# Patient Record
Sex: FEMALE | Race: WHITE | Employment: OTHER | URBAN - METROPOLITAN AREA
[De-identification: names, ages, dates, MRNs, and addresses within clinical notes are randomized per-mention and may not be internally consistent; named-entity substitution may affect disease eponyms.]

---

## 2017-01-10 ENCOUNTER — TELEPHONE (OUTPATIENT)
Dept: INFECTIOUS DISEASES | Age: 77
End: 2017-01-10

## 2017-01-10 DIAGNOSIS — A31.0 MAI (MYCOBACTERIUM AVIUM-INTRACELLULARE) INFECTION (HCC): Primary | ICD-10-CM

## 2017-01-12 DIAGNOSIS — A31.0 MAI (MYCOBACTERIUM AVIUM-INTRACELLULARE) (HCC): ICD-10-CM

## 2017-01-12 LAB
A/G RATIO: 1.9 (ref 1.1–2.2)
ALBUMIN SERPL-MCNC: 4.2 G/DL (ref 3.4–5)
ALP BLD-CCNC: 53 U/L (ref 40–129)
ALT SERPL-CCNC: 21 U/L (ref 10–40)
ANION GAP SERPL CALCULATED.3IONS-SCNC: 14 MMOL/L (ref 3–16)
AST SERPL-CCNC: 22 U/L (ref 15–37)
BASOPHILS ABSOLUTE: 0.1 K/UL (ref 0–0.2)
BASOPHILS RELATIVE PERCENT: 1.4 %
BILIRUB SERPL-MCNC: 0.3 MG/DL (ref 0–1)
BUN BLDV-MCNC: 17 MG/DL (ref 7–20)
CALCIUM SERPL-MCNC: 9.5 MG/DL (ref 8.3–10.6)
CHLORIDE BLD-SCNC: 102 MMOL/L (ref 99–110)
CO2: 27 MMOL/L (ref 21–32)
CREAT SERPL-MCNC: 0.8 MG/DL (ref 0.6–1.2)
EOSINOPHILS ABSOLUTE: 0.2 K/UL (ref 0–0.6)
EOSINOPHILS RELATIVE PERCENT: 3.4 %
GFR AFRICAN AMERICAN: >60
GFR NON-AFRICAN AMERICAN: >60
GLOBULIN: 2.2 G/DL
GLUCOSE BLD-MCNC: 146 MG/DL (ref 70–99)
HCT VFR BLD CALC: 42.3 % (ref 36–48)
HEMOGLOBIN: 13.7 G/DL (ref 12–16)
LYMPHOCYTES ABSOLUTE: 2 K/UL (ref 1–5.1)
LYMPHOCYTES RELATIVE PERCENT: 29.5 %
MCH RBC QN AUTO: 29.2 PG (ref 26–34)
MCHC RBC AUTO-ENTMCNC: 32.4 G/DL (ref 31–36)
MCV RBC AUTO: 90 FL (ref 80–100)
MONOCYTES ABSOLUTE: 0.5 K/UL (ref 0–1.3)
MONOCYTES RELATIVE PERCENT: 6.6 %
NEUTROPHILS ABSOLUTE: 4.1 K/UL (ref 1.7–7.7)
NEUTROPHILS RELATIVE PERCENT: 59.1 %
PDW BLD-RTO: 14.8 % (ref 12.4–15.4)
PLATELET # BLD: 218 K/UL (ref 135–450)
PMV BLD AUTO: 9.7 FL (ref 5–10.5)
POTASSIUM SERPL-SCNC: 4.1 MMOL/L (ref 3.5–5.1)
RBC # BLD: 4.7 M/UL (ref 4–5.2)
SODIUM BLD-SCNC: 143 MMOL/L (ref 136–145)
TOTAL PROTEIN: 6.4 G/DL (ref 6.4–8.2)
WBC # BLD: 6.9 K/UL (ref 4–11)

## 2017-01-25 RX ORDER — TIOTROPIUM BROMIDE 18 UG/1
CAPSULE ORAL; RESPIRATORY (INHALATION)
Qty: 90 CAPSULE | Refills: 1 | Status: SHIPPED | OUTPATIENT
Start: 2017-01-25 | End: 2017-07-12 | Stop reason: SDUPTHER

## 2017-02-09 ENCOUNTER — OFFICE VISIT (OUTPATIENT)
Dept: ENT CLINIC | Age: 77
End: 2017-02-09

## 2017-02-09 VITALS
HEART RATE: 74 BPM | BODY MASS INDEX: 23.57 KG/M2 | WEIGHT: 133 LBS | DIASTOLIC BLOOD PRESSURE: 74 MMHG | SYSTOLIC BLOOD PRESSURE: 130 MMHG | HEIGHT: 63 IN

## 2017-02-09 DIAGNOSIS — J34.81 NON-ULCERATIVE NASAL MUCOSITIS: ICD-10-CM

## 2017-02-09 DIAGNOSIS — R04.0 EPISTAXIS, RECURRENT: Primary | ICD-10-CM

## 2017-02-09 PROCEDURE — 99203 OFFICE O/P NEW LOW 30 MIN: CPT | Performed by: OTOLARYNGOLOGY

## 2017-02-09 RX ORDER — SODIUM CHLORIDE/ALOE VERA
SWAB, NON-MEDICATED NASAL
Qty: 20 EACH | Refills: 1 | Status: SHIPPED | OUTPATIENT
Start: 2017-02-09 | End: 2017-03-27 | Stop reason: SDUPTHER

## 2017-02-10 ENCOUNTER — OFFICE VISIT (OUTPATIENT)
Dept: INTERNAL MEDICINE CLINIC | Age: 77
End: 2017-02-10

## 2017-02-10 VITALS
SYSTOLIC BLOOD PRESSURE: 120 MMHG | HEIGHT: 63 IN | DIASTOLIC BLOOD PRESSURE: 62 MMHG | OXYGEN SATURATION: 96 % | BODY MASS INDEX: 24.1 KG/M2 | HEART RATE: 64 BPM | WEIGHT: 136 LBS

## 2017-02-10 DIAGNOSIS — J44.9 CHRONIC OBSTRUCTIVE PULMONARY DISEASE, UNSPECIFIED COPD TYPE (HCC): Primary | ICD-10-CM

## 2017-02-10 DIAGNOSIS — R00.2 HEART PALPITATIONS: ICD-10-CM

## 2017-02-10 DIAGNOSIS — E03.9 HYPOTHYROIDISM, UNSPECIFIED TYPE: ICD-10-CM

## 2017-02-10 DIAGNOSIS — J96.11 CHRONIC RESPIRATORY FAILURE WITH HYPOXIA (HCC): ICD-10-CM

## 2017-02-10 PROCEDURE — 99214 OFFICE O/P EST MOD 30 MIN: CPT | Performed by: INTERNAL MEDICINE

## 2017-02-10 PROCEDURE — 3288F FALL RISK ASSESSMENT DOCD: CPT | Performed by: INTERNAL MEDICINE

## 2017-02-10 PROCEDURE — G8510 SCR DEP NEG, NO PLAN REQD: HCPCS | Performed by: INTERNAL MEDICINE

## 2017-02-10 PROCEDURE — 36415 COLL VENOUS BLD VENIPUNCTURE: CPT | Performed by: INTERNAL MEDICINE

## 2017-02-10 ASSESSMENT — ENCOUNTER SYMPTOMS
CONSTIPATION: 1
SHORTNESS OF BREATH: 1
COUGH: 1
CHEST TIGHTNESS: 0
TROUBLE SWALLOWING: 0
WHEEZING: 0

## 2017-02-10 ASSESSMENT — PATIENT HEALTH QUESTIONNAIRE - PHQ9
1. LITTLE INTEREST OR PLEASURE IN DOING THINGS: 0
2. FEELING DOWN, DEPRESSED OR HOPELESS: 0
SUM OF ALL RESPONSES TO PHQ QUESTIONS 1-9: 0
SUM OF ALL RESPONSES TO PHQ9 QUESTIONS 1 & 2: 0

## 2017-02-11 LAB
T4 FREE: 1.2 NG/DL (ref 0.9–1.8)
TSH SERPL DL<=0.05 MIU/L-ACNC: 3.65 UIU/ML (ref 0.27–4.2)

## 2017-02-20 ENCOUNTER — OFFICE VISIT (OUTPATIENT)
Dept: PULMONOLOGY | Age: 77
End: 2017-02-20

## 2017-02-20 VITALS
SYSTOLIC BLOOD PRESSURE: 118 MMHG | TEMPERATURE: 97.7 F | BODY MASS INDEX: 24.1 KG/M2 | DIASTOLIC BLOOD PRESSURE: 60 MMHG | HEART RATE: 73 BPM | WEIGHT: 136 LBS | OXYGEN SATURATION: 91 % | RESPIRATION RATE: 12 BRPM | HEIGHT: 63 IN

## 2017-02-20 DIAGNOSIS — J96.11 CHRONIC RESPIRATORY FAILURE WITH HYPOXIA (HCC): ICD-10-CM

## 2017-02-20 DIAGNOSIS — J43.2 CENTRILOBULAR EMPHYSEMA (HCC): Primary | ICD-10-CM

## 2017-02-20 DIAGNOSIS — R04.0 EPISTAXIS: ICD-10-CM

## 2017-02-20 DIAGNOSIS — A31.0 MAI (MYCOBACTERIUM AVIUM-INTRACELLULARE) (HCC): ICD-10-CM

## 2017-02-20 PROCEDURE — 99214 OFFICE O/P EST MOD 30 MIN: CPT | Performed by: INTERNAL MEDICINE

## 2017-02-20 RX ORDER — AZITHROMYCIN 250 MG/1
250 TABLET, FILM COATED ORAL DAILY
COMMUNITY
End: 2017-05-26

## 2017-02-20 RX ORDER — ETHAMBUTOL HYDROCHLORIDE 100 MG/1
400 TABLET, FILM COATED ORAL 2 TIMES DAILY
COMMUNITY
End: 2017-08-09 | Stop reason: ALTCHOICE

## 2017-02-22 ENCOUNTER — OFFICE VISIT (OUTPATIENT)
Dept: ENT CLINIC | Age: 77
End: 2017-02-22

## 2017-02-22 VITALS
SYSTOLIC BLOOD PRESSURE: 123 MMHG | HEART RATE: 97 BPM | DIASTOLIC BLOOD PRESSURE: 66 MMHG | BODY MASS INDEX: 24.1 KG/M2 | WEIGHT: 136 LBS | HEIGHT: 63 IN

## 2017-02-22 DIAGNOSIS — R04.0 EPISTAXIS, RECURRENT: Primary | ICD-10-CM

## 2017-02-22 PROCEDURE — 99212 OFFICE O/P EST SF 10 MIN: CPT | Performed by: OTOLARYNGOLOGY

## 2017-03-01 DIAGNOSIS — J18.9 ATYPICAL PNEUMONIA: ICD-10-CM

## 2017-03-01 RX ORDER — AZITHROMYCIN 250 MG/1
TABLET, FILM COATED ORAL
Qty: 90 TABLET | Refills: 1 | Status: SHIPPED | OUTPATIENT
Start: 2017-03-01 | End: 2017-07-12 | Stop reason: SDUPTHER

## 2017-03-27 DIAGNOSIS — R04.0 EPISTAXIS, RECURRENT: ICD-10-CM

## 2017-03-27 DIAGNOSIS — J34.81 NON-ULCERATIVE NASAL MUCOSITIS: ICD-10-CM

## 2017-03-28 RX ORDER — SODIUM CHLORIDE/ALOE VERA
GEL (GRAM) NASAL
Qty: 1 TUBE | Refills: 2 | Status: SHIPPED | OUTPATIENT
Start: 2017-03-28 | End: 2021-08-26

## 2017-04-12 ENCOUNTER — OFFICE VISIT (OUTPATIENT)
Dept: INFECTIOUS DISEASES | Age: 77
End: 2017-04-12

## 2017-04-12 VITALS
BODY MASS INDEX: 27.29 KG/M2 | DIASTOLIC BLOOD PRESSURE: 64 MMHG | OXYGEN SATURATION: 91 % | HEIGHT: 63 IN | HEART RATE: 83 BPM | SYSTOLIC BLOOD PRESSURE: 124 MMHG | TEMPERATURE: 97.9 F | WEIGHT: 154 LBS

## 2017-04-12 DIAGNOSIS — A31.0 MAI (MYCOBACTERIUM AVIUM-INTRACELLULARE) (HCC): ICD-10-CM

## 2017-04-12 DIAGNOSIS — A31.0 MAI (MYCOBACTERIUM AVIUM-INTRACELLULARE) (HCC): Primary | ICD-10-CM

## 2017-04-12 LAB
A/G RATIO: 2 (ref 1.1–2.2)
ALBUMIN SERPL-MCNC: 4.4 G/DL (ref 3.4–5)
ALP BLD-CCNC: 55 U/L (ref 40–129)
ALT SERPL-CCNC: 16 U/L (ref 10–40)
ANION GAP SERPL CALCULATED.3IONS-SCNC: 16 MMOL/L (ref 3–16)
AST SERPL-CCNC: 18 U/L (ref 15–37)
BASOPHILS ABSOLUTE: 0.1 K/UL (ref 0–0.2)
BASOPHILS RELATIVE PERCENT: 1.2 %
BILIRUB SERPL-MCNC: 0.3 MG/DL (ref 0–1)
BUN BLDV-MCNC: 17 MG/DL (ref 7–20)
CALCIUM SERPL-MCNC: 10.3 MG/DL (ref 8.3–10.6)
CHLORIDE BLD-SCNC: 102 MMOL/L (ref 99–110)
CO2: 26 MMOL/L (ref 21–32)
CREAT SERPL-MCNC: 0.5 MG/DL (ref 0.6–1.2)
EOSINOPHILS ABSOLUTE: 0.3 K/UL (ref 0–0.6)
EOSINOPHILS RELATIVE PERCENT: 3.2 %
GFR AFRICAN AMERICAN: >60
GFR NON-AFRICAN AMERICAN: >60
GLOBULIN: 2.2 G/DL
GLUCOSE BLD-MCNC: 90 MG/DL (ref 70–99)
HCT VFR BLD CALC: 43.3 % (ref 36–48)
HEMOGLOBIN: 14.1 G/DL (ref 12–16)
LYMPHOCYTES ABSOLUTE: 2.4 K/UL (ref 1–5.1)
LYMPHOCYTES RELATIVE PERCENT: 26.5 %
MCH RBC QN AUTO: 30 PG (ref 26–34)
MCHC RBC AUTO-ENTMCNC: 32.5 G/DL (ref 31–36)
MCV RBC AUTO: 92.3 FL (ref 80–100)
MONOCYTES ABSOLUTE: 1 K/UL (ref 0–1.3)
MONOCYTES RELATIVE PERCENT: 11 %
NEUTROPHILS ABSOLUTE: 5.2 K/UL (ref 1.7–7.7)
NEUTROPHILS RELATIVE PERCENT: 58.1 %
PDW BLD-RTO: 15.5 % (ref 12.4–15.4)
PLATELET # BLD: 272 K/UL (ref 135–450)
PMV BLD AUTO: 9.6 FL (ref 5–10.5)
POTASSIUM SERPL-SCNC: 4.6 MMOL/L (ref 3.5–5.1)
RBC # BLD: 4.69 M/UL (ref 4–5.2)
SODIUM BLD-SCNC: 144 MMOL/L (ref 136–145)
TOTAL PROTEIN: 6.6 G/DL (ref 6.4–8.2)
WBC # BLD: 9 K/UL (ref 4–11)

## 2017-04-12 PROCEDURE — 99214 OFFICE O/P EST MOD 30 MIN: CPT | Performed by: INTERNAL MEDICINE

## 2017-04-18 ENCOUNTER — TELEPHONE (OUTPATIENT)
Dept: INFECTIOUS DISEASES | Age: 77
End: 2017-04-18

## 2017-04-20 ENCOUNTER — HOSPITAL ENCOUNTER (OUTPATIENT)
Dept: CT IMAGING | Age: 77
Discharge: HOME | End: 2017-04-20
Attending: INTERNAL MEDICINE | Admitting: INTERNAL MEDICINE

## 2017-04-20 DIAGNOSIS — A31.0 PULMONARY MYCOBACTERIAL INFECTION (HCC): ICD-10-CM

## 2017-04-20 DIAGNOSIS — A31.0 MAI (MYCOBACTERIUM AVIUM-INTRACELLULARE) (HCC): ICD-10-CM

## 2017-04-24 ENCOUNTER — TELEPHONE (OUTPATIENT)
Dept: PULMONOLOGY | Age: 77
End: 2017-04-24

## 2017-05-24 RX ORDER — LEVOTHYROXINE SODIUM 0.1 MG/1
TABLET ORAL
Qty: 90 TABLET | Refills: 1 | Status: SHIPPED | OUTPATIENT
Start: 2017-05-24 | End: 2017-11-29 | Stop reason: SDUPTHER

## 2017-05-26 ENCOUNTER — OFFICE VISIT (OUTPATIENT)
Dept: INTERNAL MEDICINE CLINIC | Age: 77
End: 2017-05-26

## 2017-05-26 VITALS
OXYGEN SATURATION: 94 % | DIASTOLIC BLOOD PRESSURE: 68 MMHG | RESPIRATION RATE: 14 BRPM | BODY MASS INDEX: 23.91 KG/M2 | WEIGHT: 135 LBS | HEART RATE: 86 BPM | SYSTOLIC BLOOD PRESSURE: 130 MMHG

## 2017-05-26 DIAGNOSIS — K21.9 GASTROESOPHAGEAL REFLUX DISEASE WITHOUT ESOPHAGITIS: ICD-10-CM

## 2017-05-26 DIAGNOSIS — E03.9 HYPOTHYROIDISM, UNSPECIFIED TYPE: ICD-10-CM

## 2017-05-26 DIAGNOSIS — J96.11 CHRONIC RESPIRATORY FAILURE WITH HYPOXIA (HCC): ICD-10-CM

## 2017-05-26 DIAGNOSIS — J44.9 CHRONIC OBSTRUCTIVE PULMONARY DISEASE, UNSPECIFIED COPD TYPE (HCC): Primary | ICD-10-CM

## 2017-05-26 PROCEDURE — 99213 OFFICE O/P EST LOW 20 MIN: CPT | Performed by: INTERNAL MEDICINE

## 2017-05-26 ASSESSMENT — ENCOUNTER SYMPTOMS
GASTROINTESTINAL NEGATIVE: 1
CHEST TIGHTNESS: 0
WHEEZING: 0
SHORTNESS OF BREATH: 0
TROUBLE SWALLOWING: 0
SORE THROAT: 0

## 2017-06-27 ENCOUNTER — CARE COORDINATION (OUTPATIENT)
Dept: CARE COORDINATION | Age: 77
End: 2017-06-27

## 2017-06-28 RX ORDER — MONTELUKAST SODIUM 10 MG/1
TABLET ORAL
Qty: 90 TABLET | Refills: 3 | Status: SHIPPED | OUTPATIENT
Start: 2017-06-28 | End: 2018-01-15 | Stop reason: SDUPTHER

## 2017-07-05 RX ORDER — ATORVASTATIN CALCIUM 20 MG/1
TABLET, FILM COATED ORAL
Qty: 90 TABLET | Refills: 1 | Status: SHIPPED | OUTPATIENT
Start: 2017-07-05 | End: 2018-01-15 | Stop reason: SDUPTHER

## 2017-07-12 ENCOUNTER — OFFICE VISIT (OUTPATIENT)
Dept: INFECTIOUS DISEASES | Age: 77
End: 2017-07-12

## 2017-07-12 VITALS
HEART RATE: 90 BPM | SYSTOLIC BLOOD PRESSURE: 110 MMHG | WEIGHT: 135 LBS | TEMPERATURE: 98.1 F | HEIGHT: 63 IN | BODY MASS INDEX: 23.92 KG/M2 | OXYGEN SATURATION: 93 % | DIASTOLIC BLOOD PRESSURE: 64 MMHG

## 2017-07-12 DIAGNOSIS — A31.0 MAI (MYCOBACTERIUM AVIUM-INTRACELLULARE) (HCC): Primary | ICD-10-CM

## 2017-07-12 DIAGNOSIS — A31.0 MAI (MYCOBACTERIUM AVIUM-INTRACELLULARE) (HCC): ICD-10-CM

## 2017-07-12 DIAGNOSIS — J18.9 ATYPICAL PNEUMONIA: ICD-10-CM

## 2017-07-12 LAB
A/G RATIO: 2.1 (ref 1.1–2.2)
ALBUMIN SERPL-MCNC: 4.4 G/DL (ref 3.4–5)
ALP BLD-CCNC: 56 U/L (ref 40–129)
ALT SERPL-CCNC: 18 U/L (ref 10–40)
ANION GAP SERPL CALCULATED.3IONS-SCNC: 15 MMOL/L (ref 3–16)
AST SERPL-CCNC: 21 U/L (ref 15–37)
BASOPHILS ABSOLUTE: 0.1 K/UL (ref 0–0.2)
BASOPHILS RELATIVE PERCENT: 0.8 %
BILIRUB SERPL-MCNC: 0.4 MG/DL (ref 0–1)
BUN BLDV-MCNC: 15 MG/DL (ref 7–20)
CALCIUM SERPL-MCNC: 10.3 MG/DL (ref 8.3–10.6)
CHLORIDE BLD-SCNC: 101 MMOL/L (ref 99–110)
CO2: 27 MMOL/L (ref 21–32)
CREAT SERPL-MCNC: 0.7 MG/DL (ref 0.6–1.2)
EOSINOPHILS ABSOLUTE: 0.3 K/UL (ref 0–0.6)
EOSINOPHILS RELATIVE PERCENT: 4.2 %
GFR AFRICAN AMERICAN: >60
GFR NON-AFRICAN AMERICAN: >60
GLOBULIN: 2.1 G/DL
GLUCOSE BLD-MCNC: 111 MG/DL (ref 70–99)
HCT VFR BLD CALC: 42.3 % (ref 36–48)
HEMOGLOBIN: 14.2 G/DL (ref 12–16)
LYMPHOCYTES ABSOLUTE: 1.7 K/UL (ref 1–5.1)
LYMPHOCYTES RELATIVE PERCENT: 21.8 %
MCH RBC QN AUTO: 30.4 PG (ref 26–34)
MCHC RBC AUTO-ENTMCNC: 33.5 G/DL (ref 31–36)
MCV RBC AUTO: 90.9 FL (ref 80–100)
MONOCYTES ABSOLUTE: 0.5 K/UL (ref 0–1.3)
MONOCYTES RELATIVE PERCENT: 7.2 %
NEUTROPHILS ABSOLUTE: 5 K/UL (ref 1.7–7.7)
NEUTROPHILS RELATIVE PERCENT: 66 %
PDW BLD-RTO: 15.2 % (ref 12.4–15.4)
PLATELET # BLD: 221 K/UL (ref 135–450)
PMV BLD AUTO: 9.7 FL (ref 5–10.5)
POTASSIUM SERPL-SCNC: 4.3 MMOL/L (ref 3.5–5.1)
RBC # BLD: 4.66 M/UL (ref 4–5.2)
SODIUM BLD-SCNC: 143 MMOL/L (ref 136–145)
TOTAL PROTEIN: 6.5 G/DL (ref 6.4–8.2)
WBC # BLD: 7.6 K/UL (ref 4–11)

## 2017-07-12 PROCEDURE — 99213 OFFICE O/P EST LOW 20 MIN: CPT | Performed by: INTERNAL MEDICINE

## 2017-07-12 RX ORDER — TIOTROPIUM BROMIDE 18 UG/1
CAPSULE ORAL; RESPIRATORY (INHALATION)
Qty: 90 CAPSULE | Refills: 1 | Status: SHIPPED | OUTPATIENT
Start: 2017-07-12 | End: 2018-01-09 | Stop reason: SDUPTHER

## 2017-07-12 RX ORDER — ETHAMBUTOL HYDROCHLORIDE 400 MG/1
800 TABLET, FILM COATED ORAL DAILY
Qty: 180 TABLET | Refills: 1 | Status: SHIPPED | OUTPATIENT
Start: 2017-07-12 | End: 2017-10-10

## 2017-07-12 RX ORDER — AZITHROMYCIN 250 MG/1
TABLET, FILM COATED ORAL
Qty: 90 TABLET | Refills: 1 | Status: SHIPPED | OUTPATIENT
Start: 2017-07-12 | End: 2017-12-08 | Stop reason: SDUPTHER

## 2017-07-12 RX ORDER — ETHAMBUTOL HYDROCHLORIDE 400 MG/1
800 TABLET, FILM COATED ORAL DAILY
Qty: 180 TABLET | Refills: 1 | Status: SHIPPED | OUTPATIENT
Start: 2017-07-12 | End: 2017-07-12 | Stop reason: SDUPTHER

## 2017-08-09 ENCOUNTER — OFFICE VISIT (OUTPATIENT)
Dept: PULMONOLOGY | Age: 77
End: 2017-08-09

## 2017-08-09 ENCOUNTER — TELEPHONE (OUTPATIENT)
Dept: INTERNAL MEDICINE CLINIC | Age: 77
End: 2017-08-09

## 2017-08-09 VITALS
HEART RATE: 89 BPM | BODY MASS INDEX: 24.17 KG/M2 | RESPIRATION RATE: 16 BRPM | DIASTOLIC BLOOD PRESSURE: 60 MMHG | TEMPERATURE: 97.6 F | OXYGEN SATURATION: 96 % | HEIGHT: 63 IN | WEIGHT: 136.4 LBS | SYSTOLIC BLOOD PRESSURE: 100 MMHG

## 2017-08-09 DIAGNOSIS — R91.8 LUNG NODULES: ICD-10-CM

## 2017-08-09 DIAGNOSIS — J43.2 CENTRILOBULAR EMPHYSEMA (HCC): Primary | ICD-10-CM

## 2017-08-09 DIAGNOSIS — J96.11 CHRONIC RESPIRATORY FAILURE WITH HYPOXIA (HCC): ICD-10-CM

## 2017-08-09 PROCEDURE — 99214 OFFICE O/P EST MOD 30 MIN: CPT | Performed by: INTERNAL MEDICINE

## 2017-08-09 RX ORDER — ALBUTEROL SULFATE 2.5 MG/3ML
2.5 SOLUTION RESPIRATORY (INHALATION) 2 TIMES DAILY
Qty: 180 ML | Refills: 11 | Status: SHIPPED | OUTPATIENT
Start: 2017-08-09

## 2017-09-01 ENCOUNTER — OFFICE VISIT (OUTPATIENT)
Dept: INTERNAL MEDICINE CLINIC | Age: 77
End: 2017-09-01

## 2017-09-01 VITALS
HEART RATE: 83 BPM | WEIGHT: 136 LBS | BODY MASS INDEX: 24.1 KG/M2 | HEIGHT: 63 IN | RESPIRATION RATE: 16 BRPM | SYSTOLIC BLOOD PRESSURE: 130 MMHG | OXYGEN SATURATION: 91 % | DIASTOLIC BLOOD PRESSURE: 70 MMHG

## 2017-09-01 DIAGNOSIS — J44.9 CHRONIC OBSTRUCTIVE PULMONARY DISEASE, UNSPECIFIED COPD TYPE (HCC): Primary | ICD-10-CM

## 2017-09-01 DIAGNOSIS — J96.11 CHRONIC RESPIRATORY FAILURE WITH HYPOXIA (HCC): ICD-10-CM

## 2017-09-01 PROCEDURE — 99213 OFFICE O/P EST LOW 20 MIN: CPT | Performed by: INTERNAL MEDICINE

## 2017-09-01 ASSESSMENT — ENCOUNTER SYMPTOMS
GASTROINTESTINAL NEGATIVE: 1
WHEEZING: 0
SHORTNESS OF BREATH: 1
CHEST TIGHTNESS: 0
SORE THROAT: 0
COUGH: 0
TROUBLE SWALLOWING: 0

## 2017-09-07 RX ORDER — OMEPRAZOLE 40 MG/1
CAPSULE, DELAYED RELEASE ORAL
Qty: 90 CAPSULE | Refills: 1 | Status: SHIPPED | OUTPATIENT
Start: 2017-09-07 | End: 2018-01-15 | Stop reason: SDUPTHER

## 2017-09-14 DIAGNOSIS — A31.0 MAI (MYCOBACTERIUM AVIUM-INTRACELLULARE) (HCC): ICD-10-CM

## 2017-09-14 LAB
A/G RATIO: 1.7 (ref 1.1–2.2)
ALBUMIN SERPL-MCNC: 4.1 G/DL (ref 3.4–5)
ALP BLD-CCNC: 52 U/L (ref 40–129)
ALT SERPL-CCNC: 19 U/L (ref 10–40)
ANION GAP SERPL CALCULATED.3IONS-SCNC: 13 MMOL/L (ref 3–16)
AST SERPL-CCNC: 19 U/L (ref 15–37)
BASOPHILS ABSOLUTE: 0.1 K/UL (ref 0–0.2)
BASOPHILS RELATIVE PERCENT: 1 %
BILIRUB SERPL-MCNC: 0.3 MG/DL (ref 0–1)
BUN BLDV-MCNC: 15 MG/DL (ref 7–20)
CALCIUM SERPL-MCNC: 9.9 MG/DL (ref 8.3–10.6)
CHLORIDE BLD-SCNC: 104 MMOL/L (ref 99–110)
CO2: 26 MMOL/L (ref 21–32)
CREAT SERPL-MCNC: 0.7 MG/DL (ref 0.6–1.2)
EOSINOPHILS ABSOLUTE: 0.2 K/UL (ref 0–0.6)
EOSINOPHILS RELATIVE PERCENT: 2.1 %
GFR AFRICAN AMERICAN: >60
GFR NON-AFRICAN AMERICAN: >60
GLOBULIN: 2.4 G/DL
GLUCOSE BLD-MCNC: 105 MG/DL (ref 70–99)
HCT VFR BLD CALC: 44.1 % (ref 36–48)
HEMOGLOBIN: 14.6 G/DL (ref 12–16)
LYMPHOCYTES ABSOLUTE: 1.9 K/UL (ref 1–5.1)
LYMPHOCYTES RELATIVE PERCENT: 22 %
MCH RBC QN AUTO: 30.4 PG (ref 26–34)
MCHC RBC AUTO-ENTMCNC: 33.2 G/DL (ref 31–36)
MCV RBC AUTO: 91.6 FL (ref 80–100)
MONOCYTES ABSOLUTE: 0.7 K/UL (ref 0–1.3)
MONOCYTES RELATIVE PERCENT: 7.8 %
NEUTROPHILS ABSOLUTE: 5.8 K/UL (ref 1.7–7.7)
NEUTROPHILS RELATIVE PERCENT: 67.1 %
PDW BLD-RTO: 15.6 % (ref 12.4–15.4)
PLATELET # BLD: 222 K/UL (ref 135–450)
PMV BLD AUTO: 9.8 FL (ref 5–10.5)
POTASSIUM SERPL-SCNC: 4.3 MMOL/L (ref 3.5–5.1)
RBC # BLD: 4.81 M/UL (ref 4–5.2)
SODIUM BLD-SCNC: 143 MMOL/L (ref 136–145)
TOTAL PROTEIN: 6.5 G/DL (ref 6.4–8.2)
WBC # BLD: 8.6 K/UL (ref 4–11)

## 2017-09-27 DIAGNOSIS — J18.9 ATYPICAL PNEUMONIA: ICD-10-CM

## 2017-09-27 RX ORDER — AZITHROMYCIN 250 MG/1
TABLET, FILM COATED ORAL
Qty: 90 TABLET | Refills: 1 | Status: SHIPPED | OUTPATIENT
Start: 2017-09-27 | End: 2018-02-26 | Stop reason: ALTCHOICE

## 2017-10-04 ENCOUNTER — TELEPHONE (OUTPATIENT)
Dept: INFECTIOUS DISEASES | Age: 77
End: 2017-10-04

## 2017-10-04 NOTE — TELEPHONE ENCOUNTER
Pt called needs PA for azithromycin, please call [phone] to facilitate. Pt also needs refills on Rifampin and Myambutol.

## 2017-11-29 RX ORDER — LEVOTHYROXINE SODIUM 0.1 MG/1
TABLET ORAL
Qty: 90 TABLET | Refills: 1 | Status: SHIPPED | OUTPATIENT
Start: 2017-11-29 | End: 2018-01-15 | Stop reason: SDUPTHER

## 2017-12-08 ENCOUNTER — OFFICE VISIT (OUTPATIENT)
Dept: INTERNAL MEDICINE CLINIC | Age: 77
End: 2017-12-08

## 2017-12-08 VITALS
HEART RATE: 78 BPM | OXYGEN SATURATION: 93 % | HEIGHT: 63 IN | WEIGHT: 136 LBS | BODY MASS INDEX: 24.1 KG/M2 | DIASTOLIC BLOOD PRESSURE: 66 MMHG | RESPIRATION RATE: 16 BRPM | SYSTOLIC BLOOD PRESSURE: 132 MMHG

## 2017-12-08 DIAGNOSIS — E03.9 HYPOTHYROIDISM, UNSPECIFIED TYPE: ICD-10-CM

## 2017-12-08 DIAGNOSIS — K21.9 GASTROESOPHAGEAL REFLUX DISEASE WITHOUT ESOPHAGITIS: ICD-10-CM

## 2017-12-08 DIAGNOSIS — J96.11 CHRONIC RESPIRATORY FAILURE WITH HYPOXIA (HCC): ICD-10-CM

## 2017-12-08 DIAGNOSIS — J44.9 CHRONIC OBSTRUCTIVE PULMONARY DISEASE, UNSPECIFIED COPD TYPE (HCC): Primary | ICD-10-CM

## 2017-12-08 DIAGNOSIS — E78.00 PURE HYPERCHOLESTEROLEMIA: ICD-10-CM

## 2017-12-08 LAB
ALBUMIN SERPL-MCNC: 4.6 G/DL (ref 3.4–5)
ALP BLD-CCNC: 51 U/L (ref 40–129)
ALT SERPL-CCNC: 15 U/L (ref 10–40)
AST SERPL-CCNC: 19 U/L (ref 15–37)
BILIRUB SERPL-MCNC: 0.5 MG/DL (ref 0–1)
BILIRUBIN DIRECT: <0.2 MG/DL (ref 0–0.3)
BILIRUBIN, INDIRECT: NORMAL MG/DL (ref 0–1)
CHOLESTEROL, TOTAL: 139 MG/DL (ref 0–199)
HDLC SERPL-MCNC: 71 MG/DL (ref 40–60)
LDL CHOLESTEROL CALCULATED: 33 MG/DL
T4 FREE: 1.3 NG/DL (ref 0.9–1.8)
TOTAL PROTEIN: 6.9 G/DL (ref 6.4–8.2)
TRIGL SERPL-MCNC: 175 MG/DL (ref 0–150)
TSH SERPL DL<=0.05 MIU/L-ACNC: 3.21 UIU/ML (ref 0.27–4.2)
VLDLC SERPL CALC-MCNC: 35 MG/DL

## 2017-12-08 PROCEDURE — 99213 OFFICE O/P EST LOW 20 MIN: CPT | Performed by: INTERNAL MEDICINE

## 2017-12-08 PROCEDURE — 36415 COLL VENOUS BLD VENIPUNCTURE: CPT | Performed by: INTERNAL MEDICINE

## 2017-12-08 ASSESSMENT — ENCOUNTER SYMPTOMS
SHORTNESS OF BREATH: 0
GASTROINTESTINAL NEGATIVE: 1
COUGH: 0
CHEST TIGHTNESS: 0
WHEEZING: 0
TROUBLE SWALLOWING: 0

## 2017-12-13 ENCOUNTER — OFFICE VISIT (OUTPATIENT)
Dept: INFECTIOUS DISEASES | Age: 77
End: 2017-12-13

## 2017-12-13 ENCOUNTER — HOSPITAL ENCOUNTER (OUTPATIENT)
Dept: OTHER | Age: 77
Discharge: HOME | End: 2017-12-13
Attending: INTERNAL MEDICINE | Admitting: INTERNAL MEDICINE

## 2017-12-13 VITALS
BODY MASS INDEX: 24.27 KG/M2 | DIASTOLIC BLOOD PRESSURE: 60 MMHG | SYSTOLIC BLOOD PRESSURE: 132 MMHG | TEMPERATURE: 97.4 F | OXYGEN SATURATION: 88 % | WEIGHT: 137 LBS | HEIGHT: 63 IN | HEART RATE: 84 BPM

## 2017-12-13 DIAGNOSIS — J96.11 CHRONIC RESPIRATORY FAILURE WITH HYPOXIA (HCC): ICD-10-CM

## 2017-12-13 DIAGNOSIS — A31.0 MAI (MYCOBACTERIUM AVIUM-INTRACELLULARE) (HCC): ICD-10-CM

## 2017-12-13 DIAGNOSIS — A31.0 MAI (MYCOBACTERIUM AVIUM-INTRACELLULARE) (HCC): Primary | ICD-10-CM

## 2017-12-13 PROCEDURE — 99213 OFFICE O/P EST LOW 20 MIN: CPT | Performed by: INTERNAL MEDICINE

## 2017-12-15 ENCOUNTER — TELEPHONE (OUTPATIENT)
Dept: INFECTIOUS DISEASES | Age: 77
End: 2017-12-15

## 2017-12-15 NOTE — TELEPHONE ENCOUNTER
Pt called and said she was seen by Dr on 12/13/17 in clinic. She isn't feeling any better and has cough and shortness of breath and wanted to inform Dr Magui Gordon of her symptoms and wasn't feeling better. Please advise.

## 2017-12-16 RX ORDER — PREDNISONE 20 MG/1
40 TABLET ORAL DAILY
Qty: 10 TABLET | Refills: 0 | Status: SHIPPED | OUTPATIENT
Start: 2017-12-16 | End: 2017-12-21

## 2017-12-18 NOTE — TELEPHONE ENCOUNTER
Called and spoke with patient. Pt states she still has cough but the shortness of breath is better and overall she feels better.

## 2017-12-22 NOTE — PROGRESS NOTES
Infectious Diseases Outpatient Follow-up Note      Primary Care Physician:  Ijeoma Carr MD       History Obtained From:   Patient, EPIC    CHIEF COMPLAINT / ID problem:    Chief Complaint   Patient presents with    Follow-up     HEAVENLY (mycobacterium avium-intracellulare) ([facility] [address]       HISTORY OF PRESENT ILLNESS / Interval history:  Follow up on HEAVENLY Lung infection h/o COPD had URI symptoms x 2 days feels tired no fevers cough dry+ tolerating the med swell and use O2 pRN, recent LFTs from PCP visit normal has seen Jeremy Pike in follow up. She is on Azithromycin and  Ethambutol + Rifampin. Past Medical History:    Past Medical History:   Diagnosis Date    COPD (chronic obstructive pulmonary disease) (HCC)     GERD (gastroesophageal reflux disease)     High cholesterol        Past Surgical History:    Past Surgical History:   Procedure Laterality Date     SECTION      4 times    COLONOSCOPY  . redundant colon       Current Medications:    Current Outpatient Prescriptions   Medication Sig Dispense Refill    levothyroxine (SYNTHROID) 100 MCG tablet TAKE 1 TABLET DAILY 90 tablet 1    azithromycin (ZITHROMAX) 250 MG tablet TAKE 1 TABLET DAILY 90 tablet 1    omeprazole (PRILOSEC) 40 MG delayed release capsule TAKE 1 CAPSULE DAILY 90 capsule 1    fluticasone-salmeterol (ADVAIR DISKUS) 500-50 MCG/DOSE diskus inhaler USE 1 INHALATION ORALLY    TWICE DAILY 180 each 1    albuterol (PROVENTIL) (2.5 MG/3ML) 0.083% nebulizer solution Take 3 mLs by nebulization 2 times daily 180 mL 11    SPIRIVA HANDIHALER 18 MCG inhalation capsule INHALE THE CONTENTS OF ONE CAPSULE DAILY 90 capsule 1    atorvastatin (LIPITOR) 20 MG tablet TAKE 1 TABLET DAILY 90 tablet 1    montelukast (SINGULAIR) 10 MG tablet TAKE 1 TABLET NIGHTLY 90 tablet 3    saline nasal gel (AYR) GEL APPLY INSIDE BOTH NOSTRILS TWO TIMES A DAY 1 Tube 2    Misc.  Devices (ACAPELLA) MISC 1 Device by Does not apply route daily 1 each 0    ipratropium-albuterol (DUONEB) 0.5-2.5 (3) MG/3ML SOLN nebulizer solution Take 3 mLs by nebulization every 6 hours as needed for Shortness of Breath 360 mL 6    PROAIR  (90 BASE) MCG/ACT inhaler INHALE TWO PUFFS BY MOUTH EVERY 4 HOURS AS NEEDED FOR WHEEZING 8.5 g 2    sodium chloride 3 % nebulizer solution Take 15 mLs by nebulization as needed for Other or Cough 300 mL 1    predniSONE (DELTASONE) 20 MG tablet Take 2 tablets by mouth daily for 5 days 10 tablet 0    rifampin (RIFADIN) 300 MG capsule Take 1 capsule by mouth 2 times daily 180 capsule 1     No current facility-administered medications for this visit. Allergies:  Sulfa antibiotics    Social History:    Social History     Social History    Marital status:      Spouse name: N/A    Number of children: N/A    Years of education: N/A     Social History Main Topics    Smoking status: Former Smoker     Packs/day: 1.50     Years: 50.00     Types: Cigarettes     Quit date: 9/6/1999    Smokeless tobacco: Never Used      Comment: quit 10 years ago    Alcohol use No    Drug use: No    Sexual activity: Not Asked     Other Topics Concern    None     Social History Narrative    None     Family History   Problem Relation Age of Onset    Diabetes Sister      x's 1    Other Sister      cad    High Blood Pressure Mother     Diabetes Mother     Heart Attack Mother     Other Mother      hypothyroidism    High Blood Pressure Father     Heart Attack Father     Heart Attack Maternal Aunt          REVIEW OF SYSTEMS:    No fever / chills / sweats. No weight loss. No visual change, eye pain, eye discharge. No oral lesion, sore throat, dysphagia. Denies cough / sputum. Denies chest pain, palpitations. Denies n / v / abd pain. No diarrhea. Denies dysuria or change in urinary function. Denies joint swelling or pain. No myalgia, arthralgia.   Denies focal weakness, sensory change or other neurologic symptom  No lymph node swelling

## 2018-01-09 ENCOUNTER — TELEPHONE (OUTPATIENT)
Dept: INTERNAL MEDICINE CLINIC | Age: 78
End: 2018-01-09

## 2018-01-15 RX ORDER — ATORVASTATIN CALCIUM 20 MG/1
TABLET, FILM COATED ORAL
Qty: 90 TABLET | Refills: 1 | Status: SHIPPED | OUTPATIENT
Start: 2018-01-15 | End: 2019-05-01 | Stop reason: SDUPTHER

## 2018-01-15 RX ORDER — OMEPRAZOLE 40 MG/1
CAPSULE, DELAYED RELEASE ORAL
Qty: 90 CAPSULE | Refills: 1 | Status: SHIPPED | OUTPATIENT
Start: 2018-01-15 | End: 2018-09-20 | Stop reason: SDUPTHER

## 2018-01-15 RX ORDER — MONTELUKAST SODIUM 10 MG/1
TABLET ORAL
Qty: 90 TABLET | Refills: 1 | Status: SHIPPED | OUTPATIENT
Start: 2018-01-15 | End: 2018-08-15 | Stop reason: SDUPTHER

## 2018-01-15 RX ORDER — LEVOTHYROXINE SODIUM 0.1 MG/1
TABLET ORAL
Qty: 90 TABLET | Refills: 1 | Status: SHIPPED | OUTPATIENT
Start: 2018-01-15 | End: 2018-07-30 | Stop reason: SDUPTHER

## 2018-02-16 ENCOUNTER — TELEPHONE (OUTPATIENT)
Dept: INTERNAL MEDICINE CLINIC | Age: 78
End: 2018-02-16

## 2018-02-26 ENCOUNTER — OFFICE VISIT (OUTPATIENT)
Dept: PULMONOLOGY | Age: 78
End: 2018-02-26

## 2018-02-26 VITALS
WEIGHT: 132 LBS | BODY MASS INDEX: 23.39 KG/M2 | RESPIRATION RATE: 16 BRPM | SYSTOLIC BLOOD PRESSURE: 110 MMHG | HEART RATE: 85 BPM | TEMPERATURE: 97.5 F | OXYGEN SATURATION: 90 % | HEIGHT: 63 IN | DIASTOLIC BLOOD PRESSURE: 64 MMHG

## 2018-02-26 DIAGNOSIS — R91.8 LUNG NODULES: ICD-10-CM

## 2018-02-26 DIAGNOSIS — J43.2 CENTRILOBULAR EMPHYSEMA (HCC): Primary | ICD-10-CM

## 2018-02-26 DIAGNOSIS — J96.11 CHRONIC RESPIRATORY FAILURE WITH HYPOXIA (HCC): ICD-10-CM

## 2018-02-26 PROCEDURE — 99214 OFFICE O/P EST MOD 30 MIN: CPT | Performed by: INTERNAL MEDICINE

## 2018-02-26 NOTE — PROGRESS NOTES
albuterol (PROVENTIL) (2.5 MG/3ML) 0.083% nebulizer solution Take 3 mLs by nebulization 2 times daily 180 mL 11    saline nasal gel (AYR) GEL APPLY INSIDE BOTH NOSTRILS TWO TIMES A DAY 1 Tube 2    Misc. Devices (ACAPELLA) MISC 1 Device by Does not apply route daily 1 each 0    PROAIR  (90 BASE) MCG/ACT inhaler INHALE TWO PUFFS BY MOUTH EVERY 4 HOURS AS NEEDED FOR WHEEZING 8.5 g 2    sodium chloride 3 % nebulizer solution Take 15 mLs by nebulization as needed for Other or Cough 300 mL 1    rifampin (RIFADIN) 300 MG capsule Take 1 capsule by mouth 2 times daily 180 capsule 1     No current facility-administered medications for this visit. Allergies   Allergen Reactions    Sulfa Antibiotics        Family History   Problem Relation Age of Onset    Diabetes Sister      x's 3    Other Sister      cad    High Blood Pressure Mother     Diabetes Mother     Heart Attack Mother     Other Mother      hypothyroidism    High Blood Pressure Father     Heart Attack Father     Heart Attack Maternal Aunt        Social History     Social History    Marital status:      Spouse name: N/A    Number of children: N/A    Years of education: N/A     Occupational History    Not on file.      Social History Main Topics    Smoking status: Former Smoker     Packs/day: 1.50     Years: 50.00     Types: Cigarettes     Quit date: 9/6/1999    Smokeless tobacco: Never Used      Comment: quit 10 years ago    Alcohol use No    Drug use: No    Sexual activity: Not on file     Other Topics Concern    Not on file     Social History Narrative    No narrative on file       Immunization History   Administered Date(s) Administered    Influenza Nasal 11/01/2014    Influenza Vaccine, unspecified formulation 11/15/2016    Influenza Virus Vaccine 11/01/2014    Influenza Whole 10/01/2008    Influenza, High Dose 10/12/2015, 11/15/2016    Pneumococcal 13-valent Conjugate ([id number]) 04/15/2016    Pneumococcal Conjugate 7-valent 11/01/2005, 10/11/2010    Pneumococcal Polysaccharide ([id number]) 02/26/2017       ROS:  GENERAL:  No fevers, no chills  RESPIRATORY:  + exertional shortness of breath, + cough      PHYSICAL EXAM:  Vitals:    02/26/18 0755   BP: 110/64   Pulse: 85   Resp: 16   Temp: 97.5 °F (36.4 °C)   SpO2: 90%   on 2L NC    Gen: Well developed; well nourished  Eyes: No scleral icterus. No conjunctival injection. ENT:  Oropharynx clear. External appearance of ears and nose normal.  Neck: Trachea midline. No obvious mass. No visible thyroid enlargement    Respiratory: Clear to auscultation bilaterally, no accessory muscle use  Cardiovascular: Regular rate and rhythm, no appreciable murmurs. No edema. Gastrointestinal: Soft, non-tender. No hernia  Skin: Warm and dry. No rashes or ulcers on visible areas (LLL wrapped in bandage). Normal texture and turgor  Lymphatic: No cervical LAD. No supraclavicular LAD. Musculoskeletal: No cyanosis, clubbing or joint deformity. Psychiatric: Normal mood and affect; exhibits normal insight and judgement     Pulmonary Function Testing (6/20/16)  FVC 2.87 L at 108% predicted ---> 2.92L at 110% predicted  FEV1 131 L at 66% predicted ----> 1.36L at 69% predicted  FEV1/FVC ratio at 46% ----> 47% predicted  TLC 4.55 L at 92% predicted  VC 2.87L at 109% predicted  ERV 1.27L at 146% predicted  RV/TLC at 37% predicted  DLCO 4.3 at 22% predicted  DLCO/VA 1.33L at 38 % predicted      Overall: Moderate lower airflow obstruction without significant reversal; normal lung volumes; severe reduction in diffusing capacity that does not normalize when averaged over lung volumes (compared to the study in 2013 diffusing capacity has declined, otherwise there has been no significant change)          Pulmonary Function Testing (9/16/13)  FVC 2.69 L at 97% predicted    FEV1 1.23 L at 59% predicted  FEV1/FVC ratio at 44%  TLC 4.4 L at 94% predicted      Overall:  Moderately severe obstruction,

## 2018-03-03 ENCOUNTER — TELEPHONE (OUTPATIENT)
Dept: INTERNAL MEDICINE CLINIC | Age: 78
End: 2018-03-03

## 2018-03-05 ENCOUNTER — TELEPHONE (OUTPATIENT)
Dept: PULMONOLOGY | Age: 78
End: 2018-03-05

## 2018-03-05 DIAGNOSIS — J44.9 CHRONIC OBSTRUCTIVE PULMONARY DISEASE, UNSPECIFIED COPD TYPE (HCC): ICD-10-CM

## 2018-03-05 DIAGNOSIS — J96.11 CHRONIC RESPIRATORY FAILURE WITH HYPOXIA (HCC): Primary | ICD-10-CM

## 2018-03-05 NOTE — TELEPHONE ENCOUNTER
Pt called requesting Rx for a POC to be sent to [facility]  Their phone is [phone]  Fax- [phone]  Matheus Campos to send orders

## 2018-03-16 ENCOUNTER — TELEPHONE (OUTPATIENT)
Dept: INTERNAL MEDICINE CLINIC | Age: 78
End: 2018-03-16

## 2018-03-16 ENCOUNTER — OFFICE VISIT (OUTPATIENT)
Dept: INTERNAL MEDICINE CLINIC | Age: 78
End: 2018-03-16

## 2018-03-16 VITALS
SYSTOLIC BLOOD PRESSURE: 118 MMHG | BODY MASS INDEX: 23.57 KG/M2 | WEIGHT: 133 LBS | HEART RATE: 84 BPM | DIASTOLIC BLOOD PRESSURE: 74 MMHG | RESPIRATION RATE: 18 BRPM | HEIGHT: 63 IN | OXYGEN SATURATION: 95 %

## 2018-03-16 DIAGNOSIS — J44.9 CHRONIC OBSTRUCTIVE PULMONARY DISEASE, UNSPECIFIED COPD TYPE (HCC): Primary | ICD-10-CM

## 2018-03-16 DIAGNOSIS — J96.11 CHRONIC RESPIRATORY FAILURE WITH HYPOXIA (HCC): ICD-10-CM

## 2018-03-16 DIAGNOSIS — K21.9 GASTROESOPHAGEAL REFLUX DISEASE WITHOUT ESOPHAGITIS: ICD-10-CM

## 2018-03-16 PROCEDURE — 99213 OFFICE O/P EST LOW 20 MIN: CPT | Performed by: INTERNAL MEDICINE

## 2018-03-16 RX ORDER — PREDNISONE 10 MG/1
TABLET ORAL
Qty: 35 TABLET | Refills: 0 | Status: SHIPPED | OUTPATIENT
Start: 2018-03-16 | End: 2018-04-30

## 2018-03-16 ASSESSMENT — PATIENT HEALTH QUESTIONNAIRE - PHQ9
SUM OF ALL RESPONSES TO PHQ QUESTIONS 1-9: 0
2. FEELING DOWN, DEPRESSED OR HOPELESS: 0
1. LITTLE INTEREST OR PLEASURE IN DOING THINGS: 0
SUM OF ALL RESPONSES TO PHQ9 QUESTIONS 1 & 2: 0

## 2018-03-16 ASSESSMENT — ENCOUNTER SYMPTOMS: GASTROINTESTINAL NEGATIVE: 1

## 2018-03-16 NOTE — TELEPHONE ENCOUNTER
Calling to clarify Rx for Prednisone 10 mg, She needs to clarify that the directions are correct. If correct, there is one extra tablet. Please call.

## 2018-03-16 NOTE — PROGRESS NOTES
Subjective:      Patient ID: Kianna Bass is a 65 y.o. female. copd and resp failure    HPI  Has problems with dyspnea for 2 weeks with exertion and some times at nights and has wheezing at times  Has no orthopnea and has no pnd  Has cough with yellow sputum  Feels since she is off meds for Tb her breathing is not as good. Using 2-3 liters oxygen daily. Review of Systems   Constitutional: Negative for activity change, appetite change and unexpected weight change. Cardiovascular: Negative for chest pain, palpitations and leg swelling. Gastrointestinal: Negative. Genitourinary: Negative. Neurological: Negative for dizziness, light-headedness and headaches. Objective:   Physical Exam   Constitutional: She is oriented to person, place, and time. No distress. Eyes: Conjunctivae and EOM are normal. Pupils are equal, round, and reactive to light. No scleral icterus. Neck: No JVD present. No thyromegaly present. Cardiovascular: Normal rate, regular rhythm, normal heart sounds and intact distal pulses. Exam reveals no gallop. No murmur heard. Pulmonary/Chest: No respiratory distress. She has no wheezes. Rales: has few carckles elft abse and has forced exp wheezing and has decaresed bs both lungs. Musculoskeletal: She exhibits no edema. Lymphadenopathy:     She has no cervical adenopathy. Neurological: She is alert and oriented to person, place, and time. Nursing note and vitals reviewed.       Assessment:      Has mild exacerbation of copd  chronic resp failure stable ;'gerd controlled      Plan:      Start on prednisone ad in tapering doses  Continue current medications  See in 3 months

## 2018-04-11 ENCOUNTER — TELEPHONE (OUTPATIENT)
Dept: INTERNAL MEDICINE CLINIC | Age: 78
End: 2018-04-11

## 2018-04-11 RX ORDER — AMOXICILLIN AND CLAVULANATE POTASSIUM 875; 125 MG/1; MG/1
1 TABLET, FILM COATED ORAL 2 TIMES DAILY
Qty: 14 TABLET | Refills: 0 | Status: SHIPPED | OUTPATIENT
Start: 2018-04-11 | End: 2018-04-18

## 2018-04-26 ENCOUNTER — HOSPITAL ENCOUNTER (OUTPATIENT)
Dept: CT IMAGING | Age: 78
Discharge: HOME | End: 2018-04-26
Attending: INTERNAL MEDICINE | Admitting: INTERNAL MEDICINE

## 2018-04-26 DIAGNOSIS — R93.89 ABNORMAL FINDINGS ON DIAGNOSTIC IMAGING OF OTHER SPECIFIED BODY STRUCTURES: ICD-10-CM

## 2018-04-26 DIAGNOSIS — R91.8 LUNG NODULES: ICD-10-CM

## 2018-04-30 ENCOUNTER — OFFICE VISIT (OUTPATIENT)
Dept: PULMONOLOGY | Age: 78
End: 2018-04-30

## 2018-04-30 VITALS
BODY MASS INDEX: 24.17 KG/M2 | TEMPERATURE: 97 F | DIASTOLIC BLOOD PRESSURE: 63 MMHG | SYSTOLIC BLOOD PRESSURE: 131 MMHG | HEIGHT: 63 IN | RESPIRATION RATE: 20 BRPM | WEIGHT: 136.4 LBS | OXYGEN SATURATION: 94 % | HEART RATE: 83 BPM

## 2018-04-30 DIAGNOSIS — R91.8 LUNG NODULES: ICD-10-CM

## 2018-04-30 DIAGNOSIS — J96.11 CHRONIC RESPIRATORY FAILURE WITH HYPOXIA (HCC): ICD-10-CM

## 2018-04-30 DIAGNOSIS — J43.2 CENTRILOBULAR EMPHYSEMA (HCC): Primary | ICD-10-CM

## 2018-04-30 PROCEDURE — 99214 OFFICE O/P EST MOD 30 MIN: CPT | Performed by: INTERNAL MEDICINE

## 2018-04-30 RX ORDER — CEFDINIR 300 MG/1
300 CAPSULE ORAL 2 TIMES DAILY
Qty: 10 CAPSULE | Refills: 0 | Status: SHIPPED | OUTPATIENT
Start: 2018-04-30 | End: 2018-05-05

## 2018-04-30 RX ORDER — PREDNISONE 20 MG/1
TABLET ORAL
Qty: 17 TABLET | Refills: 0 | Status: SHIPPED | OUTPATIENT
Start: 2018-04-30 | End: 2018-07-30

## 2018-04-30 RX ORDER — PREDNISONE 20 MG/1
TABLET ORAL
Qty: 17 TABLET | Refills: 0 | Status: SHIPPED | OUTPATIENT
Start: 2018-04-30 | End: 2018-04-30 | Stop reason: SDUPTHER

## 2018-04-30 RX ORDER — CEFDINIR 300 MG/1
300 CAPSULE ORAL 2 TIMES DAILY
Qty: 10 CAPSULE | Refills: 0 | Status: SHIPPED | OUTPATIENT
Start: 2018-04-30 | End: 2018-04-30 | Stop reason: SDUPTHER

## 2018-06-06 RX ORDER — TIOTROPIUM BROMIDE 18 UG/1
CAPSULE ORAL; RESPIRATORY (INHALATION)
Qty: 90 CAPSULE | Refills: 1 | Status: SHIPPED | OUTPATIENT
Start: 2018-06-06 | End: 2019-01-25 | Stop reason: SDUPTHER

## 2018-06-22 ENCOUNTER — OFFICE VISIT (OUTPATIENT)
Dept: INTERNAL MEDICINE CLINIC | Age: 78
End: 2018-06-22

## 2018-06-22 VITALS
DIASTOLIC BLOOD PRESSURE: 76 MMHG | RESPIRATION RATE: 16 BRPM | WEIGHT: 139 LBS | HEART RATE: 90 BPM | OXYGEN SATURATION: 92 % | SYSTOLIC BLOOD PRESSURE: 130 MMHG | BODY MASS INDEX: 24.63 KG/M2 | HEIGHT: 63 IN

## 2018-06-22 DIAGNOSIS — R73.9 HYPERGLYCEMIA: ICD-10-CM

## 2018-06-22 DIAGNOSIS — J96.11 CHRONIC RESPIRATORY FAILURE WITH HYPOXIA (HCC): ICD-10-CM

## 2018-06-22 DIAGNOSIS — J44.9 CHRONIC OBSTRUCTIVE PULMONARY DISEASE, UNSPECIFIED COPD TYPE (HCC): Primary | ICD-10-CM

## 2018-06-22 DIAGNOSIS — M85.89 OSTEOPENIA OF MULTIPLE SITES: ICD-10-CM

## 2018-06-22 LAB
GLUCOSE BLD-MCNC: 115 MG/DL
HBA1C MFR BLD: 6.8 %

## 2018-06-22 PROCEDURE — 99213 OFFICE O/P EST LOW 20 MIN: CPT | Performed by: INTERNAL MEDICINE

## 2018-06-22 PROCEDURE — 83036 HEMOGLOBIN GLYCOSYLATED A1C: CPT | Performed by: INTERNAL MEDICINE

## 2018-06-22 PROCEDURE — 82962 GLUCOSE BLOOD TEST: CPT | Performed by: INTERNAL MEDICINE

## 2018-06-22 ASSESSMENT — ENCOUNTER SYMPTOMS
GASTROINTESTINAL NEGATIVE: 1
SHORTNESS OF BREATH: 1
COUGH: 0
TROUBLE SWALLOWING: 0
CHEST TIGHTNESS: 0
WHEEZING: 1

## 2018-07-25 ENCOUNTER — HOSPITAL ENCOUNTER (OUTPATIENT)
Dept: CT IMAGING | Age: 78
Discharge: HOME | End: 2018-07-25
Attending: INTERNAL MEDICINE | Admitting: INTERNAL MEDICINE

## 2018-07-25 ENCOUNTER — HOSPITAL ENCOUNTER (OUTPATIENT)
Dept: WOMENS IMAGING | Age: 78
Discharge: HOME | End: 2018-07-25
Attending: INTERNAL MEDICINE | Admitting: INTERNAL MEDICINE

## 2018-07-25 DIAGNOSIS — M85.89 OSTEOPENIA OF MULTIPLE SITES: ICD-10-CM

## 2018-07-25 DIAGNOSIS — M85.89 OTHER SPECIFIED DISORDERS OF BONE DENSITY AND STRUCTURE, MULTIPLE SITES: ICD-10-CM

## 2018-07-25 DIAGNOSIS — R91.8 LUNG NODULES: ICD-10-CM

## 2018-07-25 DIAGNOSIS — R91.8 OTHER NONSPECIFIC ABNORMAL FINDING OF LUNG FIELD (CODE): ICD-10-CM

## 2018-07-25 RX ORDER — B-COMPLEX WITH VITAMIN C
1 TABLET ORAL 2 TIMES DAILY
Qty: 1 TABLET | Refills: 0
Start: 2018-07-25 | End: 2018-07-25 | Stop reason: ALTCHOICE

## 2018-07-26 ENCOUNTER — TELEPHONE (OUTPATIENT)
Dept: INTERNAL MEDICINE CLINIC | Age: 78
End: 2018-07-26

## 2018-07-26 DIAGNOSIS — I31.39 PERICARDIAL EFFUSION: Primary | ICD-10-CM

## 2018-07-30 ENCOUNTER — OFFICE VISIT (OUTPATIENT)
Dept: PULMONOLOGY | Age: 78
End: 2018-07-30

## 2018-07-30 VITALS
TEMPERATURE: 96.9 F | WEIGHT: 138.6 LBS | HEART RATE: 106 BPM | DIASTOLIC BLOOD PRESSURE: 70 MMHG | BODY MASS INDEX: 24.56 KG/M2 | HEIGHT: 63 IN | SYSTOLIC BLOOD PRESSURE: 133 MMHG | OXYGEN SATURATION: 92 % | RESPIRATION RATE: 20 BRPM

## 2018-07-30 DIAGNOSIS — J43.2 CENTRILOBULAR EMPHYSEMA (HCC): ICD-10-CM

## 2018-07-30 DIAGNOSIS — R91.8 LUNG NODULES: ICD-10-CM

## 2018-07-30 DIAGNOSIS — R06.02 SHORTNESS OF BREATH: Primary | ICD-10-CM

## 2018-07-30 DIAGNOSIS — J96.11 CHRONIC RESPIRATORY FAILURE WITH HYPOXIA (HCC): ICD-10-CM

## 2018-07-30 PROCEDURE — 99214 OFFICE O/P EST MOD 30 MIN: CPT | Performed by: INTERNAL MEDICINE

## 2018-07-30 RX ORDER — CEFUROXIME AXETIL 250 MG/1
250 TABLET ORAL 2 TIMES DAILY
Qty: 10 TABLET | Refills: 0 | Status: SHIPPED | OUTPATIENT
Start: 2018-07-30 | End: 2018-08-04

## 2018-07-30 RX ORDER — PREDNISONE 20 MG/1
TABLET ORAL
Qty: 11 TABLET | Refills: 0 | Status: SHIPPED | OUTPATIENT
Start: 2018-07-30 | End: 2019-01-29

## 2018-07-30 RX ORDER — LEVOTHYROXINE SODIUM 0.1 MG/1
TABLET ORAL
Qty: 90 TABLET | Refills: 1 | Status: SHIPPED | OUTPATIENT
Start: 2018-07-30 | End: 2019-02-20 | Stop reason: SDUPTHER

## 2018-07-30 NOTE — PATIENT INSTRUCTIONS
Please take the prednisone and antibiotic.  Please let me know how you are feeling chao few days    Please come for follow-up visit in 3 months

## 2018-07-30 NOTE — PROGRESS NOTES
Chief complaint  This is a 65y.o. year old female  who presents with a chief complaint of   Chief Complaint   Patient presents with    Follow-up     Emphysema       HPI  76-year-old woman with moderate COPD (FEV1 1.36 L, 69% predicted), chronic hypoxic respiratory failure (on 2 L nasal cannula) and Mycobacterium avium complex infection (completed 18 months of therapy) presents for follow-up. She says for the past week she has noticed increased exertional shortness of breath. Shortness of breath occurs with walking a few feet. It improves with rest. She has noted a cough with white/pale yellow mucus. She is compliant with Advair and Spiriva. She used albuterol nebulizer about 3 times in the past week and the 3% saline nebulizer about 2 times in the past week. She has been using 2-3 L nasal cannula. Past Medical History:   Diagnosis Date    COPD (chronic obstructive pulmonary disease) ([address]     GERD (gastroesophageal reflux disease)     High cholesterol     Osteoporosis        Past Surgical History:   Procedure Laterality Date     SECTION      4 times    COLONOSCOPY  .     redundant colon       Current Outpatient Prescriptions   Medication Sig Dispense Refill    aspirin 81 MG tablet Take 81 mg by mouth every 48 hours as needed for Pain      predniSONE (DELTASONE) 20 MG tablet Take 40mg daily for 3 days, then 20mg daily for 3 days, then 10mg daily for 3 days, then stop 11 tablet 0    cefUROXime (CEFTIN) 250 MG tablet Take 1 tablet by mouth 2 times daily for 5 days 10 tablet 0    Calcium Carb-Cholecalciferol 600-500 MG-UNIT CAPS Take 1 tablet by mouth 2 times daily 1 capsule 0    SPIRIVA HANDIHALER 18 MCG inhalation capsule INHALE THE CONTENTS OF ONE CAPSULE DAILY 90 capsule 1    ADVAIR DISKUS 500-50 MCG/DOSE diskus inhaler INHALE 1 PUFF TWICE DAILY 3 Inhaler 1    montelukast (SINGULAIR) 10 MG tablet TAKE 1 TABLET NIGHTLY 90 tablet 1    atorvastatin (LIPITOR) 20 MG tablet TAKE 1 TABLET (9/16/13)  FVC 2.69 L at 97% predicted    FEV1 1.23 L at 59% predicted  FEV1/FVC ratio at 44%  TLC 4.4 L at 94% predicted      Overall: Moderately severe obstruction, normal lung volumes (diffusion is reported as reduced)          Images and reports of chest imaging were reviewed by me. My interpretation is:  CXR (2/12/16): Increased markings in bilateral lower lung zones and left mid lung zone  Chest CT (4/26/18): Prominent mediastinal nodes; centrilobular emphysema; nodular density in the minor fissure (unchanged compared to 4/2017); amorphous opacity in the left upper lobe (increased compared to 4/2107); RLL nodule (unchanged compared to 4/2017); nodular density in the LLL (stable compared to 4/2017)  Chest CT (7/25/18): No LAD; centrilobular emphysema; nodular density in the right minor fissure and right lower lobe (unchanged compared to April 2017); left lower lobe nodule (unchanged compared to April 2017); left apical nodular density has resolved compared to April 2018      Lab Results   Component Value Date    WBC 8.6 09/14/2017    HGB 14.6 09/14/2017    HCT 44.1 09/14/2017    MCV 91.6 09/14/2017     09/14/2017       No results found for: BNP    Lab Results   Component Value Date    CREATININE 0.7 09/14/2017    BUN 15 09/14/2017     09/14/2017    K 4.3 09/14/2017     09/14/2017    CO2 26 09/14/2017         Assessment/Plan:65y.o. year old female presents for follow up. Shortness of breath-  Will prescribe a 9 day taper of prednisone and 5 days of Ceftin. I have asked her to call me in a few days to let me know she feels. COPD - Has moderate lower airflow obstruction on PFTs. Continue Advair 500/50 mcg twice daily and Spiriva daily. Continue albuterol inhaler, albuterol nebulizer and 3% saline nebulizer as needed. She has completed pulmonary rehabilitation. Chronic hypoxic respiratory failure - She will continue 2-3 L nasal cannula continuously.     Lung nodules - Has bilateral

## 2018-08-03 ENCOUNTER — TELEPHONE (OUTPATIENT)
Dept: PULMONOLOGY | Age: 78
End: 2018-08-03

## 2018-08-03 NOTE — TELEPHONE ENCOUNTER
Patient was prescribed an antibiotic and steroid at her 7/301/8 appt. And advised to call with update.

## 2018-08-03 NOTE — TELEPHONE ENCOUNTER
Patient called in to give an update to Dr. Tavo Falcon after her appointment and she said that her breathing is doing somewhat better.

## 2018-08-15 RX ORDER — MONTELUKAST SODIUM 10 MG/1
TABLET ORAL
Qty: 90 TABLET | Refills: 3 | Status: SHIPPED | OUTPATIENT
Start: 2018-08-15 | End: 2019-06-12 | Stop reason: SDUPTHER

## 2018-08-17 ENCOUNTER — HOSPITAL ENCOUNTER (OUTPATIENT)
Dept: NON INVASIVE DIAGNOSTICS | Age: 78
Discharge: HOME | End: 2018-08-17
Attending: INTERNAL MEDICINE | Admitting: INTERNAL MEDICINE

## 2018-08-17 DIAGNOSIS — I31.39 NONINFLAMMATORY PERICARDIAL EFFUSION: ICD-10-CM

## 2018-08-17 DIAGNOSIS — I31.39 PERICARDIAL EFFUSION: ICD-10-CM

## 2018-08-17 LAB
LV EF: 60 %
LVEF MODALITY: NORMAL

## 2018-09-20 ENCOUNTER — TELEPHONE (OUTPATIENT)
Dept: INTERNAL MEDICINE CLINIC | Age: 78
End: 2018-09-20

## 2018-09-20 RX ORDER — OMEPRAZOLE 40 MG/1
CAPSULE, DELAYED RELEASE ORAL
Qty: 90 CAPSULE | Refills: 1 | Status: SHIPPED | OUTPATIENT
Start: 2018-09-20 | End: 2019-02-28 | Stop reason: SDUPTHER

## 2018-10-05 ENCOUNTER — OFFICE VISIT (OUTPATIENT)
Dept: INTERNAL MEDICINE CLINIC | Age: 78
End: 2018-10-05
Payer: COMMERCIAL

## 2018-10-05 VITALS
DIASTOLIC BLOOD PRESSURE: 68 MMHG | HEIGHT: 63 IN | HEART RATE: 82 BPM | SYSTOLIC BLOOD PRESSURE: 130 MMHG | BODY MASS INDEX: 24.73 KG/M2 | OXYGEN SATURATION: 92 % | RESPIRATION RATE: 16 BRPM | WEIGHT: 139.6 LBS

## 2018-10-05 DIAGNOSIS — J96.11 CHRONIC RESPIRATORY FAILURE WITH HYPOXIA (HCC): ICD-10-CM

## 2018-10-05 DIAGNOSIS — J44.9 CHRONIC OBSTRUCTIVE PULMONARY DISEASE, UNSPECIFIED COPD TYPE (HCC): Primary | ICD-10-CM

## 2018-10-05 DIAGNOSIS — E03.9 HYPOTHYROIDISM, UNSPECIFIED TYPE: ICD-10-CM

## 2018-10-05 DIAGNOSIS — K21.9 GASTROESOPHAGEAL REFLUX DISEASE WITHOUT ESOPHAGITIS: ICD-10-CM

## 2018-10-05 DIAGNOSIS — R73.9 HYPERGLYCEMIA: ICD-10-CM

## 2018-10-05 LAB
GLUCOSE BLD-MCNC: 97 MG/DL
HBA1C MFR BLD: 6.4 %

## 2018-10-05 PROCEDURE — 99213 OFFICE O/P EST LOW 20 MIN: CPT | Performed by: INTERNAL MEDICINE

## 2018-10-05 PROCEDURE — 83036 HEMOGLOBIN GLYCOSYLATED A1C: CPT | Performed by: INTERNAL MEDICINE

## 2018-10-05 PROCEDURE — 82962 GLUCOSE BLOOD TEST: CPT | Performed by: INTERNAL MEDICINE

## 2018-10-05 ASSESSMENT — ENCOUNTER SYMPTOMS
WHEEZING: 1
CHEST TIGHTNESS: 0
CONSTIPATION: 1
SHORTNESS OF BREATH: 1
COUGH: 1

## 2018-10-23 ENCOUNTER — TELEPHONE (OUTPATIENT)
Dept: INTERNAL MEDICINE CLINIC | Age: 78
End: 2018-10-23

## 2018-10-23 ENCOUNTER — HOSPITAL ENCOUNTER (OUTPATIENT)
Dept: CARDIAC REHAB | Age: 78
Setting detail: THERAPIES SERIES
Discharge: HOME | End: 2018-10-23
Payer: COMMERCIAL

## 2018-10-23 DIAGNOSIS — J44.9 CHRONIC OBSTRUCTIVE PULMONARY DISEASE, UNSPECIFIED COPD TYPE (HCC): Primary | ICD-10-CM

## 2018-10-23 DIAGNOSIS — J96.11 CHRONIC RESPIRATORY FAILURE WITH HYPOXIA (HCC): ICD-10-CM

## 2018-10-23 PROCEDURE — 94618 PULMONARY STRESS TESTING: CPT | Performed by: INTERNAL MEDICINE

## 2018-10-25 ENCOUNTER — HOSPITAL ENCOUNTER (OUTPATIENT)
Dept: CARDIAC REHAB | Age: 78
Setting detail: THERAPIES SERIES
Discharge: HOME | End: 2018-10-25
Payer: COMMERCIAL

## 2018-10-25 VITALS — WEIGHT: 139 LBS | BODY MASS INDEX: 24.62 KG/M2

## 2018-10-25 PROCEDURE — G0424 PULMONARY REHAB W EXER: HCPCS

## 2018-10-25 NOTE — PROGRESS NOTES
[facility] Facility-Based Therapy for COPD  INDIVIDUAL TREATMENT PLAN (ITP)     NAME: Richard Amos  SAUL MCDONALDB: 1940  Account Number: [de-identified]  AGE: 77 y.o. 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 PFT:  FEV1/FVC : 46%, FEV1: 66% FVC: 108%  Notes: Medicare requirements for Pulmonary Rehabilitation include a PFT within the last 12 months revealing: FEV1/FVC <70, and FEV1 <80%, and documentation from the referring physician stating that the patient has quit smoking or will participate in smoking cessation activities prior to, or during the Pulmonary Rehab program.  A 6 Minute Walk Test (6 MWT) at the beginning and end of the program is also indicated. GLOSSARY: PF= Physical Fitness  TM=Treadmill  AD= [employer] AirD[employer] Bike  UBE=Upperbody Ergometry LBE=Lowerbody Ergometer  NS=NuStep SF= SciFit  ST=Step Training  RT=Resistance Training   PLB=Pursed Lip Breathing   DY= Dynabands  HW= Handweights   Cybex RT= Cybex Mult istation weight machine   RB=Recumbent    REFERRING PHYSICIAN: Dr. Ramon Aquino History:     Past Medical History:   Diagnosis Date    COPD (chronic obstructive pulmonary disease) (HCC)     GERD (gastroesophageal reflux disease)     High cholesterol     Osteoporosis        Surgical History:     Past Surgical History:   Procedure Laterality Date     SECTION      4 times    COLONOSCOPY  .     redundant colon       Major Symptoms:     Cough/sputum Yes- chronic productive cough usually in the evening/light yellow sputum      Wheezing  No     Chest Discomfort  No     Orthopnea  No     Sleep Disturbances Yes- has trouble falling asleep and wakes up 3 times nightly to urinate     Edema   No     Dyspnea  Yes- exertional  Oxygen Therapy:     Home O2   3 lpm  []  Prn  [] continuous  []  HS     [] CPAP []  BiPAP     Company:  [employer]        Comments:    Occupational/ Recreational Activities:    Employment Status:  []  FT  []  PT  [] Disabled [x] Retired    Comments:       Occupation: Housecleaning, restraunt          Hobbies/Leisure activities: [place]:        Social History     Social History    Marital status:      Spouse name: N/A    Number of children: N/A    Years of education: N/A     Social History Main Topics    Smoking status: Former Smoker     Packs/day: 1.50     Years: 50.00     Types: Cigarettes     Start date: 1/1/1951     Quit date: 9/6/1999    Smokeless tobacco: Never Used      Comment: quit 10 years ago    Alcohol use No    Drug use: No    Sexual activity: Not on file     Other Topics Concern    Not on file     Social History Narrative    No narrative on file          Do you live alone: []  Alone [x]  with spouse/family       Do you have stairs in your home  [x]  no []  yes        Comment:       Transportation  [x]  drive self []  family/friend     Comment:       Cultural/Spiritual Influences: (Druze groups, etc)       Any Cultural or Hinduism practices we should be aware of?                      Fall Risk Assessment:     []  Cognitive Impairment []  Balance/Gait Disturbance   []  Fall History   []  Visual Difficulty   []  Dizziness   []  Other Comments:    Physical Assessment:    Color: [x]  natural []  pale [] cyanotic []  flushed []  jaundice    Skin Integrity [x]  intact [] other:    Heart Rate 74  Resp Rate 20      Breath Sounds: Diminished    Blood Pressures Sitting: Rt arm 118/64  Left arm: 124/60       Standing Rt arm   Left arm: 128/66    Resting SpO2: 97% 3L  Resp effort/pattern: Easy/regular at rest      Peripheral pulses: Yes    Edema:  No    Numbness/tingling:  No    Orthopedic/exercise limitations:  No      Psychosocial assessment:    Support System: , 4 sons, and 31 grandchildren    Physical/Behavioral signs of abuse/neglect:No    Advance Directives:  Yes  [] info given    Learning Preferences: Primary Other  Components:    [x]  Environmental Factors    [x]  Prevention Management of Exacerbations    []  CHF Management    []  Hypertension Management     Intervention/  Education                Kathy's INDIVIDUAL TREATMENT PLAN  OXYGEN AND MEDICATIONS    OXYGEN  MEDICATIONS   Initial Assessment  Day 1 OXYGEN  MEDICATIONS  Intervention  Day 2-30 OXYGEN  MEDICATION  Re-Assessment  Day 31-60 OXYGEN  MEDICATION  ReAssessment Day 61-90+ OXYGEN  MEDICATION  Discharge  Final Day                    Medications  [x]  Uses as prescribed  []  Non- compliant with prescribed meds    Respiratory Medications    [x]  Proper use   and technique of MDI, DPI and/or nebs  []  Incorrect use and technique of MDI, DPI and /or nebs    Hypoxemia  Problem:    []  No problem  [] Noncompliant with O2 use  []  Oxygen in [place] only  []  No home O2  []  No portable O2  []  Needs O2 prescription and O2 qualifying data sent for setup   ()Poor knowledge of O2 use/safety    Current Oxygen Prescription:   3-4 l/min rest  8 l/min exercise   titration   plan/weaning plan:  CPAP/BIPAP:    Goals:     [x]  Manage Hypoxemia  []  receive adequate portable system  [x]  Compliant with use as prescribed  []  Learn O2 safety guidelines   Medications  []  Uses as prescribed  []  Non- compliant with prescribed meds    Respiratory Medications    [] Proper use and technique of MDI, DPI and/or nebs  []  Incorrect use and technique of MDI, DPI and /or nebs    Hypoxemia  Problem:      Current Oxygen Prescription:    l/min rest   l/min exercise   titration plan/weaning plan:    Goals:   []  Manage Hypoxemia  []  receive adequate portable system  []  Compliant with use as prescribed  []  Learn O2 safety guidelines           Medications  []  Uses as prescribed  [] Non- compliant with prescribed meds    Respiratory Medications    []  Proper use and technique of MDI, DPI and/or nebs  [] Incorrect use and technique of MDI, DPI and /or nebs    Hypoxemia  Problem:      Current Oxygen Prescription:    l/min rest   l/min exercise   titration plan/weaning plan:    Goals:   []  Manage Hypoxemia  []  receive adequate portable system  []  Compliant with use as prescribed  [] Learn O2 safety guidelines           Medications  []  Uses as prescribed  []  Non- compliant with prescribed meds    Respiratory Medications    []  Proper use and technique of MDI, DPI and/or nebs  []  Incorrect use and technique of MDI, DPI and /or nebs    Hypoxemia  Problem:      Current Oxygen Prescription:    l/min rest   l/min exercise   titration plan/weaning plan:    Goals:   []  Manage Hypoxemia  []  receive adequate portable system  [] Compliant with use as prescribed  []  Learn O2 safety guidelines     Medications    [] Compliant  []  Noncompliant       Respiratory Medications    []  Compliant  [] Noncompliant              Hypoxemia  Problem:    []  Compliant  []  Noncompliant    Final Oxygen Prescription:    l/min rest   l/min exercise                                                           Kathy's INDIVIDUAL TREATMENT PLAN  NUTRITION DOMAIN:        NUTRITION   Initial Assessment  Day 1 NUTRITION   Intervention  Day 2-30 NUTRITION   Re-Assessment  Day 31-60 NUTRITION   Re-Assessment Day 61-90+ NUTRITION Discharge  Final Day   Weight Management:  139 lbs  Current BMI Weight Management:   lbs  Current BMI Weight Management:    lbs  Current BMI Weight Management:    lbs  Current BMI Weight Management:    lbs  Current BMI   Diabetes?: No  A1C   Fasting BS:   Insulin?:    Oral agent? Diabetes:  If y, has patient seen a positive trend in FBS?:      Intervention    [x] Basic nutrition education   [] Referral to Diabetes Education? [] Referral to weightloss/nutrition education     Intervention    [] Pt has had Nutrition class? [] Informal questions asked regarding nutrition/weight control Intervention    []  Pt has had Nutrition class?    [] Questions addressed identified? [x]  PCP notified of PHQ-9 results      []  On Anti-anxiety / anti-depression meds?: No         Intervention  If S/S of depression present, action taken:     Is the patient receiving support for the MO program at home?:  []  Y    [] N    Is the patient tolerating the intensity and duration of the MO program?:     [] Y   [] N Re-Assessment  [] S/S of depression present?   If [x] , action:         Med Changes?:   [] Y   [] N        Is the patient tolerating the intensity and duration of the MO program?:    []  Y    [] N  Re-Assessment  Psych Issue notes:        Is the patient receiving support for the MO program at home?:  []  Y  [] N    Is the patient tolerating the intensity and duration of the MO program?:  []  Y   []  N     Final Assessment                       Results of any Physician/ Counselor Intervention?:     [] Y   [] N         GOALS        30 DAY TARGET GOAL    [x]  Assess Mental Score using   SF-36 and PHQ-9   [x]  Teach PLB  [x]  Reassure pt that (s)he can stop and rest, adjust the speeds, and/or switch modalities from our suggestions  -    (0 being 'None', 10 being 'Very'),  -How would you rate your Anxiety Level: 4      -How would you rate your level of  depression: 1    -How would you rate your mood: (0 is negative all the time, 10 is positive all the time):  5          ADL's  Assess PF score on SF-36    Score= 0    From [facility], ADL pt struggles with most: changing sheets on bed    Out of 10, rate your ability to do that ADL now.  5     GOALS        Did pt meet Initial 30 day   Target Goal(s)?:       30 DAY NEW TARGET GOAL    [] Decrease/Maintain anxiety and depression level  [] Increase ability to complete ADL  [] Encourage pt to rate exercises truthfully to encourage correct intensity / duration prescription  -  (0 being 'None', 10 being 'Very'),  -How would you rate your Anxiety Level:      -How would you rate your level of depression:       -How would you rate your mood: GOALS        Did pt meet previous 30 day Target Goal(s)?:      RE- ASSESSMENT GOAL    [] Decrease/  Maintain anxiety and depression level  [] Increase ability to complete ADL  -          (0 being 'None', 10 being 'Very'),  -How would you rate your Anxiety Level:      -How would you rate your level of depression:    -How would you rate your mood: GOALS        Did pt meet previous 30 day Target Goal(s)?:      RE- ASSESSMENT GOAL    [] Decrease/  Maintain anxiety and depression level  [] Increase ability to complete ADL  -          (0 being 'None', 10 being 'Very'),  -How would you rate your Anxiety Level:      -How would you rate your level of depression:    -How would you rate your mood:     Discharge            Did pts SF-36 Mental Score increase?:          [] Pt is aware of the s/s of depression    [] Received Psychosocial Education    Any follow up with physicians  necessary?:      [] Y    [] N               Kathy's INDIVIDUAL TREATMENT PLAN  EDUCATION DOMAIN:    EDUCATION   Initial Assessment  Day 1 EDUCATION   Intervention  Day 2-30 EDUCATION   Re-Assessment  Day 31-60 EDUCATION   ReAssessment Day 61-90+ EDUCATION Discharge  Final Day                      Education  [x] Equipment/Staff Orientation  [x] Breathing Retraining  [x] Importance of Clean Hands    Chronic Cough?:   [x] Y   []  N  Spacer?:   [x]   Y   []  N    Sleep Apnea?:  [] Y    [x] N     [x] Education Book given       Education  Individual instruction  [] PLB  [] RPD/RPE   [] O2 use  []  review PFT  [] Inhalers   []Home Activity/Warm up/ stretches  Attend Classes:  [] Nutrition  [] Panic control, relaxation, managing depression  [] A&P of the Respiratory System   [] Environ. Issues+ Travel   [] Bronchial Hygiene / Preventing Infection  [] Resistance Training  []  Benefits of Exercise  [] Energy Cons        Education  Individual instruction  [] PLB  [] RPD/RPE   [] O2 use  []  review PFT  [] Inhalers   [] Home Activity/Warm up/ stretches  Attend RPE.    Oxygen: Kathy will exercise on 8L HF based on 6mw results. Medications affecting HR: Proair MDI, Albuterol HHN    Nutrition: WT. 139# Up over past 1.5 yrs from her normal 126#. She will attend General Nutrition Class. Psychosocial including Dyspnea with ADL's, Depression/Anxiety and Social Functioning: Kathy lives at home with her . She has 4 sons and many grandchildren who are very supportive. She reports mild anxiety/depression due to her current health and limitations. She does continue to be socially active, playing Eucher weekly with friends. Will encourage socialization during [place]. Other:                   Referring Physician: Dr. Sanchez Goes                                            Fax #: [phone]    Reviewed and electronically signed by Dr Iftikhar Smallwood.  Sophia Luque, Medical Director

## 2018-10-25 NOTE — PROGRESS NOTES
nebs    Hypoxemia  Problem:      Current Oxygen Prescription:    l/min rest   l/min exercise   titration plan/weaning plan:    Goals:   []  Manage Hypoxemia  []  receive adequate portable system  []  Compliant with use as prescribed  [] Learn O2 safety guidelines           Medications  []  Uses as prescribed  []  Non- compliant with prescribed meds    Respiratory Medications    []  Proper use and technique of MDI, DPI and/or nebs  []  Incorrect use and technique of MDI, DPI and /or nebs    Hypoxemia  Problem:      Current Oxygen Prescription:    l/min rest   l/min exercise   titration plan/weaning plan:    Goals:   []  Manage Hypoxemia  []  receive adequate portable system  [] Compliant with use as prescribed  []  Learn O2 safety guidelines     Medications    [] Compliant  []  Noncompliant       Respiratory Medications    []  Compliant  [] Noncompliant              Hypoxemia  Problem:    []  Compliant  []  Noncompliant    Final Oxygen Prescription:    l/min rest   l/min exercise                                                           Kathy's INDIVIDUAL TREATMENT PLAN  NUTRITION DOMAIN:        NUTRITION   Initial Assessment  Day 1 NUTRITION   Intervention  Day 2-30 NUTRITION   Re-Assessment  Day 31-60 NUTRITION   Re-Assessment Day 61-90+ NUTRITION Discharge  Final Day   Weight Management:  139 lbs  Current BMI Weight Management:   lbs  Current BMI Weight Management:    lbs  Current BMI Weight Management:    lbs  Current BMI Weight Management:    lbs  Current BMI   Diabetes?: No  A1C   Fasting BS:   Insulin?:    Oral agent? Diabetes:  If y, has patient seen a positive trend in FBS?:      Intervention    [x] Basic nutrition education   [] Referral to Diabetes Education? [] Referral to weightloss/nutrition education     Intervention    [] Pt has had Nutrition class? [] Informal questions asked regarding nutrition/weight control Intervention    []  Pt has had Nutrition class?    [] Questions addressed Intervention    []  Pt has had Nutrition class? Intervention    Pt aware of:     [] Pulmonary eating techniques   [] Smart choices, Calories   []Gas-producing foods   [] Wt gain / loss strategies     GOALS    Weight Loss         30 DAY TARGET GOAL:    [x] Decrease portion size by 250-500 calories/day  [x] Increase fluid intake to 6-8 8oz. [x] Eat less sweets      Reasonable, achievable 30 day weight goal: 135 lbs   (1-2 lb per week is recommended)            Weight Gain        30 day   Target Goal:    [] increase proteins  [] add nutrition  Supplement  [] 6 small meals      Did pt meet Initial 30 day Target Goal(s)?:     New 30 DAY TARGET GOAL:    [] Decrease saturated fats  [] Decrease gas producing  cruciferous veggies   -  Reasonable, achievable 30 day weight goal:     Did pt meet previous 30 day Target   Goal(s)?:     New 30 DAY TARGET GOAL:    [] Switch to whole grains whenever possible  [] Decrease/ Eliminate carbonated beverages    Reasonable, achievable 30 day weight goal:    Did pt meet previous 30 day Target   Goal(s)?:      New 30 DAY TARGET GOAL:    [] Smaller meals more frequently  [] Decrease portion sizes by 25%      Reasonable, achievable 30 day weight goal:                  Did pt meet previous 30 day Target   Goal(s)?:                         Kathy's INDIVIDUAL TREATMENT PLAN  PSYCHOSOCIAL DOMAIN:    Psychosocial   Initial Assessment  Day 1 Psychosocial   Intervention  Day 2-30 Psychosocial  Re-Assessment  Day 31-60 Psychosocial   Re-Assessment  Day 61-90+ Psychosocial Discharge  Final Day   Psychosocial Screening Tools    (X) PHQ-9 score: 11      (X) SF-36: 68       (X) UCSD SOB score: 98         PHQ-9 Score: If score >or =15, Action:     SF-36 Mental Score:     UCSD Score:             Any action on Screening Tools?:                  Psychosocial Screening   Tools    Repeat PHQ-9 Score if kaylen:    Repeat SF-36      Repeat UCSD SOB Score:       Assessment  []  S/S of depression Classes:  [] Nutrition  []  Panic conntrol, relaxation, managing depression  []  A&P of the Respiratory System   [] Environ. Issues+ Travel   [] Bronchial Hygiene / Preventing Infection  []  Resistance Training  [] Benefits of Exercise  [] Energy Cons    Education  Individual instruction  [] PLB  [] RPD/RPE   []  O2 use  []  review PFT  [] Inhalers   [] Home Activity/Warm up/ stretches  Attend Classes:  [] Nutrition  []  Panic conntrol, relaxation, managing depression  [] A&P of the Respiratory System   [] Environ. Issues+ Travel   [] Bronchial Hygiene / Preventing Infection  []  Resistance Training  [] Benefits of Exercise  [] Energy Cons   Education  []  Addressed pt questions on Education Topics                                                         Physician Interaction / Response:  (If none, continue on present care plan)     Physician Interaction / Response:   (If none, continue on present care plan)   Physician Interaction / Response:   (If none, continue on present care plan)   Physician Interaction / Response:   (If none, continue on present care plan)   Physician Interaction / Response:       Discharge the patient. Rehab Potential:  [x]  Good [] Fair [] Poor    Summary 10/28/18  Michael Crabtree is a pleasant 77 yr old female with COPD who has been referred to Pulmonary Rehab. She presents today for her initial evaluation. She reports increased sob with exertion as well as decreased endurance and strength over the past year and a half since being hospitalized with pneumonia. As a result, she has become less active and her wt has gone up 10-15lbs. She has been on oxygen 3-4L at home since then. Her 6mw demonstrated the need for 8L with exertion and she walked 800ft. She is eager to increase her activity tolerance and get back to being more active. She will attend 28 sessions of OK twice weekly. Exercise:30-45 min of exercise starting at low levels.  Time and intensity to be increased base on

## 2018-10-30 ENCOUNTER — HOSPITAL ENCOUNTER (OUTPATIENT)
Dept: CARDIAC REHAB | Age: 78
Setting detail: THERAPIES SERIES
Discharge: HOME | End: 2018-10-30
Payer: COMMERCIAL

## 2018-10-30 VITALS — WEIGHT: 140.6 LBS | BODY MASS INDEX: 24.91 KG/M2

## 2018-10-30 PROCEDURE — G0424 PULMONARY REHAB W EXER: HCPCS

## 2018-11-01 ENCOUNTER — HOSPITAL ENCOUNTER (OUTPATIENT)
Dept: CARDIAC REHAB | Age: 78
Setting detail: THERAPIES SERIES
Discharge: HOME | End: 2018-11-01
Payer: COMMERCIAL

## 2018-11-01 VITALS — WEIGHT: 140 LBS | BODY MASS INDEX: 24.8 KG/M2

## 2018-11-01 PROCEDURE — G0424 PULMONARY REHAB W EXER: HCPCS

## 2018-11-05 ENCOUNTER — OFFICE VISIT (OUTPATIENT)
Dept: PULMONOLOGY | Age: 78
End: 2018-11-05
Payer: COMMERCIAL

## 2018-11-05 VITALS
OXYGEN SATURATION: 97 % | DIASTOLIC BLOOD PRESSURE: 60 MMHG | BODY MASS INDEX: 25.02 KG/M2 | HEIGHT: 63 IN | RESPIRATION RATE: 20 BRPM | SYSTOLIC BLOOD PRESSURE: 120 MMHG | TEMPERATURE: 96.9 F | HEART RATE: 92 BPM | WEIGHT: 141.2 LBS

## 2018-11-05 DIAGNOSIS — J43.2 CENTRILOBULAR EMPHYSEMA (HCC): Primary | ICD-10-CM

## 2018-11-05 DIAGNOSIS — R91.8 LUNG NODULES: ICD-10-CM

## 2018-11-05 DIAGNOSIS — J96.11 CHRONIC RESPIRATORY FAILURE WITH HYPOXIA (HCC): ICD-10-CM

## 2018-11-05 DIAGNOSIS — J20.9 ACUTE BRONCHITIS, UNSPECIFIED ORGANISM: ICD-10-CM

## 2018-11-05 PROCEDURE — 99214 OFFICE O/P EST MOD 30 MIN: CPT | Performed by: INTERNAL MEDICINE

## 2018-11-05 RX ORDER — FLUTICASONE FUROATE AND VILANTEROL 100; 25 UG/1; UG/1
1 POWDER RESPIRATORY (INHALATION) DAILY
Qty: 1 EACH | Refills: 0 | COMMUNITY
Start: 2018-11-05 | End: 2019-01-29 | Stop reason: SDUPTHER

## 2018-11-05 RX ORDER — DOXYCYCLINE HYCLATE 100 MG/1
100 CAPSULE ORAL 2 TIMES DAILY
Qty: 14 CAPSULE | Refills: 0 | Status: SHIPPED | OUTPATIENT
Start: 2018-11-05 | End: 2018-11-12

## 2018-11-05 RX ORDER — FLUTICASONE FUROATE AND VILANTEROL 100; 25 UG/1; UG/1
1 POWDER RESPIRATORY (INHALATION) DAILY
Qty: 1 EACH | Refills: 5 | Status: SHIPPED | OUTPATIENT
Start: 2018-11-05 | End: 2019-03-25 | Stop reason: SDUPTHER

## 2018-11-05 NOTE — PROGRESS NOTES
Social History Narrative    No narrative on file       Immunization History   Administered Date(s) Administered    Influenza Nasal 11/01/2014    Influenza Vaccine, unspecified formulation 11/15/2016    Influenza Virus Vaccine 11/01/2014    Influenza Whole 10/01/2008    Influenza, High Dose (Fluzone 65 yrs and older) 10/12/2015, 11/15/2016    Pneumococcal 13-valent Conjugate ([id number]) 04/15/2016    Pneumococcal Conjugate 7-valent 11/01/2005, 10/11/2010    Pneumococcal Polysaccharide ([id number]) 02/26/2017       ROS:  GENERAL:  No fevers, no chills, +2lb weight gain in 3 months  RESPIRATORY:  +exertional shortness of breath, no wheezing, + cough      PHYSICAL EXAM:  Vitals:    11/05/18 0811 11/05/18 0839 11/05/18 0848   BP: (!) 147/75 120/60    Site: Left Upper Arm     Position: Sitting     Cuff Size: Medium Adult     Pulse: 92     Resp: 20     Temp: 96.9 °F (36.1 °C)     TempSrc: Oral     SpO2: (!) 83% 91% 97%   Weight: 141 lb 3.2 oz (64 kg)     Height: 5' 3\" (1.6 m)     83% after walking to exam room on 4L NC; 91% on 2L at rest; 97% at rest on 4L NC    Gen: Well developed; well nourished  Eyes: No scleral icterus. No conjunctival injection. ENT:  Oropharynx clear. External appearance of ears and nose normal.  Neck: Trachea midline. No obvious mass. No visible thyroid enlargement    Respiratory: Clear to auscultation bilaterally, no accessory muscle use  Cardiovascular: Regular rate and rhythm, no appreciable murmurs. No edema. Gastrointestinal: Soft, non-tender. No hernia  Skin: Warm and dry. No rashes or ulcers on visible areas. Normal texture and turgor  Lymphatic: No cervical LAD. No supraclavicular LAD. Musculoskeletal: No cyanosis, clubbing or joint deformity.     Psychiatric: Normal mood and affect; exhibits normal insight and judgement     Pulmonary Function Testing (6/20/16)  FVC 2.87 L at 108% predicted ---> 2.92L at 110% predicted  FEV1 131 L at 66% predicted ----> 1.36L at 69%

## 2018-11-06 ENCOUNTER — HOSPITAL ENCOUNTER (OUTPATIENT)
Dept: CARDIAC REHAB | Age: 78
Setting detail: THERAPIES SERIES
Discharge: HOME | End: 2018-11-06
Payer: MEDICARE

## 2018-11-06 VITALS — BODY MASS INDEX: 25.35 KG/M2 | WEIGHT: 143.1 LBS

## 2018-11-06 PROCEDURE — G0424 PULMONARY REHAB W EXER: HCPCS

## 2018-11-08 ENCOUNTER — HOSPITAL ENCOUNTER (OUTPATIENT)
Dept: CARDIAC REHAB | Age: 78
Setting detail: THERAPIES SERIES
Discharge: HOME | End: 2018-11-08
Payer: MEDICARE

## 2018-11-08 VITALS — BODY MASS INDEX: 24.99 KG/M2 | WEIGHT: 141.1 LBS

## 2018-11-08 PROCEDURE — G0424 PULMONARY REHAB W EXER: HCPCS

## 2018-11-13 ENCOUNTER — HOSPITAL ENCOUNTER (OUTPATIENT)
Dept: CARDIAC REHAB | Age: 78
Setting detail: THERAPIES SERIES
Discharge: HOME | End: 2018-11-13
Payer: MEDICARE

## 2018-11-13 VITALS — BODY MASS INDEX: 24.91 KG/M2 | WEIGHT: 140.6 LBS

## 2018-11-13 PROCEDURE — G0424 PULMONARY REHAB W EXER: HCPCS

## 2018-11-15 ENCOUNTER — HOSPITAL ENCOUNTER (OUTPATIENT)
Dept: CARDIAC REHAB | Age: 78
Setting detail: THERAPIES SERIES
Discharge: HOME | End: 2018-11-15
Payer: MEDICARE

## 2018-11-15 VITALS — BODY MASS INDEX: 25.15 KG/M2 | WEIGHT: 142 LBS

## 2018-11-15 PROCEDURE — G0424 PULMONARY REHAB W EXER: HCPCS

## 2018-11-20 ENCOUNTER — HOSPITAL ENCOUNTER (OUTPATIENT)
Dept: CARDIAC REHAB | Age: 78
Setting detail: THERAPIES SERIES
Discharge: HOME | End: 2018-11-20
Payer: MEDICARE

## 2018-11-20 VITALS — BODY MASS INDEX: 24.91 KG/M2 | WEIGHT: 140.6 LBS

## 2018-11-20 PROCEDURE — G0424 PULMONARY REHAB W EXER: HCPCS

## 2018-11-27 ENCOUNTER — HOSPITAL ENCOUNTER (OUTPATIENT)
Dept: CARDIAC REHAB | Age: 78
Setting detail: THERAPIES SERIES
Discharge: HOME | End: 2018-11-27
Payer: MEDICARE

## 2018-11-27 VITALS — WEIGHT: 142 LBS | BODY MASS INDEX: 25.15 KG/M2

## 2018-11-27 PROCEDURE — G0424 PULMONARY REHAB W EXER: HCPCS

## 2018-11-29 ENCOUNTER — HOSPITAL ENCOUNTER (OUTPATIENT)
Dept: CARDIAC REHAB | Age: 78
Setting detail: THERAPIES SERIES
Discharge: HOME | End: 2018-11-29
Payer: MEDICARE

## 2018-11-29 VITALS — BODY MASS INDEX: 25.15 KG/M2 | WEIGHT: 142 LBS

## 2018-11-29 PROCEDURE — G0424 PULMONARY REHAB W EXER: HCPCS

## 2018-12-04 ENCOUNTER — HOSPITAL ENCOUNTER (OUTPATIENT)
Dept: CARDIAC REHAB | Age: 78
Setting detail: THERAPIES SERIES
Discharge: HOME | End: 2018-12-04
Payer: COMMERCIAL

## 2018-12-06 ENCOUNTER — TELEPHONE (OUTPATIENT)
Dept: PULMONOLOGY | Age: 78
End: 2018-12-06

## 2018-12-06 ENCOUNTER — HOSPITAL ENCOUNTER (OUTPATIENT)
Dept: CARDIAC REHAB | Age: 78
Setting detail: THERAPIES SERIES
Discharge: HOME | End: 2018-12-06
Payer: COMMERCIAL

## 2018-12-06 VITALS — WEIGHT: 142.7 LBS | BODY MASS INDEX: 25.28 KG/M2

## 2018-12-06 DIAGNOSIS — R05.9 COUGH: Primary | ICD-10-CM

## 2018-12-06 PROCEDURE — G0424 PULMONARY REHAB W EXER: HCPCS

## 2018-12-06 RX ORDER — CEFUROXIME AXETIL 500 MG/1
500 TABLET ORAL 2 TIMES DAILY
Qty: 20 TABLET | Refills: 0 | Status: SHIPPED | OUTPATIENT
Start: 2018-12-06 | End: 2018-12-16

## 2018-12-06 RX ORDER — PREDNISONE 20 MG/1
TABLET ORAL
Qty: 17 TABLET | Refills: 0 | Status: SHIPPED | OUTPATIENT
Start: 2018-12-06 | End: 2019-01-29

## 2018-12-06 NOTE — TELEPHONE ENCOUNTER
While rounding in the ICU I was notified that patient had been sent from pulmonary rehab for hypoxia during exercise. She was advised to go to the ER since I was unable to evaluate her, but she declined. I have sent prescriptions for a 2 week prednisone taper and 10 days of ceftin. I have also ordered a CXR. Please let patient know she should check her oxygen saturation at home. If her saturations are below 90% or she has increasing respiratory symptoms my recommendation remains for her to go to the ER.

## 2018-12-11 ENCOUNTER — HOSPITAL ENCOUNTER (OUTPATIENT)
Dept: GENERAL RADIOLOGY | Age: 78
Discharge: HOME | End: 2018-12-11
Payer: COMMERCIAL

## 2018-12-11 ENCOUNTER — HOSPITAL ENCOUNTER (OUTPATIENT)
Age: 78
Discharge: HOME | End: 2018-12-11
Payer: COMMERCIAL

## 2018-12-11 ENCOUNTER — HOSPITAL ENCOUNTER (OUTPATIENT)
Dept: CARDIAC REHAB | Age: 78
Setting detail: THERAPIES SERIES
Discharge: HOME | End: 2018-12-11
Payer: COMMERCIAL

## 2018-12-11 VITALS — WEIGHT: 142.4 LBS | BODY MASS INDEX: 25.23 KG/M2

## 2018-12-11 DIAGNOSIS — R05.9 COUGH: ICD-10-CM

## 2018-12-11 PROCEDURE — 71046 X-RAY EXAM CHEST 2 VIEWS: CPT

## 2018-12-11 PROCEDURE — G0424 PULMONARY REHAB W EXER: HCPCS

## 2018-12-18 ENCOUNTER — HOSPITAL ENCOUNTER (OUTPATIENT)
Dept: CARDIAC REHAB | Age: 78
Setting detail: THERAPIES SERIES
Discharge: HOME | End: 2018-12-18
Payer: COMMERCIAL

## 2018-12-18 VITALS — BODY MASS INDEX: 24.98 KG/M2 | WEIGHT: 141 LBS

## 2018-12-18 PROCEDURE — G0424 PULMONARY REHAB W EXER: HCPCS

## 2018-12-20 ENCOUNTER — HOSPITAL ENCOUNTER (OUTPATIENT)
Dept: CARDIAC REHAB | Age: 78
Setting detail: THERAPIES SERIES
Discharge: HOME | End: 2018-12-20
Payer: COMMERCIAL

## 2018-12-20 VITALS — BODY MASS INDEX: 25.51 KG/M2 | WEIGHT: 144 LBS

## 2018-12-20 PROCEDURE — G0424 PULMONARY REHAB W EXER: HCPCS

## 2018-12-27 ENCOUNTER — HOSPITAL ENCOUNTER (OUTPATIENT)
Dept: CARDIAC REHAB | Age: 78
Setting detail: THERAPIES SERIES
Discharge: HOME | End: 2018-12-27
Payer: COMMERCIAL

## 2018-12-27 VITALS — BODY MASS INDEX: 24.73 KG/M2 | WEIGHT: 139.6 LBS

## 2018-12-27 PROCEDURE — G0424 PULMONARY REHAB W EXER: HCPCS

## 2019-01-03 ENCOUNTER — HOSPITAL ENCOUNTER (OUTPATIENT)
Dept: CARDIAC REHAB | Age: 79
Setting detail: THERAPIES SERIES
Discharge: HOME | End: 2019-01-03
Payer: MEDICARE

## 2019-01-03 ENCOUNTER — TELEPHONE (OUTPATIENT)
Dept: PULMONOLOGY | Age: 79
End: 2019-01-03

## 2019-01-03 VITALS — WEIGHT: 140 LBS | BODY MASS INDEX: 24.8 KG/M2

## 2019-01-03 PROCEDURE — G0424 PULMONARY REHAB W EXER: HCPCS

## 2019-01-03 RX ORDER — PREDNISONE 20 MG/1
TABLET ORAL
Qty: 17 TABLET | Refills: 0 | Status: SHIPPED | OUTPATIENT
Start: 2019-01-03 | End: 2019-01-29

## 2019-01-08 ENCOUNTER — HOSPITAL ENCOUNTER (OUTPATIENT)
Dept: CARDIAC REHAB | Age: 79
Setting detail: THERAPIES SERIES
Discharge: HOME | End: 2019-01-08
Payer: MEDICARE

## 2019-01-08 VITALS — BODY MASS INDEX: 24.98 KG/M2 | WEIGHT: 141 LBS

## 2019-01-08 PROCEDURE — G0424 PULMONARY REHAB W EXER: HCPCS

## 2019-01-10 ENCOUNTER — HOSPITAL ENCOUNTER (OUTPATIENT)
Dept: CARDIAC REHAB | Age: 79
Setting detail: THERAPIES SERIES
Discharge: HOME | End: 2019-01-10
Payer: MEDICARE

## 2019-01-10 VITALS — BODY MASS INDEX: 24.8 KG/M2 | WEIGHT: 140 LBS

## 2019-01-10 PROCEDURE — 93798 PHYS/QHP OP CAR RHAB W/ECG: CPT

## 2019-01-11 ENCOUNTER — OFFICE VISIT (OUTPATIENT)
Dept: INTERNAL MEDICINE CLINIC | Age: 79
End: 2019-01-11
Payer: COMMERCIAL

## 2019-01-11 VITALS
BODY MASS INDEX: 24.8 KG/M2 | RESPIRATION RATE: 16 BRPM | HEIGHT: 63 IN | DIASTOLIC BLOOD PRESSURE: 72 MMHG | HEART RATE: 97 BPM | OXYGEN SATURATION: 91 % | WEIGHT: 140 LBS | SYSTOLIC BLOOD PRESSURE: 136 MMHG

## 2019-01-11 DIAGNOSIS — J44.9 COPD, SEVERE (HCC): Primary | ICD-10-CM

## 2019-01-11 DIAGNOSIS — J96.11 CHRONIC RESPIRATORY FAILURE WITH HYPOXIA (HCC): ICD-10-CM

## 2019-01-11 PROCEDURE — 99213 OFFICE O/P EST LOW 20 MIN: CPT | Performed by: INTERNAL MEDICINE

## 2019-01-11 ASSESSMENT — ENCOUNTER SYMPTOMS
COUGH: 1
GASTROINTESTINAL NEGATIVE: 1
WHEEZING: 1
SHORTNESS OF BREATH: 1
CHEST TIGHTNESS: 0

## 2019-01-15 ENCOUNTER — HOSPITAL ENCOUNTER (OUTPATIENT)
Dept: CARDIAC REHAB | Age: 79
Setting detail: THERAPIES SERIES
Discharge: HOME | End: 2019-01-15
Payer: MEDICARE

## 2019-01-15 VITALS — WEIGHT: 140.8 LBS | BODY MASS INDEX: 24.94 KG/M2

## 2019-01-15 PROCEDURE — G0424 PULMONARY REHAB W EXER: HCPCS

## 2019-01-17 ENCOUNTER — HOSPITAL ENCOUNTER (OUTPATIENT)
Dept: CARDIAC REHAB | Age: 79
Setting detail: THERAPIES SERIES
Discharge: HOME | End: 2019-01-17
Payer: MEDICARE

## 2019-01-17 VITALS — WEIGHT: 139.5 LBS | BODY MASS INDEX: 24.71 KG/M2

## 2019-01-17 PROCEDURE — G0424 PULMONARY REHAB W EXER: HCPCS

## 2019-01-22 ENCOUNTER — HOSPITAL ENCOUNTER (OUTPATIENT)
Dept: CARDIAC REHAB | Age: 79
Setting detail: THERAPIES SERIES
Discharge: HOME | End: 2019-01-22
Payer: MEDICARE

## 2019-01-22 VITALS — BODY MASS INDEX: 25.08 KG/M2 | WEIGHT: 141.6 LBS

## 2019-01-22 PROCEDURE — G0424 PULMONARY REHAB W EXER: HCPCS

## 2019-01-24 ENCOUNTER — HOSPITAL ENCOUNTER (OUTPATIENT)
Dept: CARDIAC REHAB | Age: 79
Setting detail: THERAPIES SERIES
Discharge: HOME | End: 2019-01-24
Payer: MEDICARE

## 2019-01-24 PROCEDURE — G0424 PULMONARY REHAB W EXER: HCPCS

## 2019-01-28 RX ORDER — TIOTROPIUM BROMIDE 18 UG/1
CAPSULE ORAL; RESPIRATORY (INHALATION)
Qty: 90 CAPSULE | Refills: 1 | Status: SHIPPED | OUTPATIENT
Start: 2019-01-28 | End: 2019-07-04 | Stop reason: SDUPTHER

## 2019-01-29 ENCOUNTER — HOSPITAL ENCOUNTER (OUTPATIENT)
Dept: CARDIAC REHAB | Age: 79
Setting detail: THERAPIES SERIES
Discharge: HOME | End: 2019-01-29
Payer: MEDICARE

## 2019-01-29 ENCOUNTER — OFFICE VISIT (OUTPATIENT)
Dept: PULMONOLOGY | Age: 79
End: 2019-01-29
Payer: MEDICARE

## 2019-01-29 VITALS
BODY MASS INDEX: 25.27 KG/M2 | DIASTOLIC BLOOD PRESSURE: 60 MMHG | HEART RATE: 94 BPM | TEMPERATURE: 97.4 F | SYSTOLIC BLOOD PRESSURE: 128 MMHG | RESPIRATION RATE: 20 BRPM | HEIGHT: 63 IN | OXYGEN SATURATION: 92 % | WEIGHT: 142.6 LBS

## 2019-01-29 VITALS — BODY MASS INDEX: 25.24 KG/M2 | WEIGHT: 142.5 LBS

## 2019-01-29 DIAGNOSIS — J43.2 CENTRILOBULAR EMPHYSEMA (HCC): Primary | ICD-10-CM

## 2019-01-29 DIAGNOSIS — J20.9 ACUTE BRONCHITIS, UNSPECIFIED ORGANISM: ICD-10-CM

## 2019-01-29 DIAGNOSIS — J96.11 CHRONIC RESPIRATORY FAILURE WITH HYPOXIA (HCC): ICD-10-CM

## 2019-01-29 PROCEDURE — 4040F PNEUMOC VAC/ADMIN/RCVD: CPT | Performed by: INTERNAL MEDICINE

## 2019-01-29 PROCEDURE — G8419 CALC BMI OUT NRM PARAM NOF/U: HCPCS | Performed by: INTERNAL MEDICINE

## 2019-01-29 PROCEDURE — G8399 PT W/DXA RESULTS DOCUMENT: HCPCS | Performed by: INTERNAL MEDICINE

## 2019-01-29 PROCEDURE — 1101F PT FALLS ASSESS-DOCD LE1/YR: CPT | Performed by: INTERNAL MEDICINE

## 2019-01-29 PROCEDURE — G8482 FLU IMMUNIZE ORDER/ADMIN: HCPCS | Performed by: INTERNAL MEDICINE

## 2019-01-29 PROCEDURE — 1090F PRES/ABSN URINE INCON ASSESS: CPT | Performed by: INTERNAL MEDICINE

## 2019-01-29 PROCEDURE — 1123F ACP DISCUSS/DSCN MKR DOCD: CPT | Performed by: INTERNAL MEDICINE

## 2019-01-29 PROCEDURE — G8427 DOCREV CUR MEDS BY ELIG CLIN: HCPCS | Performed by: INTERNAL MEDICINE

## 2019-01-29 PROCEDURE — 1036F TOBACCO NON-USER: CPT | Performed by: INTERNAL MEDICINE

## 2019-01-29 PROCEDURE — 3023F SPIROM DOC REV: CPT | Performed by: INTERNAL MEDICINE

## 2019-01-29 PROCEDURE — 99214 OFFICE O/P EST MOD 30 MIN: CPT | Performed by: INTERNAL MEDICINE

## 2019-01-29 PROCEDURE — G0424 PULMONARY REHAB W EXER: HCPCS

## 2019-01-29 PROCEDURE — G8926 SPIRO NO PERF OR DOC: HCPCS | Performed by: INTERNAL MEDICINE

## 2019-01-29 RX ORDER — PREDNISONE 20 MG/1
TABLET ORAL
Qty: 17 TABLET | Refills: 0 | Status: SHIPPED | OUTPATIENT
Start: 2019-01-29 | End: 2019-08-02

## 2019-01-29 RX ORDER — PREDNISONE 10 MG/1
10 TABLET ORAL DAILY
Qty: 30 TABLET | Refills: 3 | Status: SHIPPED | OUTPATIENT
Start: 2019-01-29 | End: 2019-02-28

## 2019-01-29 RX ORDER — CEFDINIR 300 MG/1
300 CAPSULE ORAL 2 TIMES DAILY
Qty: 20 CAPSULE | Refills: 0 | Status: SHIPPED | OUTPATIENT
Start: 2019-01-29 | End: 2019-02-08

## 2019-01-31 ENCOUNTER — HOSPITAL ENCOUNTER (OUTPATIENT)
Dept: CARDIAC REHAB | Age: 79
Setting detail: THERAPIES SERIES
Discharge: HOME | End: 2019-01-31
Payer: MEDICARE

## 2019-01-31 VITALS — BODY MASS INDEX: 25.4 KG/M2 | WEIGHT: 143.4 LBS

## 2019-01-31 PROCEDURE — G0424 PULMONARY REHAB W EXER: HCPCS

## 2019-02-05 ENCOUNTER — HOSPITAL ENCOUNTER (OUTPATIENT)
Dept: CARDIAC REHAB | Age: 79
Setting detail: THERAPIES SERIES
Discharge: HOME | End: 2019-02-05
Payer: MEDICARE

## 2019-02-07 ENCOUNTER — HOSPITAL ENCOUNTER (OUTPATIENT)
Dept: CARDIAC REHAB | Age: 79
Setting detail: THERAPIES SERIES
Discharge: HOME | End: 2019-02-07
Payer: MEDICARE

## 2019-02-07 VITALS — BODY MASS INDEX: 25.23 KG/M2 | WEIGHT: 142.4 LBS

## 2019-02-07 PROCEDURE — G0424 PULMONARY REHAB W EXER: HCPCS

## 2019-02-08 ENCOUNTER — HOSPITAL ENCOUNTER (OUTPATIENT)
Dept: PULMONOLOGY | Age: 79
Discharge: HOME | End: 2019-02-08
Payer: MEDICARE

## 2019-02-08 DIAGNOSIS — J43.2 CENTRILOBULAR EMPHYSEMA (HCC): ICD-10-CM

## 2019-02-08 PROCEDURE — 94726 PLETHYSMOGRAPHY LUNG VOLUMES: CPT

## 2019-02-08 PROCEDURE — 94729 DIFFUSING CAPACITY: CPT

## 2019-02-08 PROCEDURE — 94640 AIRWAY INHALATION TREATMENT: CPT

## 2019-02-08 PROCEDURE — 94664 DEMO&/EVAL PT USE INHALER: CPT

## 2019-02-08 PROCEDURE — 94060 EVALUATION OF WHEEZING: CPT

## 2019-02-08 PROCEDURE — 6370000000 HC RX 637 (ALT 250 FOR IP): Performed by: INTERNAL MEDICINE

## 2019-02-08 RX ORDER — ALBUTEROL SULFATE 90 UG/1
4 AEROSOL, METERED RESPIRATORY (INHALATION) ONCE
Status: COMPLETED | OUTPATIENT
Start: 2019-02-08 | End: 2019-02-08

## 2019-02-08 RX ADMIN — ALBUTEROL SULFATE 4 PUFF: 90 AEROSOL, METERED RESPIRATORY (INHALATION) at 08:46

## 2019-02-12 ENCOUNTER — HOSPITAL ENCOUNTER (OUTPATIENT)
Dept: CARDIAC REHAB | Age: 79
Setting detail: THERAPIES SERIES
Discharge: HOME | End: 2019-02-12
Payer: MEDICARE

## 2019-02-12 VITALS — BODY MASS INDEX: 24.98 KG/M2 | WEIGHT: 141 LBS

## 2019-02-12 PROCEDURE — G0424 PULMONARY REHAB W EXER: HCPCS

## 2019-02-14 ENCOUNTER — HOSPITAL ENCOUNTER (OUTPATIENT)
Dept: CARDIAC REHAB | Age: 79
Setting detail: THERAPIES SERIES
Discharge: HOME | End: 2019-02-14
Payer: MEDICARE

## 2019-02-14 VITALS — WEIGHT: 142 LBS | BODY MASS INDEX: 25.15 KG/M2

## 2019-02-14 PROCEDURE — G0424 PULMONARY REHAB W EXER: HCPCS

## 2019-02-19 ENCOUNTER — HOSPITAL ENCOUNTER (OUTPATIENT)
Dept: CARDIAC REHAB | Age: 79
Setting detail: THERAPIES SERIES
Discharge: HOME | End: 2019-02-19
Payer: MEDICARE

## 2019-02-19 VITALS — WEIGHT: 142.6 LBS | BODY MASS INDEX: 25.26 KG/M2

## 2019-02-19 PROCEDURE — G0424 PULMONARY REHAB W EXER: HCPCS

## 2019-02-19 PROCEDURE — 94618 PULMONARY STRESS TESTING: CPT | Performed by: INTERNAL MEDICINE

## 2019-02-20 RX ORDER — LEVOTHYROXINE SODIUM 0.1 MG/1
TABLET ORAL
Qty: 90 TABLET | Refills: 1 | Status: SHIPPED | OUTPATIENT
Start: 2019-02-20 | End: 2019-07-09 | Stop reason: SDUPTHER

## 2019-02-21 ENCOUNTER — HOSPITAL ENCOUNTER (OUTPATIENT)
Dept: CARDIAC REHAB | Age: 79
Setting detail: THERAPIES SERIES
End: 2019-02-21
Payer: MEDICARE

## 2019-02-28 RX ORDER — OMEPRAZOLE 40 MG/1
CAPSULE, DELAYED RELEASE ORAL
Qty: 90 CAPSULE | Refills: 1 | Status: SHIPPED | OUTPATIENT
Start: 2019-02-28 | End: 2020-03-25

## 2019-03-05 ENCOUNTER — TELEPHONE (OUTPATIENT)
Dept: PULMONOLOGY | Age: 79
End: 2019-03-05

## 2019-03-05 RX ORDER — DOXYCYCLINE HYCLATE 100 MG
100 TABLET ORAL 2 TIMES DAILY
Qty: 14 TABLET | Refills: 0 | Status: SHIPPED | OUTPATIENT
Start: 2019-03-05 | End: 2019-03-12

## 2019-03-05 RX ORDER — PREDNISONE 20 MG/1
40 TABLET ORAL DAILY
Qty: 10 TABLET | Refills: 0 | Status: SHIPPED | OUTPATIENT
Start: 2019-03-05 | End: 2019-03-10

## 2019-03-12 ENCOUNTER — TELEPHONE (OUTPATIENT)
Dept: PULMONOLOGY | Age: 79
End: 2019-03-12

## 2019-03-12 DIAGNOSIS — R05.9 COUGH: Primary | ICD-10-CM

## 2019-03-12 RX ORDER — AMOXICILLIN AND CLAVULANATE POTASSIUM 875; 125 MG/1; MG/1
1 TABLET, FILM COATED ORAL 2 TIMES DAILY
Qty: 20 TABLET | Refills: 0 | Status: SHIPPED | OUTPATIENT
Start: 2019-03-12 | End: 2019-03-22

## 2019-03-12 RX ORDER — PREDNISONE 10 MG/1
10 TABLET ORAL DAILY
Qty: 90 TABLET | Refills: 1 | Status: SHIPPED | OUTPATIENT
Start: 2019-03-12 | End: 2019-06-10

## 2019-03-13 ENCOUNTER — HOSPITAL ENCOUNTER (OUTPATIENT)
Dept: GENERAL RADIOLOGY | Age: 79
Discharge: HOME | End: 2019-03-13
Payer: MEDICARE

## 2019-03-13 ENCOUNTER — HOSPITAL ENCOUNTER (OUTPATIENT)
Age: 79
Discharge: HOME | End: 2019-03-13
Payer: MEDICARE

## 2019-03-13 DIAGNOSIS — R05.9 COUGH: ICD-10-CM

## 2019-03-13 PROCEDURE — 71046 X-RAY EXAM CHEST 2 VIEWS: CPT

## 2019-03-27 ENCOUNTER — TELEPHONE (OUTPATIENT)
Dept: PULMONOLOGY | Age: 79
End: 2019-03-27

## 2019-03-27 DIAGNOSIS — R05.9 COUGH: Primary | ICD-10-CM

## 2019-03-27 RX ORDER — BROMPHENIRAMINE MALEATE, PSEUDOEPHEDRINE HYDROCHLORIDE, AND DEXTROMETHORPHAN HYDROBROMIDE 2; 30; 10 MG/5ML; MG/5ML; MG/5ML
5 SYRUP ORAL 4 TIMES DAILY PRN
Qty: 118 ML | Refills: 0 | Status: SHIPPED | OUTPATIENT
Start: 2019-03-27 | End: 2019-08-02 | Stop reason: ALTCHOICE

## 2019-03-27 RX ORDER — CEFUROXIME AXETIL 500 MG/1
500 TABLET ORAL 2 TIMES DAILY
Qty: 14 TABLET | Refills: 0 | Status: SHIPPED | OUTPATIENT
Start: 2019-03-27 | End: 2019-04-03

## 2019-03-30 DIAGNOSIS — R05.9 COUGH: ICD-10-CM

## 2019-04-01 LAB
CULTURE, RESPIRATORY: ABNORMAL
CULTURE, RESPIRATORY: ABNORMAL
GRAM STAIN RESULT: ABNORMAL
ORGANISM: ABNORMAL

## 2019-04-01 RX ORDER — CIPROFLOXACIN 500 MG/1
500 TABLET, FILM COATED ORAL 2 TIMES DAILY
Qty: 20 TABLET | Refills: 0 | Status: SHIPPED | OUTPATIENT
Start: 2019-04-01 | End: 2019-04-11

## 2019-04-26 ENCOUNTER — OFFICE VISIT (OUTPATIENT)
Dept: INTERNAL MEDICINE CLINIC | Age: 79
End: 2019-04-26
Payer: MEDICARE

## 2019-04-26 VITALS
SYSTOLIC BLOOD PRESSURE: 122 MMHG | OXYGEN SATURATION: 93 % | WEIGHT: 143 LBS | TEMPERATURE: 98.7 F | BODY MASS INDEX: 25.33 KG/M2 | HEART RATE: 91 BPM | DIASTOLIC BLOOD PRESSURE: 64 MMHG

## 2019-04-26 DIAGNOSIS — E03.9 HYPOTHYROIDISM, UNSPECIFIED TYPE: ICD-10-CM

## 2019-04-26 DIAGNOSIS — J96.11 CHRONIC RESPIRATORY FAILURE WITH HYPOXIA (HCC): ICD-10-CM

## 2019-04-26 DIAGNOSIS — E78.00 PURE HYPERCHOLESTEROLEMIA: ICD-10-CM

## 2019-04-26 DIAGNOSIS — R73.9 HYPERGLYCEMIA: ICD-10-CM

## 2019-04-26 DIAGNOSIS — J44.9 COPD, SEVERE (HCC): Primary | ICD-10-CM

## 2019-04-26 LAB
A/G RATIO: 1.6 (ref 1.1–2.2)
ALBUMIN SERPL-MCNC: 4.2 G/DL (ref 3.4–5)
ALP BLD-CCNC: 51 U/L (ref 40–129)
ALT SERPL-CCNC: 23 U/L (ref 10–40)
ANION GAP SERPL CALCULATED.3IONS-SCNC: 14 MMOL/L (ref 3–16)
AST SERPL-CCNC: 23 U/L (ref 15–37)
BASOPHILS ABSOLUTE: 0.2 K/UL (ref 0–0.2)
BASOPHILS RELATIVE PERCENT: 1.1 %
BILIRUB SERPL-MCNC: <0.2 MG/DL (ref 0–1)
BUN BLDV-MCNC: 17 MG/DL (ref 7–20)
CALCIUM SERPL-MCNC: 10.4 MG/DL (ref 8.3–10.6)
CHLORIDE BLD-SCNC: 102 MMOL/L (ref 99–110)
CHOLESTEROL, TOTAL: 188 MG/DL (ref 0–199)
CO2: 26 MMOL/L (ref 21–32)
CREAT SERPL-MCNC: 0.7 MG/DL (ref 0.6–1.2)
EOSINOPHILS ABSOLUTE: 1.1 K/UL (ref 0–0.6)
EOSINOPHILS RELATIVE PERCENT: 7.8 %
GFR AFRICAN AMERICAN: >60
GFR NON-AFRICAN AMERICAN: >60
GLOBULIN: 2.7 G/DL
GLUCOSE BLD-MCNC: 139 MG/DL (ref 70–99)
HCT VFR BLD CALC: 40.4 % (ref 36–48)
HDLC SERPL-MCNC: 78 MG/DL (ref 40–60)
HEMOGLOBIN: 12.8 G/DL (ref 12–16)
LDL CHOLESTEROL CALCULATED: ABNORMAL MG/DL
LDL CHOLESTEROL DIRECT: 43 MG/DL
LYMPHOCYTES ABSOLUTE: 1.5 K/UL (ref 1–5.1)
LYMPHOCYTES RELATIVE PERCENT: 10.1 %
MCH RBC QN AUTO: 28.3 PG (ref 26–34)
MCHC RBC AUTO-ENTMCNC: 31.8 G/DL (ref 31–36)
MCV RBC AUTO: 89.1 FL (ref 80–100)
MONOCYTES ABSOLUTE: 0.6 K/UL (ref 0–1.3)
MONOCYTES RELATIVE PERCENT: 4.1 %
NEUTROPHILS ABSOLUTE: 11.2 K/UL (ref 1.7–7.7)
NEUTROPHILS RELATIVE PERCENT: 76.9 %
PDW BLD-RTO: 16.7 % (ref 12.4–15.4)
PLATELET # BLD: 308 K/UL (ref 135–450)
PMV BLD AUTO: 10.1 FL (ref 5–10.5)
POTASSIUM SERPL-SCNC: 5.2 MMOL/L (ref 3.5–5.1)
RBC # BLD: 4.53 M/UL (ref 4–5.2)
SODIUM BLD-SCNC: 142 MMOL/L (ref 136–145)
T4 FREE: 1.5 NG/DL (ref 0.9–1.8)
TOTAL PROTEIN: 6.9 G/DL (ref 6.4–8.2)
TRIGL SERPL-MCNC: 402 MG/DL (ref 0–150)
TSH REFLEX: 0.75 UIU/ML (ref 0.27–4.2)
VLDLC SERPL CALC-MCNC: ABNORMAL MG/DL
WBC # BLD: 14.6 K/UL (ref 4–11)

## 2019-04-26 PROCEDURE — 3023F SPIROM DOC REV: CPT | Performed by: INTERNAL MEDICINE

## 2019-04-26 PROCEDURE — 1090F PRES/ABSN URINE INCON ASSESS: CPT | Performed by: INTERNAL MEDICINE

## 2019-04-26 PROCEDURE — 36415 COLL VENOUS BLD VENIPUNCTURE: CPT | Performed by: INTERNAL MEDICINE

## 2019-04-26 PROCEDURE — 1123F ACP DISCUSS/DSCN MKR DOCD: CPT | Performed by: INTERNAL MEDICINE

## 2019-04-26 PROCEDURE — 4040F PNEUMOC VAC/ADMIN/RCVD: CPT | Performed by: INTERNAL MEDICINE

## 2019-04-26 PROCEDURE — 99214 OFFICE O/P EST MOD 30 MIN: CPT | Performed by: INTERNAL MEDICINE

## 2019-04-26 PROCEDURE — G8399 PT W/DXA RESULTS DOCUMENT: HCPCS | Performed by: INTERNAL MEDICINE

## 2019-04-26 PROCEDURE — 1036F TOBACCO NON-USER: CPT | Performed by: INTERNAL MEDICINE

## 2019-04-26 PROCEDURE — G8427 DOCREV CUR MEDS BY ELIG CLIN: HCPCS | Performed by: INTERNAL MEDICINE

## 2019-04-26 PROCEDURE — G8926 SPIRO NO PERF OR DOC: HCPCS | Performed by: INTERNAL MEDICINE

## 2019-04-26 PROCEDURE — G8419 CALC BMI OUT NRM PARAM NOF/U: HCPCS | Performed by: INTERNAL MEDICINE

## 2019-04-26 ASSESSMENT — ENCOUNTER SYMPTOMS
CHEST TIGHTNESS: 0
COUGH: 1
SHORTNESS OF BREATH: 1
WHEEZING: 1
GASTROINTESTINAL NEGATIVE: 1

## 2019-04-26 ASSESSMENT — PATIENT HEALTH QUESTIONNAIRE - PHQ9
SUM OF ALL RESPONSES TO PHQ QUESTIONS 1-9: 1
SUM OF ALL RESPONSES TO PHQ9 QUESTIONS 1 & 2: 1
2. FEELING DOWN, DEPRESSED OR HOPELESS: 0
SUM OF ALL RESPONSES TO PHQ QUESTIONS 1-9: 1
1. LITTLE INTEREST OR PLEASURE IN DOING THINGS: 1

## 2019-04-26 NOTE — PROGRESS NOTES
Subjective:      Patient ID: Wes Collins is a 77 y.o. female. HPI  Has coughing spells some times and wheezes some times even with out exertion and not need to use inhaler then  Has no symptoms at rest and when sleeping  Is able to do her house chores and shopping with oxygen and she heeds to rest few times  Her inhaler has been changed to breo for 6 months and since then by evening she is having problems with breathing and using advair then and her symptoms gets better  Review of Systems   Constitutional: Negative for activity change, appetite change and unexpected weight change. Respiratory: Positive for cough, shortness of breath and wheezing. Negative for chest tightness. Cardiovascular: Negative for chest pain, palpitations and leg swelling. Gastrointestinal: Negative. Genitourinary: Negative. Neurological: Negative for dizziness, light-headedness and headaches. Objective:   Physical Exam   Constitutional: She is oriented to person, place, and time. No distress. Eyes: Pupils are equal, round, and reactive to light. Conjunctivae and EOM are normal. No scleral icterus. Neck: No JVD present. No thyromegaly present. Cardiovascular: Normal rate, regular rhythm, normal heart sounds and intact distal pulses. Exam reveals no gallop. No murmur heard. Pulmonary/Chest: No respiratory distress. She has no wheezes (has decaresed bs both lungs). She has no rales. Musculoskeletal: She exhibits no edema. Lymphadenopathy:     She has no cervical adenopathy. Neurological: She is alert and oriented to person, place, and time. Nursing note and vitals reviewed.       Assessment:      Copd stable; chronic resp failure with hypoxia and is on home oxygen    Hypothyroid  stable  Plan:      Change  breo to advair as breo is not helping her symptoms through out the day    Continue current medications  See in 3 months    Lissett Yun MD

## 2019-04-26 NOTE — PATIENT INSTRUCTIONS
Change  breo to advair as breo is not helping her symptoms through out the day    Continue current medications  See in 3 months

## 2019-04-27 LAB
ESTIMATED AVERAGE GLUCOSE: 157.1 MG/DL
HBA1C MFR BLD: 7.1 %

## 2019-04-29 RX ORDER — METFORMIN HYDROCHLORIDE 500 MG/1
500 TABLET, EXTENDED RELEASE ORAL
Qty: 30 TABLET | Refills: 3 | Status: SHIPPED | OUTPATIENT
Start: 2019-04-29 | End: 2019-07-03 | Stop reason: SDUPTHER

## 2019-05-01 RX ORDER — ATORVASTATIN CALCIUM 20 MG/1
TABLET, FILM COATED ORAL
Qty: 90 TABLET | Refills: 1 | Status: SHIPPED | OUTPATIENT
Start: 2019-05-01 | End: 2019-09-18 | Stop reason: SDUPTHER

## 2019-05-07 ENCOUNTER — TELEPHONE (OUTPATIENT)
Dept: PULMONOLOGY | Age: 79
End: 2019-05-07

## 2019-05-07 ENCOUNTER — OFFICE VISIT (OUTPATIENT)
Dept: PULMONOLOGY | Age: 79
End: 2019-05-07
Payer: MEDICARE

## 2019-05-07 VITALS
OXYGEN SATURATION: 92 % | RESPIRATION RATE: 20 BRPM | WEIGHT: 143.2 LBS | BODY MASS INDEX: 25.37 KG/M2 | SYSTOLIC BLOOD PRESSURE: 124 MMHG | DIASTOLIC BLOOD PRESSURE: 60 MMHG | HEIGHT: 63 IN | HEART RATE: 94 BPM | TEMPERATURE: 96.9 F

## 2019-05-07 DIAGNOSIS — J96.11 CHRONIC RESPIRATORY FAILURE WITH HYPOXIA (HCC): ICD-10-CM

## 2019-05-07 DIAGNOSIS — J43.2 CENTRILOBULAR EMPHYSEMA (HCC): Primary | ICD-10-CM

## 2019-05-07 PROCEDURE — 99214 OFFICE O/P EST MOD 30 MIN: CPT | Performed by: INTERNAL MEDICINE

## 2019-05-07 PROCEDURE — G8926 SPIRO NO PERF OR DOC: HCPCS | Performed by: INTERNAL MEDICINE

## 2019-05-07 PROCEDURE — 4040F PNEUMOC VAC/ADMIN/RCVD: CPT | Performed by: INTERNAL MEDICINE

## 2019-05-07 PROCEDURE — 1123F ACP DISCUSS/DSCN MKR DOCD: CPT | Performed by: INTERNAL MEDICINE

## 2019-05-07 PROCEDURE — 1036F TOBACCO NON-USER: CPT | Performed by: INTERNAL MEDICINE

## 2019-05-07 PROCEDURE — G8419 CALC BMI OUT NRM PARAM NOF/U: HCPCS | Performed by: INTERNAL MEDICINE

## 2019-05-07 PROCEDURE — G8399 PT W/DXA RESULTS DOCUMENT: HCPCS | Performed by: INTERNAL MEDICINE

## 2019-05-07 PROCEDURE — G8427 DOCREV CUR MEDS BY ELIG CLIN: HCPCS | Performed by: INTERNAL MEDICINE

## 2019-05-07 PROCEDURE — 1090F PRES/ABSN URINE INCON ASSESS: CPT | Performed by: INTERNAL MEDICINE

## 2019-05-07 PROCEDURE — 3023F SPIROM DOC REV: CPT | Performed by: INTERNAL MEDICINE

## 2019-05-07 RX ORDER — IPRATROPIUM BROMIDE AND ALBUTEROL SULFATE 2.5; .5 MG/3ML; MG/3ML
1 SOLUTION RESPIRATORY (INHALATION) EVERY 4 HOURS
Qty: 540 ML | Refills: 3 | Status: SHIPPED | OUTPATIENT
Start: 2019-05-07 | End: 2020-07-07

## 2019-05-07 RX ORDER — SODIUM CHLORIDE FOR INHALATION 3 %
15 VIAL, NEBULIZER (ML) INHALATION PRN
Qty: 180 ML | Refills: 3 | Status: SHIPPED | OUTPATIENT
Start: 2019-05-07 | End: 2019-10-18

## 2019-05-07 RX ORDER — SODIUM CHLORIDE FOR INHALATION 3 %
15 VIAL, NEBULIZER (ML) INHALATION DAILY
Qty: 450 ML | Refills: 2 | Status: SHIPPED | OUTPATIENT
Start: 2019-05-07 | End: 2019-08-02

## 2019-05-07 NOTE — PATIENT INSTRUCTIONS
Please use the albuterol/ipratropium nebulizer twice daily  Use 3% saline as needed for cough     Please come for a follow up in 3 months

## 2019-05-07 NOTE — TELEPHONE ENCOUNTER
Please call pharmacy to clarify frequency for patients NaCl 3% neb solution. PRN for cough isn't acceptable.        [address] [facility] [address]  [address] - [address] [phone]  Associate Signed OrdersProvidersCurrent Interactions

## 2019-05-07 NOTE — TELEPHONE ENCOUNTER
Pharmacy called back. Each vial is 4 ml. Does provider want patient to use 3 daily for 12 ml or 4 daily for 16 ml? Please advise.  Directions say 15ml

## 2019-05-07 NOTE — PROGRESS NOTES
Chief complaint  This is a 77y.o. year old female  who presents with a chief complaint of   Chief Complaint   Patient presents with    Follow-up     Emphysema       HPI  69-year-old woman with moderate COPD (FEV1 1.36 L, 61% predicted), chronic hypoxic respiratory failure and Mycobacterium avium complex infection (completed 18 months of therapy) presents for follow-up. She feels her breathing is worsening. She is short of breath with any activity. She is still able to do her household chores and go grocery shopping, but she says it takes her a longer time to complete these tasks. She has been using Breo in the morning and Advair in the evenings because she feels Silver Flack does not last long enough. She uses her nebulizer 1-2 times a day. She is on prednisone 10 mg daily. She has been using 2-3 L of oxygen when she is at rest and 6-8 L with exertion with her home concentrator. She says her oxygen saturation is in the high 80s after activity, but increases to the low 90s with rest. She has completed pulmonary rehabilitation. Past Medical History:   Diagnosis Date    COPD (chronic obstructive pulmonary disease) ([address]     GERD (gastroesophageal reflux disease)     High cholesterol     Osteoporosis        Past Surgical History:   Procedure Laterality Date     SECTION      4 times    COLONOSCOPY  .     redundant colon       Current Outpatient Medications   Medication Sig Dispense Refill    ipratropium-albuterol (DUONEB) 0.5-2.5 (3) MG/3ML SOLN nebulizer solution Inhale 3 mLs into the lungs every 4 hours 540 mL 3    sodium chloride, Inhalant, (NEBUSAL) 3 % nebulizer solution Take 15 mLs by nebulization as needed for Cough (Cough) 180 mL 3    atorvastatin (LIPITOR) 20 MG tablet TAKE 1 TABLET EVERY DAY 90 tablet 1    metFORMIN (GLUCOPHAGE-XR) 500 MG extended release tablet Take 1 tablet by mouth daily (with breakfast) 30 tablet 3    fluticasone-salmeterol (ADVAIR DISKUS) 500-50 MCG/DOSE diskus  Marital status:      Spouse name: Not on file    Number of children: Not on file    Years of education: Not on file    Highest education level: Not on file   Occupational History    Not on file   Social Needs    Financial resource strain: Not on file    Food insecurity:     Worry: Not on file     Inability: Not on file    Transportation needs:     Medical: Not on file     Non-medical: Not on file   Tobacco Use    Smoking status: Former Smoker     Packs/day: 1.50     Years: 50.00     Pack years: 75.00     Types: Cigarettes     Start date: 1951     Last attempt to quit: 1999     Years since quittin.6    Smokeless tobacco: Never Used    Tobacco comment: quit 10 years ago   Substance and Sexual Activity    Alcohol use: No     Alcohol/week: 0.0 oz    Drug use: No    Sexual activity: Not on file   Lifestyle    Physical activity:     Days per week: Not on file     Minutes per session: Not on file    Stress: Not on file   Relationships    Social connections:     Talks on phone: Not on file     Gets together: Not on file     Attends Adventism service: Not on file     Active member of club or organization: Not on file     Attends meetings of clubs or organizations: Not on file     Relationship status: Not on file    Intimate partner violence:     Fear of current or ex partner: Not on file     Emotionally abused: Not on file     Physically abused: Not on file     Forced sexual activity: Not on file   Other Topics Concern    Not on file   Social History Narrative    Not on file       Immunization History   Administered Date(s) Administered    Influenza Nasal 2014    Influenza Vaccine, unspecified formulation 11/15/2016    Influenza Virus Vaccine 2014    Influenza Whole 10/01/2008    Influenza, High Dose (Fluzone 65 yrs and older) 10/12/2015, 11/15/2016, 10/11/2018    Pneumococcal 13-valent Conjugate ([id number]) 04/15/2016    Pneumococcal Conjugate 7-valent 11/01/2005, 10/11/2010    Pneumococcal Polysaccharide ([id number]) 02/26/2017       ROS:  GENERAL:  No fevers, no chills, no weight gain or loss  RESPIRATORY:  +exertional shortness of breath, no wheezing, + cough      PHYSICAL EXAM:  Vitals:    05/07/19 0844 05/07/19 0921   BP: (!) 131/55 124/60   Site: Right Upper Arm    Position: Sitting    Cuff Size: Medium Adult    Pulse: 94    Resp: 20    Temp: 96.9 °F (36.1 °C)    TempSrc: Oral    SpO2: 92%    Weight: 143 lb 3.2 oz (65 kg)    Height: 5' 3\" (1.6 m)    on 3L NC at rest    Gen: Well developed; well nourished  Eyes: No scleral icterus. No conjunctival injection. ENT:  Oropharynx clear. External appearance of ears and nose normal.  Neck: Trachea midline. No obvious mass. No visible thyroid enlargement    Respiratory: Diffusely decreased breath sounds, no accessory muscle use  Cardiovascular: Regular rate and rhythm, no appreciable murmurs. No edema. Gastrointestinal: Soft, non-tender. No hernia  Skin: Warm and dry. No rashes or ulcers on visible areas. Normal texture and turgor  Lymphatic: No cervical LAD. No supraclavicular LAD. Musculoskeletal: No cyanosis, clubbing or joint deformity. Psychiatric: Normal mood and affect; exhibits normal insight and judgement     Data reviewed:  Pulmonary Function Testing (2/8/19)  FVC 2.26 L at 93% predicted ---> 2.28L at 94% predicted  FEV1 1.05 L at 56% predicted -----> 1.14L at 61% predicted  FEV1/FVC ratio at 46%----> 50%  TLC 4.64 L at 95% predicted  VC 2.41L at 99% predicted  ERV 0.86L at 106% predicted  RV/TLC at 48% predicted  DLCO 4.37 at 19% predicted  DLCO/VA 1.19L at 25 % predicted    Overall (my interpretation):  Moderate lower airflow obstruction without significant reversal; lung volumes are normal; diffusing capacity is severely reduced and does not normalize when averaged over lung volumes (compared to the study in June 2016 flow measurements and vital capacity have significantly declined, other findings have not significantly changed)    Pulmonary Function Testing (6/20/16)  FVC 2.87 L at 108% predicted ---> 2.92L at 110% predicted  FEV1 131 L at 66% predicted ----> 1.36L at 69% predicted  FEV1/FVC ratio at 46% ----> 47% predicted  TLC 4.55 L at 92% predicted  VC 2.87L at 109% predicted  ERV 1.27L at 146% predicted  RV/TLC at 37% predicted  DLCO 4.3 at 22% predicted  DLCO/VA 1.33L at 38 % predicted      Overall: Moderate lower airflow obstruction without significant reversal; normal lung volumes; severe reduction in diffusing capacity that does not normalize when averaged over lung volumes (compared to the study in 2013 diffusing capacity has declined, otherwise there has been no significant change)          Six minute walk test:  10/23/18- Walked 244m, 61% predicted (reduced); iwona saturation 86% on RA---> 90% on 8L NC  2/19/19- Walked 244m, 62% predicted (reduced); 90% on 8L NC      Images and reports of chest imaging were reviewed by me. My interpretation is:  CXR (3/13/19): Hyperinflated chest; clear lung fields  Chest CT (4/26/18): Prominent mediastinal nodes; centrilobular emphysema; nodular density in the minor fissure (unchanged compared to 4/2017); amorphous opacity in the left upper lobe (increased compared to 4/2107); RLL nodule (unchanged compared to 4/2017); nodular density in the LLL (stable compared to 4/2017)  Chest CT (7/25/18): No LAD; centrilobular emphysema; nodular density in the right minor fissure and right lower lobe (unchanged compared to April 2017); left lower lobe nodule (unchanged compared to April 2017); left apical nodular density has resolved compared to April 2018      ECHO (8/17/18)  Summary   Normal LV size and systolic function: EF is    60%.    The left atrium is normal in size.   Right ventricular systolic function is normal.   Pericardial fat pad vs Trivial pericardial effusion    Lab Results   Component Value Date    WBC 14.6 (H) 04/26/2019    HGB 12.8 04/26/2019 HCT 40.4 04/26/2019    MCV 89.1 04/26/2019     04/26/2019       No results found for: BNP    Lab Results   Component Value Date    CREATININE 0.7 04/26/2019    BUN 17 04/26/2019     04/26/2019    K 5.2 (H) 04/26/2019     04/26/2019    CO2 26 04/26/2019         Assessment/Plan:77y.o. year old female presents for follow up. COPD- She has moderate lower airflow obstruction on PFTs. I have asked her to use Advair twice daily and discontinue Breo. Continue Spiriva daily. Will change albuterol nebulizer to Duonebs twice daily and begin 3% saline nebulizer as needed. Continue prednisone 10 mg daily along with omeprazole, calcium and vitamin D. She has completed pulmonary rehabilitation. Chronic hypoxic respiratory failure - I have asked her to use 3-4 L of oxygen at rest and 6-8 L with exertion. She is to return for follow-up in 3 months.       Madeline Jasmine MD  [facility] Pulmonology, Critical Care and Sleep

## 2019-05-17 ENCOUNTER — TELEPHONE (OUTPATIENT)
Dept: INTERNAL MEDICINE CLINIC | Age: 79
End: 2019-05-17

## 2019-05-21 NOTE — TELEPHONE ENCOUNTER
Was this being done electronically because there isn't any Rx's on med list or is it being written on paper & faxed in? If it has been done, please document so & close encounter.         Shea Asif  Patient Services Specialist  [phone]

## 2019-05-21 NOTE — TELEPHONE ENCOUNTER
Script for all she requested given to patient-100 x 3 refills for strips and lancets-check sugars daily

## 2019-06-12 RX ORDER — MONTELUKAST SODIUM 10 MG/1
TABLET ORAL
Qty: 90 TABLET | Refills: 3 | Status: SHIPPED | OUTPATIENT
Start: 2019-06-12 | End: 2020-09-09

## 2019-06-12 NOTE — TELEPHONE ENCOUNTER
Medication:   Requested Prescriptions     Pending Prescriptions Disp Refills    montelukast (SINGULAIR) 10 MG tablet [Pharmacy Med Name: MONTELUKAST SODIUM 10 MG Tablet] 90 tablet 3     Sig: TAKE 1 TABLET NIGHTLY       Last Filled:  8/15/2018    Patient Phone Number: [phone] (home)     Last appt: 10/5/2018  Next appt: 8/2/2019  found   Last Labs DM:   Lab Results   Component Value Date    LABA1C 7.1 04/26/2019     Last Lipid:   Lab Results   Component Value Date    CHOL 188 04/26/2019    TRIG 402 04/26/2019    HDL 78 04/26/2019    [address] see below 04/26/2019     Last PSA: No results found for: PSA  Last Thyroid:   Lab Results   Component Value Date    TSH 3.21 12/08/2017    T4FREE 1.5 04/26/2019

## 2019-07-03 RX ORDER — METFORMIN HYDROCHLORIDE 500 MG/1
TABLET, EXTENDED RELEASE ORAL
Qty: 90 TABLET | Refills: 1 | Status: SHIPPED | OUTPATIENT
Start: 2019-07-03 | End: 2020-02-12 | Stop reason: SDUPTHER

## 2019-07-05 RX ORDER — TIOTROPIUM BROMIDE 18 UG/1
CAPSULE ORAL; RESPIRATORY (INHALATION)
Qty: 90 CAPSULE | Refills: 1 | Status: SHIPPED | OUTPATIENT
Start: 2019-07-05 | End: 2019-11-25 | Stop reason: SDUPTHER

## 2019-07-09 RX ORDER — LEVOTHYROXINE SODIUM 0.1 MG/1
TABLET ORAL
Qty: 90 TABLET | Refills: 1 | Status: SHIPPED | OUTPATIENT
Start: 2019-07-09 | End: 2019-11-25 | Stop reason: SDUPTHER

## 2019-08-02 ENCOUNTER — OFFICE VISIT (OUTPATIENT)
Dept: INTERNAL MEDICINE CLINIC | Age: 79
End: 2019-08-02
Payer: MEDICARE

## 2019-08-02 VITALS
SYSTOLIC BLOOD PRESSURE: 130 MMHG | OXYGEN SATURATION: 91 % | DIASTOLIC BLOOD PRESSURE: 70 MMHG | WEIGHT: 139 LBS | HEART RATE: 89 BPM | BODY MASS INDEX: 24.63 KG/M2 | HEIGHT: 63 IN

## 2019-08-02 DIAGNOSIS — R73.9 HYPERGLYCEMIA: ICD-10-CM

## 2019-08-02 DIAGNOSIS — J96.11 CHRONIC RESPIRATORY FAILURE WITH HYPOXIA (HCC): Primary | ICD-10-CM

## 2019-08-02 DIAGNOSIS — J44.9 COPD, SEVERE (HCC): ICD-10-CM

## 2019-08-02 LAB
CHP ED QC CHECK: NORMAL
GLUCOSE BLD-MCNC: 123 MG/DL
HBA1C MFR BLD: 6.3 %

## 2019-08-02 PROCEDURE — 1090F PRES/ABSN URINE INCON ASSESS: CPT | Performed by: INTERNAL MEDICINE

## 2019-08-02 PROCEDURE — 1036F TOBACCO NON-USER: CPT | Performed by: INTERNAL MEDICINE

## 2019-08-02 PROCEDURE — 4040F PNEUMOC VAC/ADMIN/RCVD: CPT | Performed by: INTERNAL MEDICINE

## 2019-08-02 PROCEDURE — 3023F SPIROM DOC REV: CPT | Performed by: INTERNAL MEDICINE

## 2019-08-02 PROCEDURE — 82962 GLUCOSE BLOOD TEST: CPT | Performed by: INTERNAL MEDICINE

## 2019-08-02 PROCEDURE — 83036 HEMOGLOBIN GLYCOSYLATED A1C: CPT | Performed by: INTERNAL MEDICINE

## 2019-08-02 PROCEDURE — G8427 DOCREV CUR MEDS BY ELIG CLIN: HCPCS | Performed by: INTERNAL MEDICINE

## 2019-08-02 PROCEDURE — G8926 SPIRO NO PERF OR DOC: HCPCS | Performed by: INTERNAL MEDICINE

## 2019-08-02 PROCEDURE — 99214 OFFICE O/P EST MOD 30 MIN: CPT | Performed by: INTERNAL MEDICINE

## 2019-08-02 PROCEDURE — G8420 CALC BMI NORM PARAMETERS: HCPCS | Performed by: INTERNAL MEDICINE

## 2019-08-02 PROCEDURE — 1123F ACP DISCUSS/DSCN MKR DOCD: CPT | Performed by: INTERNAL MEDICINE

## 2019-08-02 PROCEDURE — G8399 PT W/DXA RESULTS DOCUMENT: HCPCS | Performed by: INTERNAL MEDICINE

## 2019-08-02 RX ORDER — GLUCOSAMINE HCL/CHONDROITIN SU 500-400 MG
CAPSULE ORAL
Qty: 100 STRIP | Refills: 3 | Status: SHIPPED | OUTPATIENT
Start: 2019-08-02 | End: 2021-05-03 | Stop reason: SDUPTHER

## 2019-08-02 RX ORDER — LANCETS 30 GAUGE
1 EACH MISCELLANEOUS DAILY
Qty: 100 EACH | Refills: 3 | Status: SHIPPED | OUTPATIENT
Start: 2019-08-02 | End: 2021-05-03 | Stop reason: SDUPTHER

## 2019-08-02 ASSESSMENT — ENCOUNTER SYMPTOMS
WHEEZING: 0
COUGH: 0
GASTROINTESTINAL NEGATIVE: 1
CHEST TIGHTNESS: 0
TROUBLE SWALLOWING: 0
SHORTNESS OF BREATH: 0

## 2019-08-02 NOTE — PROGRESS NOTES
sisters   Plan: Will check A1C   Continue current medications  If she did not feel taking prednisone daily;y did not help her symptoms,she should discuss with DR. Shasta Villeda about cutting dose and stopping prednisone unless there is response seen on lung function tests. Other options include adding theophylline ,zithromax,or delarysp      See in  3 months    Bola Barreto MD

## 2019-08-20 ENCOUNTER — OFFICE VISIT (OUTPATIENT)
Dept: PULMONOLOGY | Age: 79
End: 2019-08-20
Payer: MEDICARE

## 2019-08-20 VITALS
HEART RATE: 94 BPM | SYSTOLIC BLOOD PRESSURE: 122 MMHG | DIASTOLIC BLOOD PRESSURE: 84 MMHG | OXYGEN SATURATION: 90 % | HEIGHT: 62 IN | BODY MASS INDEX: 26.13 KG/M2 | TEMPERATURE: 97.7 F | WEIGHT: 142 LBS | RESPIRATION RATE: 16 BRPM

## 2019-08-20 DIAGNOSIS — J43.2 CENTRILOBULAR EMPHYSEMA (HCC): Primary | ICD-10-CM

## 2019-08-20 DIAGNOSIS — J96.11 CHRONIC RESPIRATORY FAILURE WITH HYPOXIA (HCC): ICD-10-CM

## 2019-08-20 PROCEDURE — 1090F PRES/ABSN URINE INCON ASSESS: CPT | Performed by: INTERNAL MEDICINE

## 2019-08-20 PROCEDURE — G8399 PT W/DXA RESULTS DOCUMENT: HCPCS | Performed by: INTERNAL MEDICINE

## 2019-08-20 PROCEDURE — 3023F SPIROM DOC REV: CPT | Performed by: INTERNAL MEDICINE

## 2019-08-20 PROCEDURE — G8926 SPIRO NO PERF OR DOC: HCPCS | Performed by: INTERNAL MEDICINE

## 2019-08-20 PROCEDURE — 1036F TOBACCO NON-USER: CPT | Performed by: INTERNAL MEDICINE

## 2019-08-20 PROCEDURE — 4040F PNEUMOC VAC/ADMIN/RCVD: CPT | Performed by: INTERNAL MEDICINE

## 2019-08-20 PROCEDURE — 99213 OFFICE O/P EST LOW 20 MIN: CPT | Performed by: INTERNAL MEDICINE

## 2019-08-20 PROCEDURE — 1123F ACP DISCUSS/DSCN MKR DOCD: CPT | Performed by: INTERNAL MEDICINE

## 2019-08-20 PROCEDURE — G8427 DOCREV CUR MEDS BY ELIG CLIN: HCPCS | Performed by: INTERNAL MEDICINE

## 2019-08-20 PROCEDURE — G8419 CALC BMI OUT NRM PARAM NOF/U: HCPCS | Performed by: INTERNAL MEDICINE

## 2019-08-20 RX ORDER — BUDESONIDE AND FORMOTEROL FUMARATE DIHYDRATE 160; 4.5 UG/1; UG/1
2 AEROSOL RESPIRATORY (INHALATION) 2 TIMES DAILY
Qty: 1 INHALER | Refills: 3 | COMMUNITY
Start: 2019-08-20 | End: 2019-10-18

## 2019-08-20 NOTE — PROGRESS NOTES
Chief complaint  This is a 77y.o. year old female  who presents with a chief complaint of   Chief Complaint   Patient presents with    Follow-up     emphysema        HPI  69-year-old woman with moderate COPD (FEV1 1.36 L, 61% predicted), chronic hypoxic respiratory failure and Mycobacterium avium complex infection (completed 18 months of therapy) presents for follow-up. A few weeks ago she was changed to [facility], the generic form of Advair. She feels that her shortness of breath has increased. She is short of breath walking in her home. She is able to do her usual activities, but it takes her longer to do them. She denies cough or sputum production. She is compliant with Spiriva daily. She uses Duonebs twice a day. She has not used the 3% saline nebulizers. She is on prednisone 10 mg daily. She uses 2 L of oxygen at rest and 5L to 8 L with ambulation. She has completed pulmonary rehabilitation. Past Medical History:   Diagnosis Date    COPD (chronic obstructive pulmonary disease) ([address]     GERD (gastroesophageal reflux disease)     High cholesterol     Osteoporosis        Past Surgical History:   Procedure Laterality Date     SECTION      4 times    COLONOSCOPY  . redundant colon       Current Outpatient Medications   Medication Sig Dispense Refill    Lancets MISC 1 each by Does not apply route daily 100 each 3    blood glucose monitor strips Test 1  time a day & as needed for symptoms of irregular blood glucose.  100 strip 3    predniSONE (DELTASONE) 10 MG tablet TAKE 1 TABLET EVERY DAY 90 tablet 1    levothyroxine (SYNTHROID) 100 MCG tablet TAKE 1 TABLET EVERY DAY 90 tablet 1    SPIRIVA HANDIHALER 18 MCG inhalation capsule INHALE THE CONTENTS OF 1 CAPSULE DAILY 90 capsule 1    metFORMIN (GLUCOPHAGE-XR) 500 MG extended release tablet TAKE ONE TABLET BY MOUTH DAILY (WITH BREAKFAST) 90 tablet 1    fluticasone-salmeterol (WIXELA INHUB) 500-50 MCG/DOSE diskus inhaler INHALE 1 PUFF TWICE DAILY 180 each 1    montelukast (SINGULAIR) 10 MG tablet TAKE 1 TABLET NIGHTLY 90 tablet 3    ipratropium-albuterol (DUONEB) 0.5-2.5 (3) MG/3ML SOLN nebulizer solution Inhale 3 mLs into the lungs every 4 hours 540 mL 3    sodium chloride, Inhalant, (NEBUSAL) 3 % nebulizer solution Take 15 mLs by nebulization as needed for Cough (Cough) 180 mL 3    atorvastatin (LIPITOR) 20 MG tablet TAKE 1 TABLET EVERY DAY 90 tablet 1    omeprazole (PRILOSEC) 40 MG delayed release capsule TAKE 1 CAPSULE EVERY DAY 90 capsule 1    aspirin 81 MG tablet Take 81 mg by mouth every 48 hours as needed for Pain      Calcium Carb-Cholecalciferol 600-500 MG-UNIT CAPS Take 1 tablet by mouth 2 times daily 1 capsule 0    albuterol (PROVENTIL) (2.5 MG/3ML) 0.083% nebulizer solution Take 3 mLs by nebulization 2 times daily 180 mL 11    saline nasal gel (AYR) GEL APPLY INSIDE BOTH NOSTRILS TWO TIMES A DAY 1 Tube 2    Misc. Devices (ACAPELLA) MISC 1 Device by Does not apply route daily 1 each 0    PROAIR  (90 BASE) MCG/ACT inhaler INHALE TWO PUFFS BY MOUTH EVERY 4 HOURS AS NEEDED FOR WHEEZING 8.5 g 2    sodium chloride 3 % nebulizer solution Take 15 mLs by nebulization as needed for Other or Cough 300 mL 1     No current facility-administered medications for this visit.         Allergies   Allergen Reactions    Sulfa Antibiotics        Family History   Problem Relation Age of Onset    Diabetes Sister         x's 3    Other Sister         cad    High Blood Pressure Mother     Diabetes Mother     Heart Attack Mother     Other Mother         hypothyroidism    High Blood Pressure Father     Heart Attack Father     Heart Attack Maternal Aunt        Social History     Socioeconomic History    Marital status:      Spouse name: Not on file    Number of children: Not on file    Years of education: Not on file    Highest education level: Not on file   Occupational History    Not on file   Social Needs    up.    COPD- She has moderate lower airflow obstruction on PFTs. She has noticed worsening of her breathing with changing from [facility]. She was given a sample of Symbicort 160/4.5 mcg to be used twice daily. She will let me know whether this helps to improve her breathing or not. She will continue Spiriva daily. Continue Duonebs twice daily and 3% saline nebulizers as needed. Will continue prednisone 10 mg daily with omeprazole, calcium and vitamin D. She has completed pulmonary rehabilitation. Chronic hypoxic respiratory failure - She will continue 2L to 3 L of oxygen at rest and 5L to 8L with ambulation. She is to return for follow-up in 2 months.       Melva Rodriguez MD  [place] [facility], Critical Care and Sleep

## 2019-09-18 ENCOUNTER — TELEPHONE (OUTPATIENT)
Dept: PULMONOLOGY | Age: 79
End: 2019-09-18

## 2019-09-18 RX ORDER — ATORVASTATIN CALCIUM 20 MG/1
TABLET, FILM COATED ORAL
Qty: 90 TABLET | Refills: 1 | Status: SHIPPED | OUTPATIENT
Start: 2019-09-18 | End: 2020-03-25

## 2019-09-18 RX ORDER — AMOXICILLIN AND CLAVULANATE POTASSIUM 875; 125 MG/1; MG/1
1 TABLET, FILM COATED ORAL 2 TIMES DAILY
Qty: 14 TABLET | Refills: 0 | Status: SHIPPED | OUTPATIENT
Start: 2019-09-18 | End: 2019-09-25

## 2019-09-18 RX ORDER — PREDNISONE 20 MG/1
TABLET ORAL
Qty: 15 TABLET | Refills: 0 | Status: SHIPPED | OUTPATIENT
Start: 2019-09-18 | End: 2019-10-18

## 2019-09-18 NOTE — TELEPHONE ENCOUNTER
I have sent a prescription for 7 days of Augmentin and 10 days of prednisone. She should let me know if she is not feeling better.

## 2019-10-04 ENCOUNTER — TELEPHONE (OUTPATIENT)
Dept: PULMONOLOGY | Age: 79
End: 2019-10-04

## 2019-10-04 DIAGNOSIS — R05.9 COUGH: Primary | ICD-10-CM

## 2019-10-08 ENCOUNTER — HOSPITAL ENCOUNTER (OUTPATIENT)
Dept: GENERAL RADIOLOGY | Age: 79
Discharge: HOME | End: 2019-10-08
Payer: MEDICARE

## 2019-10-08 ENCOUNTER — OFFICE VISIT (OUTPATIENT)
Dept: PULMONOLOGY | Age: 79
End: 2019-10-08
Payer: MEDICARE

## 2019-10-08 ENCOUNTER — HOSPITAL ENCOUNTER (OUTPATIENT)
Age: 79
Discharge: HOME | End: 2019-10-08
Payer: MEDICARE

## 2019-10-08 VITALS
WEIGHT: 141 LBS | OXYGEN SATURATION: 94 % | DIASTOLIC BLOOD PRESSURE: 56 MMHG | HEART RATE: 78 BPM | SYSTOLIC BLOOD PRESSURE: 110 MMHG | BODY MASS INDEX: 25.95 KG/M2 | HEIGHT: 62 IN

## 2019-10-08 DIAGNOSIS — R05.9 COUGH: ICD-10-CM

## 2019-10-08 DIAGNOSIS — J43.2 CENTRILOBULAR EMPHYSEMA (HCC): Primary | ICD-10-CM

## 2019-10-08 DIAGNOSIS — J96.11 CHRONIC RESPIRATORY FAILURE WITH HYPOXIA (HCC): ICD-10-CM

## 2019-10-08 PROCEDURE — G8427 DOCREV CUR MEDS BY ELIG CLIN: HCPCS | Performed by: INTERNAL MEDICINE

## 2019-10-08 PROCEDURE — G8926 SPIRO NO PERF OR DOC: HCPCS | Performed by: INTERNAL MEDICINE

## 2019-10-08 PROCEDURE — 1036F TOBACCO NON-USER: CPT | Performed by: INTERNAL MEDICINE

## 2019-10-08 PROCEDURE — G8484 FLU IMMUNIZE NO ADMIN: HCPCS | Performed by: INTERNAL MEDICINE

## 2019-10-08 PROCEDURE — 99214 OFFICE O/P EST MOD 30 MIN: CPT | Performed by: INTERNAL MEDICINE

## 2019-10-08 PROCEDURE — 1090F PRES/ABSN URINE INCON ASSESS: CPT | Performed by: INTERNAL MEDICINE

## 2019-10-08 PROCEDURE — 4040F PNEUMOC VAC/ADMIN/RCVD: CPT | Performed by: INTERNAL MEDICINE

## 2019-10-08 PROCEDURE — 71046 X-RAY EXAM CHEST 2 VIEWS: CPT

## 2019-10-08 PROCEDURE — G8399 PT W/DXA RESULTS DOCUMENT: HCPCS | Performed by: INTERNAL MEDICINE

## 2019-10-08 PROCEDURE — 1123F ACP DISCUSS/DSCN MKR DOCD: CPT | Performed by: INTERNAL MEDICINE

## 2019-10-08 PROCEDURE — 3023F SPIROM DOC REV: CPT | Performed by: INTERNAL MEDICINE

## 2019-10-08 PROCEDURE — G8419 CALC BMI OUT NRM PARAM NOF/U: HCPCS | Performed by: INTERNAL MEDICINE

## 2019-10-14 ENCOUNTER — OFFICE VISIT (OUTPATIENT)
Dept: INTERNAL MEDICINE CLINIC | Age: 79
End: 2019-10-14
Payer: MEDICARE

## 2019-10-14 VITALS
DIASTOLIC BLOOD PRESSURE: 54 MMHG | BODY MASS INDEX: 24.98 KG/M2 | OXYGEN SATURATION: 88 % | SYSTOLIC BLOOD PRESSURE: 118 MMHG | HEIGHT: 63 IN | HEART RATE: 94 BPM

## 2019-10-14 DIAGNOSIS — S81.812A SKIN TEAR OF LOWER LEG WITHOUT COMPLICATION, LEFT, INITIAL ENCOUNTER: ICD-10-CM

## 2019-10-14 DIAGNOSIS — L03.116 CELLULITIS OF LEFT LEG: ICD-10-CM

## 2019-10-14 PROCEDURE — G8399 PT W/DXA RESULTS DOCUMENT: HCPCS | Performed by: INTERNAL MEDICINE

## 2019-10-14 PROCEDURE — 4040F PNEUMOC VAC/ADMIN/RCVD: CPT | Performed by: INTERNAL MEDICINE

## 2019-10-14 PROCEDURE — 1123F ACP DISCUSS/DSCN MKR DOCD: CPT | Performed by: INTERNAL MEDICINE

## 2019-10-14 PROCEDURE — G8420 CALC BMI NORM PARAMETERS: HCPCS | Performed by: INTERNAL MEDICINE

## 2019-10-14 PROCEDURE — G8427 DOCREV CUR MEDS BY ELIG CLIN: HCPCS | Performed by: INTERNAL MEDICINE

## 2019-10-14 PROCEDURE — G8484 FLU IMMUNIZE NO ADMIN: HCPCS | Performed by: INTERNAL MEDICINE

## 2019-10-14 PROCEDURE — 1090F PRES/ABSN URINE INCON ASSESS: CPT | Performed by: INTERNAL MEDICINE

## 2019-10-14 PROCEDURE — 99213 OFFICE O/P EST LOW 20 MIN: CPT | Performed by: INTERNAL MEDICINE

## 2019-10-14 PROCEDURE — 1036F TOBACCO NON-USER: CPT | Performed by: INTERNAL MEDICINE

## 2019-10-14 RX ORDER — CLINDAMYCIN HYDROCHLORIDE 300 MG/1
300 CAPSULE ORAL 3 TIMES DAILY
Qty: 30 CAPSULE | Refills: 0 | Status: SHIPPED | OUTPATIENT
Start: 2019-10-14 | End: 2019-10-17 | Stop reason: ALTCHOICE

## 2019-10-14 RX ORDER — TRAMADOL HYDROCHLORIDE 50 MG/1
50 TABLET ORAL EVERY 8 HOURS PRN
Qty: 20 TABLET | Refills: 0 | Status: SHIPPED | OUTPATIENT
Start: 2019-10-14 | End: 2019-10-18

## 2019-10-17 LAB
GRAM STAIN RESULT: ABNORMAL
ORGANISM: ABNORMAL
WOUND/ABSCESS: ABNORMAL
WOUND/ABSCESS: ABNORMAL

## 2019-10-17 RX ORDER — CIPROFLOXACIN 500 MG/1
500 TABLET, FILM COATED ORAL 2 TIMES DAILY
Qty: 20 TABLET | Refills: 0 | Status: SHIPPED | OUTPATIENT
Start: 2019-10-17 | End: 2019-10-27

## 2019-10-18 ENCOUNTER — TELEPHONE (OUTPATIENT)
Dept: INTERNAL MEDICINE CLINIC | Age: 79
End: 2019-10-18

## 2019-10-18 ENCOUNTER — OFFICE VISIT (OUTPATIENT)
Dept: INTERNAL MEDICINE CLINIC | Age: 79
End: 2019-10-18
Payer: MEDICARE

## 2019-10-18 ENCOUNTER — TELEPHONE (OUTPATIENT)
Dept: PULMONOLOGY | Age: 79
End: 2019-10-18

## 2019-10-18 VITALS
DIASTOLIC BLOOD PRESSURE: 64 MMHG | BODY MASS INDEX: 24.98 KG/M2 | WEIGHT: 141 LBS | SYSTOLIC BLOOD PRESSURE: 124 MMHG | HEART RATE: 87 BPM | OXYGEN SATURATION: 91 %

## 2019-10-18 DIAGNOSIS — L03.116 CELLULITIS OF LEFT LEG: ICD-10-CM

## 2019-10-18 DIAGNOSIS — S81.812A SKIN TEAR OF LOWER LEG WITHOUT COMPLICATION, LEFT, INITIAL ENCOUNTER: Primary | ICD-10-CM

## 2019-10-18 PROCEDURE — 99212 OFFICE O/P EST SF 10 MIN: CPT | Performed by: INTERNAL MEDICINE

## 2019-10-18 PROCEDURE — G8484 FLU IMMUNIZE NO ADMIN: HCPCS | Performed by: INTERNAL MEDICINE

## 2019-10-18 PROCEDURE — 4040F PNEUMOC VAC/ADMIN/RCVD: CPT | Performed by: INTERNAL MEDICINE

## 2019-10-18 PROCEDURE — G8420 CALC BMI NORM PARAMETERS: HCPCS | Performed by: INTERNAL MEDICINE

## 2019-10-18 PROCEDURE — G8427 DOCREV CUR MEDS BY ELIG CLIN: HCPCS | Performed by: INTERNAL MEDICINE

## 2019-10-18 PROCEDURE — G8399 PT W/DXA RESULTS DOCUMENT: HCPCS | Performed by: INTERNAL MEDICINE

## 2019-10-18 PROCEDURE — 1123F ACP DISCUSS/DSCN MKR DOCD: CPT | Performed by: INTERNAL MEDICINE

## 2019-10-18 PROCEDURE — 1090F PRES/ABSN URINE INCON ASSESS: CPT | Performed by: INTERNAL MEDICINE

## 2019-10-18 PROCEDURE — 1036F TOBACCO NON-USER: CPT | Performed by: INTERNAL MEDICINE

## 2019-10-22 DIAGNOSIS — R05.9 COUGH: ICD-10-CM

## 2019-10-22 DIAGNOSIS — J43.2 CENTRILOBULAR EMPHYSEMA (HCC): ICD-10-CM

## 2019-10-22 DIAGNOSIS — J43.2 CENTRILOBULAR EMPHYSEMA (HCC): Primary | ICD-10-CM

## 2019-10-22 LAB — THEOPHYLLINE LEVEL: 4.9 UG/ML (ref 8–20)

## 2019-10-25 LAB — IGE: 5 KU/L

## 2019-10-27 LAB
ALLERGEN ASPERGILLUS FUMIGATUS IGE: <0.1 KU/L
ALLERGEN BERMUDA GRASS IGE: <0.1 KU/L
ALLERGEN BIRCH IGE: <0.1 KU/L
ALLERGEN CAT DANDER IGE: <0.1 KU/L
ALLERGEN COMMON SHORT RAGWEED IGE: NORMAL KU/L
ALLERGEN COTTONWOOD: <0.1 KU/L
ALLERGEN DOG DANDER IGE: <0.1 KU/L
ALLERGEN ELM IGE: <0.1 KU/L
ALLERGEN FUNGI/MOLD M.RACEMOSUS IGE: <0.1 KU/L
ALLERGEN GERMAN COCKROACH IGE: <0.1 KU/L
ALLERGEN HORMODENDRUM HORDEI IGE: <0.1 KU/L
ALLERGEN MAPLE/BOX ELDER IGE: <0.1 KU/L
ALLERGEN MITE DUST FARINAE IGE: <0.1 KU/L
ALLERGEN MITE DUST PTERONYSSINUS IGE: <0.1 KU/L
ALLERGEN MOUNTAIN CEDAR: <0.1 KU/L
ALLERGEN MOUSE EPITHELIA IGE: <0.1 KU/L
ALLERGEN OAK TREE IGE: <0.1 KU/L
ALLERGEN PECAN TREE IGE: <0.1 KU/L
ALLERGEN PENICILLIUM NOTATUM: <0.1 KU/L
ALLERGEN ROUGH PIGWEED (W14) IGE: NORMAL KU/L
ALLERGEN RUSSIAN THISTLE IGE: NORMAL KU/L
ALLERGEN SEE NOTE: NORMAL
ALLERGEN SHEEP SORREL (W18) IGE: NORMAL KU/L
ALLERGEN TIMOTHY GRASS: <0.1 KU/L
ALLERGEN TREE SYCAMORE: <0.1 KU/L
ALLERGEN WALNUT TREE IGE: <0.1 KU/L
ALLERGEN WHITE MULBERRY TREE, IGE: <0.1 KU/L
ALLERGEN, TREE, WHITE ASH IGE: <0.1 KU/L
IGE: 6 KU/L

## 2019-11-04 LAB
FINAL REPORT: NORMAL
PRELIMINARY: NORMAL

## 2019-11-06 DIAGNOSIS — J43.2 CENTRILOBULAR EMPHYSEMA (HCC): Primary | ICD-10-CM

## 2019-11-06 DIAGNOSIS — J44.9 COPD, SEVERE (HCC): Primary | ICD-10-CM

## 2019-11-06 DIAGNOSIS — J43.2 CENTRILOBULAR EMPHYSEMA (HCC): ICD-10-CM

## 2019-11-06 LAB — THEOPHYLLINE LEVEL: 5.2 UG/ML (ref 8–20)

## 2019-11-08 ENCOUNTER — OFFICE VISIT (OUTPATIENT)
Dept: INTERNAL MEDICINE CLINIC | Age: 79
End: 2019-11-08
Payer: MEDICARE

## 2019-11-08 VITALS
HEIGHT: 63 IN | DIASTOLIC BLOOD PRESSURE: 68 MMHG | BODY MASS INDEX: 24.98 KG/M2 | OXYGEN SATURATION: 90 % | WEIGHT: 141 LBS | SYSTOLIC BLOOD PRESSURE: 136 MMHG | HEART RATE: 97 BPM

## 2019-11-08 DIAGNOSIS — E09.9 DRUG OR CHEMICAL INDUCED DIABETES MELLITUS WITHOUT COMPLICATION, WITHOUT LONG-TERM CURRENT USE OF INSULIN (HCC): ICD-10-CM

## 2019-11-08 DIAGNOSIS — J44.9 COPD, SEVERE (HCC): Primary | ICD-10-CM

## 2019-11-08 LAB — HBA1C MFR BLD: 6.5 %

## 2019-11-08 PROCEDURE — 99213 OFFICE O/P EST LOW 20 MIN: CPT | Performed by: INTERNAL MEDICINE

## 2019-11-08 PROCEDURE — G8399 PT W/DXA RESULTS DOCUMENT: HCPCS | Performed by: INTERNAL MEDICINE

## 2019-11-08 PROCEDURE — 1036F TOBACCO NON-USER: CPT | Performed by: INTERNAL MEDICINE

## 2019-11-08 PROCEDURE — 83036 HEMOGLOBIN GLYCOSYLATED A1C: CPT | Performed by: INTERNAL MEDICINE

## 2019-11-08 PROCEDURE — G8427 DOCREV CUR MEDS BY ELIG CLIN: HCPCS | Performed by: INTERNAL MEDICINE

## 2019-11-08 PROCEDURE — 1090F PRES/ABSN URINE INCON ASSESS: CPT | Performed by: INTERNAL MEDICINE

## 2019-11-08 PROCEDURE — G8926 SPIRO NO PERF OR DOC: HCPCS | Performed by: INTERNAL MEDICINE

## 2019-11-08 PROCEDURE — G8420 CALC BMI NORM PARAMETERS: HCPCS | Performed by: INTERNAL MEDICINE

## 2019-11-08 PROCEDURE — 1123F ACP DISCUSS/DSCN MKR DOCD: CPT | Performed by: INTERNAL MEDICINE

## 2019-11-08 PROCEDURE — G8482 FLU IMMUNIZE ORDER/ADMIN: HCPCS | Performed by: INTERNAL MEDICINE

## 2019-11-08 PROCEDURE — 4040F PNEUMOC VAC/ADMIN/RCVD: CPT | Performed by: INTERNAL MEDICINE

## 2019-11-08 PROCEDURE — 3023F SPIROM DOC REV: CPT | Performed by: INTERNAL MEDICINE

## 2019-11-08 ASSESSMENT — ENCOUNTER SYMPTOMS
TROUBLE SWALLOWING: 0
SHORTNESS OF BREATH: 1
GASTROINTESTINAL NEGATIVE: 1
COUGH: 1
WHEEZING: 0
SORE THROAT: 0
CHEST TIGHTNESS: 0

## 2019-11-20 DIAGNOSIS — J43.2 CENTRILOBULAR EMPHYSEMA (HCC): ICD-10-CM

## 2019-11-20 LAB — THEOPHYLLINE LEVEL: 7.4 UG/ML (ref 8–20)

## 2019-11-25 RX ORDER — LEVOTHYROXINE SODIUM 0.1 MG/1
TABLET ORAL
Qty: 90 TABLET | Refills: 0 | Status: SHIPPED | OUTPATIENT
Start: 2019-11-25 | End: 2020-07-22

## 2019-11-25 RX ORDER — TIOTROPIUM BROMIDE 18 UG/1
CAPSULE ORAL; RESPIRATORY (INHALATION)
Qty: 90 CAPSULE | Refills: 0 | Status: SHIPPED | OUTPATIENT
Start: 2019-11-25 | End: 2020-05-20

## 2019-12-02 ENCOUNTER — TELEPHONE (OUTPATIENT)
Dept: PULMONOLOGY | Age: 79
End: 2019-12-02

## 2019-12-02 RX ORDER — AMOXICILLIN AND CLAVULANATE POTASSIUM 875; 125 MG/1; MG/1
1 TABLET, FILM COATED ORAL 2 TIMES DAILY
Qty: 14 TABLET | Refills: 0 | Status: SHIPPED | OUTPATIENT
Start: 2019-12-02 | End: 2019-12-09

## 2019-12-02 RX ORDER — PREDNISONE 20 MG/1
TABLET ORAL
Qty: 15 TABLET | Refills: 0 | Status: SHIPPED | OUTPATIENT
Start: 2019-12-02 | End: 2019-12-26

## 2019-12-26 ENCOUNTER — OFFICE VISIT (OUTPATIENT)
Dept: INTERNAL MEDICINE CLINIC | Age: 79
End: 2019-12-26
Payer: MEDICARE

## 2019-12-26 VITALS
BODY MASS INDEX: 24.17 KG/M2 | OXYGEN SATURATION: 94 % | WEIGHT: 136.4 LBS | TEMPERATURE: 97.9 F | SYSTOLIC BLOOD PRESSURE: 128 MMHG | DIASTOLIC BLOOD PRESSURE: 66 MMHG | HEIGHT: 63 IN | RESPIRATION RATE: 20 BRPM | HEART RATE: 100 BPM

## 2019-12-26 DIAGNOSIS — S61.451A BITE WOUND OF RIGHT HAND, INITIAL ENCOUNTER: Primary | ICD-10-CM

## 2019-12-26 PROCEDURE — G8399 PT W/DXA RESULTS DOCUMENT: HCPCS | Performed by: NURSE PRACTITIONER

## 2019-12-26 PROCEDURE — G8420 CALC BMI NORM PARAMETERS: HCPCS | Performed by: NURSE PRACTITIONER

## 2019-12-26 PROCEDURE — 1123F ACP DISCUSS/DSCN MKR DOCD: CPT | Performed by: NURSE PRACTITIONER

## 2019-12-26 PROCEDURE — 99213 OFFICE O/P EST LOW 20 MIN: CPT | Performed by: NURSE PRACTITIONER

## 2019-12-26 PROCEDURE — 1090F PRES/ABSN URINE INCON ASSESS: CPT | Performed by: NURSE PRACTITIONER

## 2019-12-26 PROCEDURE — G8482 FLU IMMUNIZE ORDER/ADMIN: HCPCS | Performed by: NURSE PRACTITIONER

## 2019-12-26 PROCEDURE — G8427 DOCREV CUR MEDS BY ELIG CLIN: HCPCS | Performed by: NURSE PRACTITIONER

## 2019-12-26 PROCEDURE — 1036F TOBACCO NON-USER: CPT | Performed by: NURSE PRACTITIONER

## 2019-12-26 PROCEDURE — 4040F PNEUMOC VAC/ADMIN/RCVD: CPT | Performed by: NURSE PRACTITIONER

## 2019-12-26 RX ORDER — DOXYCYCLINE HYCLATE 100 MG
100 TABLET ORAL 2 TIMES DAILY
Qty: 14 TABLET | Refills: 0 | Status: SHIPPED | OUTPATIENT
Start: 2019-12-26 | End: 2020-01-02

## 2019-12-26 SDOH — ECONOMIC STABILITY: TRANSPORTATION INSECURITY
IN THE PAST 12 MONTHS, HAS THE LACK OF TRANSPORTATION KEPT YOU FROM MEDICAL APPOINTMENTS OR FROM GETTING MEDICATIONS?: NO

## 2019-12-26 SDOH — ECONOMIC STABILITY: INCOME INSECURITY: HOW HARD IS IT FOR YOU TO PAY FOR THE VERY BASICS LIKE FOOD, HOUSING, MEDICAL CARE, AND HEATING?: NOT HARD AT ALL

## 2019-12-26 SDOH — ECONOMIC STABILITY: FOOD INSECURITY: WITHIN THE PAST 12 MONTHS, THE FOOD YOU BOUGHT JUST DIDN'T LAST AND YOU DIDN'T HAVE MONEY TO GET MORE.: NEVER TRUE

## 2019-12-26 SDOH — ECONOMIC STABILITY: FOOD INSECURITY: WITHIN THE PAST 12 MONTHS, YOU WORRIED THAT YOUR FOOD WOULD RUN OUT BEFORE YOU GOT MONEY TO BUY MORE.: NEVER TRUE

## 2019-12-26 SDOH — ECONOMIC STABILITY: TRANSPORTATION INSECURITY
IN THE PAST 12 MONTHS, HAS LACK OF TRANSPORTATION KEPT YOU FROM MEETINGS, WORK, OR FROM GETTING THINGS NEEDED FOR DAILY LIVING?: NO

## 2019-12-26 ASSESSMENT — ENCOUNTER SYMPTOMS: COUGH: 1

## 2019-12-26 NOTE — TELEPHONE ENCOUNTER
Patient called in requesting a 90 day of the [place] 24 medication . Melva Kim only gave her qty of 21.   Please send to [facility]

## 2020-01-08 ENCOUNTER — OFFICE VISIT (OUTPATIENT)
Dept: PULMONOLOGY | Age: 80
End: 2020-01-08
Payer: MEDICARE

## 2020-01-08 VITALS
TEMPERATURE: 97.8 F | WEIGHT: 140 LBS | DIASTOLIC BLOOD PRESSURE: 70 MMHG | SYSTOLIC BLOOD PRESSURE: 110 MMHG | BODY MASS INDEX: 24.8 KG/M2 | OXYGEN SATURATION: 90 % | HEIGHT: 63 IN | RESPIRATION RATE: 18 BRPM | HEART RATE: 78 BPM

## 2020-01-08 PROCEDURE — 3023F SPIROM DOC REV: CPT | Performed by: INTERNAL MEDICINE

## 2020-01-08 PROCEDURE — G8427 DOCREV CUR MEDS BY ELIG CLIN: HCPCS | Performed by: INTERNAL MEDICINE

## 2020-01-08 PROCEDURE — G8926 SPIRO NO PERF OR DOC: HCPCS | Performed by: INTERNAL MEDICINE

## 2020-01-08 PROCEDURE — 1036F TOBACCO NON-USER: CPT | Performed by: INTERNAL MEDICINE

## 2020-01-08 PROCEDURE — G8399 PT W/DXA RESULTS DOCUMENT: HCPCS | Performed by: INTERNAL MEDICINE

## 2020-01-08 PROCEDURE — G8420 CALC BMI NORM PARAMETERS: HCPCS | Performed by: INTERNAL MEDICINE

## 2020-01-08 PROCEDURE — 1123F ACP DISCUSS/DSCN MKR DOCD: CPT | Performed by: INTERNAL MEDICINE

## 2020-01-08 PROCEDURE — 1090F PRES/ABSN URINE INCON ASSESS: CPT | Performed by: INTERNAL MEDICINE

## 2020-01-08 PROCEDURE — G8482 FLU IMMUNIZE ORDER/ADMIN: HCPCS | Performed by: INTERNAL MEDICINE

## 2020-01-08 PROCEDURE — 4040F PNEUMOC VAC/ADMIN/RCVD: CPT | Performed by: INTERNAL MEDICINE

## 2020-01-08 PROCEDURE — 99214 OFFICE O/P EST MOD 30 MIN: CPT | Performed by: INTERNAL MEDICINE

## 2020-01-08 NOTE — PROGRESS NOTES
cad    High Blood Pressure Mother     Diabetes Mother     Heart Attack Mother     Other Mother         hypothyroidism    High Blood Pressure Father     Heart Attack Father     Heart Attack Maternal Aunt        Social History     Socioeconomic History    Marital status:      Spouse name: Not on file    Number of children: Not on file    Years of education: Not on file    Highest education level: Not on file   Occupational History    Not on file   Social Needs    Financial resource strain: Not hard at all   Base[employer] insecurity:     Worry: Never true     Inability: Never true   [place] needs:     Medical: No     Non-medical: No   Tobacco Use    Smoking status: Former Smoker     Packs/day: 1.50     Years: 50.00     Pack years: 75.00     Types: Cigarettes     Start date: 1951     Last attempt to quit: 1999     Years since quittin.3    Smokeless tobacco: Never Used    Tobacco comment: quit 10 years ago   Substance and Sexual Activity    Alcohol use: No     Alcohol/week: 0.0 standard drinks    Drug use: No    Sexual activity: Not on file   Lifestyle    Physical activity:     Days per week: Not on file     Minutes per session: Not on file    Stress: Not on file   Relationships    Social connections:     Talks on phone: Not on file     Gets together: Not on file     Attends Advent service: Not on file     Active member of club or organization: Not on file     Attends meetings of clubs or organizations: Not on file     Relationship status: Not on file    Intimate partner violence:     Fear of current or ex partner: Not on file     Emotionally abused: Not on file     Physically abused: Not on file     Forced sexual activity: Not on file   Other Topics Concern    Not on file   Social History Narrative    Not on file       Immunization History   Administered Date(s) Administered    Influenza Nasal 2014    Influenza Vaccine, unspecified formulation 10/29/2014, 10/12/2015, 11/15/2016    Influenza Virus Vaccine 10/01/2008, 11/01/2014    Influenza Whole 10/01/2008    Influenza, High Dose (Fluzone 65 yrs and older) 10/12/2015, 11/15/2016, 10/11/2018, 10/28/2019    Influenza, Dilma Creamer, Recombinant, IM PF (Flublok 18 yrs and older) 10/28/2019    Pneumococcal Conjugate 13-valent ([id number]) 04/15/2016    Pneumococcal Conjugate 7-valent (Prevnar7) 11/01/2005, 10/11/2010    Pneumococcal Polysaccharide ([id number]) 11/01/2005, 02/26/2017       ROS:  GENERAL:  No fevers, no chills, no weight gain or loss  RESPIRATORY:  +exertional shortness of breath, no cough      PHYSICAL EXAM:  Vitals:    01/08/20 0812   BP: 110/70   Site: Left Upper Arm   Position: Sitting   Cuff Size: Medium Adult   Pulse: 78   Resp: 18   Temp: 97.8 °F (36.6 °C)   TempSrc: Oral   SpO2: 90%   Weight: 140 lb (63.5 kg)   Height: 5' 3\" (1.6 m)   on 2L NC    Gen: Well developed; well nourished  Eyes: No scleral icterus. No conjunctival injection. ENT:  Oropharynx clear. External appearance of ears and nose normal.  Neck: Trachea midline. No obvious mass. No visible thyroid enlargement    Respiratory: Diffusely decreased breath sounds, no accessory muscle use  Cardiovascular: Regular rate and rhythm, no appreciable murmurs. Bilateral lower extremity edema. Gastrointestinal: Soft, non-tender. No hernia  Skin: Warm and dry. No rashes or ulcers on visible areas. Normal texture and turgor  Lymphatic: No cervical LAD. No supraclavicular LAD. Musculoskeletal: No cyanosis, clubbing or joint deformity.     Psychiatric: Normal mood and affect; exhibits normal insight and judgement     Data reviewed:  Pulmonary Function Testing (2/8/19)  FVC 2.26 L at 93% predicted ---> 2.28L at 94% predicted  FEV1 1.05 L at 56% predicted -----> 1.14L at 61% predicted  FEV1/FVC ratio at 46%----> 50%  TLC 4.64 L at 95% predicted  VC 2.41L at 99% predicted  ERV 0.86L at 106% predicted  RV/TLC at 48% predicted  DLCO 4.37 at 19%

## 2020-01-17 ENCOUNTER — TELEPHONE (OUTPATIENT)
Dept: PULMONOLOGY | Age: 80
End: 2020-01-17

## 2020-01-20 RX ORDER — DOXYCYCLINE HYCLATE 100 MG
100 TABLET ORAL 2 TIMES DAILY
Qty: 14 TABLET | Refills: 0 | Status: SHIPPED | OUTPATIENT
Start: 2020-01-20 | End: 2020-01-27

## 2020-01-20 NOTE — TELEPHONE ENCOUNTER
I have sent a prescription for 7 days of doxycycline. She should let me know if she does not feel better.

## 2020-02-06 ENCOUNTER — TELEPHONE (OUTPATIENT)
Dept: PULMONOLOGY | Age: 80
End: 2020-02-06

## 2020-02-06 RX ORDER — PREDNISONE 20 MG/1
TABLET ORAL
Qty: 15 TABLET | Refills: 0 | Status: SHIPPED | OUTPATIENT
Start: 2020-02-06 | End: 2020-03-04 | Stop reason: ALTCHOICE

## 2020-02-06 RX ORDER — LEVOFLOXACIN 750 MG/1
750 TABLET ORAL DAILY
Qty: 10 TABLET | Refills: 0 | Status: SHIPPED | OUTPATIENT
Start: 2020-02-06 | End: 2020-02-16

## 2020-02-06 NOTE — TELEPHONE ENCOUNTER
Complains of cough and SOB:  Duration? 2 days  Cough with sputum production? yes  Color? Green/thick  Fever? No  Any other Symptoms? No  Any current treatment tried? Coricidin and Tylenol  Using inhalers? Yes do they help? Yes  Pharmacy?  Yes, verified with the patient     **Had antibiotics for 10 days  **Extra prednisone, has been taking 30 mg instead of the usual 10 mg

## 2020-02-12 ENCOUNTER — OFFICE VISIT (OUTPATIENT)
Dept: INTERNAL MEDICINE CLINIC | Age: 80
End: 2020-02-12
Payer: MEDICARE

## 2020-02-12 VITALS
HEART RATE: 108 BPM | HEIGHT: 63 IN | DIASTOLIC BLOOD PRESSURE: 68 MMHG | WEIGHT: 136 LBS | BODY MASS INDEX: 24.1 KG/M2 | OXYGEN SATURATION: 90 % | SYSTOLIC BLOOD PRESSURE: 138 MMHG

## 2020-02-12 LAB
BASOPHILS ABSOLUTE: 0 K/UL (ref 0–0.2)
BASOPHILS RELATIVE PERCENT: 0.3 %
EOSINOPHILS ABSOLUTE: 0 K/UL (ref 0–0.6)
EOSINOPHILS RELATIVE PERCENT: 0.1 %
HCT VFR BLD CALC: 38.9 % (ref 36–48)
HEMOGLOBIN: 12.6 G/DL (ref 12–16)
LYMPHOCYTES ABSOLUTE: 0.9 K/UL (ref 1–5.1)
LYMPHOCYTES RELATIVE PERCENT: 7.6 %
MCH RBC QN AUTO: 27.2 PG (ref 26–34)
MCHC RBC AUTO-ENTMCNC: 32.4 G/DL (ref 31–36)
MCV RBC AUTO: 84 FL (ref 80–100)
MONOCYTES ABSOLUTE: 0.4 K/UL (ref 0–1.3)
MONOCYTES RELATIVE PERCENT: 3.3 %
NEUTROPHILS ABSOLUTE: 10.4 K/UL (ref 1.7–7.7)
NEUTROPHILS RELATIVE PERCENT: 88.7 %
PDW BLD-RTO: 19.9 % (ref 12.4–15.4)
PLATELET # BLD: 269 K/UL (ref 135–450)
PMV BLD AUTO: 9.1 FL (ref 5–10.5)
RBC # BLD: 4.63 M/UL (ref 4–5.2)
THEOPHYLLINE LEVEL: 9.5 UG/ML (ref 8–20)
WBC # BLD: 11.7 K/UL (ref 4–11)

## 2020-02-12 PROCEDURE — 36415 COLL VENOUS BLD VENIPUNCTURE: CPT | Performed by: INTERNAL MEDICINE

## 2020-02-12 PROCEDURE — 93000 ELECTROCARDIOGRAM COMPLETE: CPT | Performed by: INTERNAL MEDICINE

## 2020-02-12 PROCEDURE — G8420 CALC BMI NORM PARAMETERS: HCPCS | Performed by: INTERNAL MEDICINE

## 2020-02-12 PROCEDURE — 99214 OFFICE O/P EST MOD 30 MIN: CPT | Performed by: INTERNAL MEDICINE

## 2020-02-12 PROCEDURE — 1123F ACP DISCUSS/DSCN MKR DOCD: CPT | Performed by: INTERNAL MEDICINE

## 2020-02-12 PROCEDURE — 1090F PRES/ABSN URINE INCON ASSESS: CPT | Performed by: INTERNAL MEDICINE

## 2020-02-12 PROCEDURE — G8428 CUR MEDS NOT DOCUMENT: HCPCS | Performed by: INTERNAL MEDICINE

## 2020-02-12 PROCEDURE — 4040F PNEUMOC VAC/ADMIN/RCVD: CPT | Performed by: INTERNAL MEDICINE

## 2020-02-12 PROCEDURE — G8399 PT W/DXA RESULTS DOCUMENT: HCPCS | Performed by: INTERNAL MEDICINE

## 2020-02-12 PROCEDURE — G8926 SPIRO NO PERF OR DOC: HCPCS | Performed by: INTERNAL MEDICINE

## 2020-02-12 PROCEDURE — 1036F TOBACCO NON-USER: CPT | Performed by: INTERNAL MEDICINE

## 2020-02-12 PROCEDURE — 3023F SPIROM DOC REV: CPT | Performed by: INTERNAL MEDICINE

## 2020-02-12 PROCEDURE — G8482 FLU IMMUNIZE ORDER/ADMIN: HCPCS | Performed by: INTERNAL MEDICINE

## 2020-02-12 RX ORDER — ALBUTEROL SULFATE 90 UG/1
2 AEROSOL, METERED RESPIRATORY (INHALATION) EVERY 4 HOURS PRN
Qty: 1 INHALER | Refills: 3 | Status: SHIPPED | OUTPATIENT
Start: 2020-02-12 | End: 2020-12-14

## 2020-02-12 RX ORDER — METFORMIN HYDROCHLORIDE 500 MG/1
500 TABLET, EXTENDED RELEASE ORAL
Qty: 90 TABLET | Refills: 1 | Status: SHIPPED | OUTPATIENT
Start: 2020-02-12 | End: 2020-07-22

## 2020-02-12 ASSESSMENT — ENCOUNTER SYMPTOMS
SHORTNESS OF BREATH: 1
TROUBLE SWALLOWING: 0
WHEEZING: 0
CHEST TIGHTNESS: 0
COUGH: 1

## 2020-02-12 NOTE — PATIENT INSTRUCTIONS
Discussed with DR. Del Nguyen  As patient feels ,theophylline did not help her symptoms and possibility of side effects,will stop theophylline  Will arrange for 24 Holter monitor  If symptoms comes back ,she was advised to to ER for evaluation

## 2020-02-12 NOTE — PROGRESS NOTES
Subjective:      Patient ID: Elisa Freeman is a [de-identified] y.o. female. HPI  She had heart burn yesterday and tums helped her and started having palpitations and continue all day and night and th1s am and after she took am pills ,her symptoms resolved tho she is still feeling her pulse to be a little faster than usual and has no associated symptoms with this  Has been feeling shaky  Has no chest pain or tightness and did not effect her breathing when she is having palpitations  At home her oxygen is over 90%  Has no increase in cough or dyspnea  Was on 2 different antibiotics and prednisone in last month  Review of Systems   Constitutional: Negative for chills and fever. HENT: Negative for trouble swallowing. Respiratory: Positive for cough and shortness of breath. Negative for chest tightness and wheezing. Cardiovascular: Positive for palpitations. Negative for chest pain and leg swelling. Neurological: Negative for dizziness, light-headedness and headaches. Objective:   Physical Exam  Vitals signs and nursing note reviewed. Constitutional:       General: She is not in acute distress. HENT:      Mouth/Throat:      Mouth: Mucous membranes are moist.      Pharynx: Oropharynx is clear. Eyes:      Extraocular Movements: Extraocular movements intact. Conjunctiva/sclera: Conjunctivae normal.      Pupils: Pupils are equal, round, and reactive to light. Cardiovascular:      Rate and Rhythm: Regular rhythm. Tachycardia present. Pulses: Normal pulses. Heart sounds: No murmur. No gallop. Pulmonary:      Effort: No respiratory distress. Breath sounds: Normal breath sounds. No wheezing, rhonchi or rales. Musculoskeletal:      Right lower leg: Right lower leg edema: trace ankle edema of lungs. Lymphadenopathy:      Cervical: No cervical adenopathy. Neurological:      Mental Status: She is alert.          Assessment:      Palpitations and ekg is normal-possible side effect of theophylline  Copd   chronic respiratory failure with hypoxia and is on oxygne      Plan:      Discussed with DR. Vin Corral  As patient feels ,theophylline did not help her symptoms and possibility of side effects,will stop theophylline  Will arrange for 24 Holter monitor  If symptoms comes back ,she was advised to to ER for evaluation        China Payne MD

## 2020-02-20 ENCOUNTER — HOSPITAL ENCOUNTER (OUTPATIENT)
Dept: NON INVASIVE DIAGNOSTICS | Age: 80
Discharge: HOME | End: 2020-02-20
Payer: MEDICARE

## 2020-02-20 PROCEDURE — 93225 XTRNL ECG REC<48 HRS REC: CPT

## 2020-02-20 PROCEDURE — 93226 XTRNL ECG REC<48 HR SCAN A/R: CPT

## 2020-02-20 NOTE — PATIENT INSTRUCTIONS
Please come for a follow up in 3 months [FreeTextEntry1] : Pt presents today for an acute visit.\par The patient today complains about occasional episodes of headaches since Monday. Pt states she feels the headache coming in the morning and it worsens throughout the day. These headaches can occur at any time of the day, usually worse in the later day  hours. The patient is not awakened from sleep and no associated visual changes, photophobia, nausea, vomiting, stiff neck, decreased hearing, syncope, paresthesia’s, a motor weakness. Patient gets relief with over-the-counter medications however, not always completely. Patient denies any recent episode of head trauma. Pt does admit to associated lightheadedness. Pt denies fever, chills but does admit to some right sided ear pain. \par Pt currently in menses. \par \par

## 2020-02-22 ENCOUNTER — TELEPHONE (OUTPATIENT)
Dept: PULMONOLOGY | Age: 80
End: 2020-02-22

## 2020-02-22 RX ORDER — DOXYCYCLINE HYCLATE 100 MG
100 TABLET ORAL 2 TIMES DAILY
Qty: 20 TABLET | Refills: 0 | Status: SHIPPED | OUTPATIENT
Start: 2020-02-22 | End: 2020-03-03

## 2020-02-22 RX ORDER — PREDNISONE 20 MG/1
TABLET ORAL
Qty: 15 TABLET | Refills: 0 | Status: SHIPPED | OUTPATIENT
Start: 2020-02-22 | End: 2020-03-04 | Stop reason: ALTCHOICE

## 2020-02-22 NOTE — TELEPHONE ENCOUNTER
Patient called answering service complaining of shortness of breath and purulent sputum. I have sent prescriptions for 10 days of prednisone and doxycycline.

## 2020-02-24 LAB
ACQUISITION DURATION: NORMAL S
AVERAGE HEART RATE: 97 BPM
EKG DIAGNOSIS: NORMAL
HOLTER MAX HEART RATE: 145 BPM
HOOKUP DATE: NORMAL
HOOKUP TIME: NORMAL
MAX HEART RATE TIME/DATE: NORMAL
MIN HEART RATE TIME/DATE: NORMAL
MIN HEART RATE: 67 BPM
NUMBER OF QRS COMPLEXES: NORMAL
NUMBER OF SUPRAVENTRICULAR BEATS IN RUNS: 0
NUMBER OF SUPRAVENTRICULAR COUPLETS: 4
NUMBER OF SUPRAVENTRICULAR ECTOPICS: 648
NUMBER OF SUPRAVENTRICULAR ISOLATED BEATS: 640
NUMBER OF SUPRAVENTRICULAR RUNS: 0
NUMBER OF VENTRICULAR BEATS IN RUNS: 0
NUMBER OF VENTRICULAR BIGEMINAL CYCLES: 0
NUMBER OF VENTRICULAR COUPLETS: 2
NUMBER OF VENTRICULAR ECTOPICS: 48
NUMBER OF VENTRICULAR ISOLATED BEATS: 44
NUMBER OF VENTRICULAR RUNS: 0

## 2020-02-27 ENCOUNTER — HOSPITAL ENCOUNTER (EMERGENCY)
Age: 80
Discharge: HOME | End: 2020-02-27
Attending: EMERGENCY MEDICINE
Payer: MEDICARE

## 2020-02-27 ENCOUNTER — APPOINTMENT (OUTPATIENT)
Dept: GENERAL RADIOLOGY | Age: 80
End: 2020-02-27
Payer: MEDICARE

## 2020-02-27 ENCOUNTER — APPOINTMENT (OUTPATIENT)
Dept: CT IMAGING | Age: 80
End: 2020-02-27
Payer: MEDICARE

## 2020-02-27 VITALS
SYSTOLIC BLOOD PRESSURE: 157 MMHG | DIASTOLIC BLOOD PRESSURE: 70 MMHG | TEMPERATURE: 97.3 F | HEART RATE: 83 BPM | OXYGEN SATURATION: 97 % | RESPIRATION RATE: 18 BRPM

## 2020-02-27 LAB
ANION GAP SERPL CALCULATED.3IONS-SCNC: 15 MMOL/L (ref 3–16)
ANISOCYTOSIS: ABNORMAL
BANDED NEUTROPHILS RELATIVE PERCENT: 4 % (ref 0–7)
BASE EXCESS VENOUS: 5.1 MMOL/L
BASOPHILS ABSOLUTE: 0 K/UL (ref 0–0.2)
BASOPHILS RELATIVE PERCENT: 0 %
BUN BLDV-MCNC: 17 MG/DL (ref 7–20)
CALCIUM SERPL-MCNC: 9.5 MG/DL (ref 8.3–10.6)
CARBOXYHEMOGLOBIN: 0.9 %
CHLORIDE BLD-SCNC: 104 MMOL/L (ref 99–110)
CO2: 24 MMOL/L (ref 21–32)
CREAT SERPL-MCNC: 0.5 MG/DL (ref 0.6–1.2)
EOSINOPHILS ABSOLUTE: 0 K/UL (ref 0–0.6)
EOSINOPHILS RELATIVE PERCENT: 0 %
GFR AFRICAN AMERICAN: >60
GFR NON-AFRICAN AMERICAN: >60
GLUCOSE BLD-MCNC: 167 MG/DL (ref 70–99)
HCO3 VENOUS: 31 MMOL/L (ref 23–29)
HCT VFR BLD CALC: 34.7 % (ref 36–48)
HEMOGLOBIN: 11.2 G/DL (ref 12–16)
HYPOCHROMIA: ABNORMAL
LYMPHOCYTES ABSOLUTE: 0.8 K/UL (ref 1–5.1)
LYMPHOCYTES RELATIVE PERCENT: 7 %
MCH RBC QN AUTO: 27.2 PG (ref 26–34)
MCHC RBC AUTO-ENTMCNC: 32.2 G/DL (ref 31–36)
MCV RBC AUTO: 84.4 FL (ref 80–100)
METHEMOGLOBIN VENOUS: 0.7 %
MICROCYTES: ABNORMAL
MONOCYTES ABSOLUTE: 0.6 K/UL (ref 0–1.3)
MONOCYTES RELATIVE PERCENT: 5 %
MYELOCYTE PERCENT: 1 %
NEUTROPHILS ABSOLUTE: 10 K/UL (ref 1.7–7.7)
NEUTROPHILS RELATIVE PERCENT: 83 %
O2 CONTENT, VEN: 5 ML/DL
O2 SAT, VEN: 35 %
O2 THERAPY: ABNORMAL
PCO2, VEN: 51.3 MMHG (ref 40–50)
PDW BLD-RTO: 19.7 % (ref 12.4–15.4)
PH VENOUS: 7.39 (ref 7.35–7.45)
PLATELET # BLD: 331 K/UL (ref 135–450)
PMV BLD AUTO: 8.7 FL (ref 5–10.5)
PO2, VEN: 23 MMHG
POTASSIUM REFLEX MAGNESIUM: 4.3 MMOL/L (ref 3.5–5.1)
PRO-BNP: 127 PG/ML (ref 0–449)
RAPID INFLUENZA  B AGN: NEGATIVE
RAPID INFLUENZA A AGN: NEGATIVE
RBC # BLD: 4.11 M/UL (ref 4–5.2)
REASON FOR REJECTION: NORMAL
REJECTED TEST: NORMAL
SLIDE REVIEW: ABNORMAL
SODIUM BLD-SCNC: 143 MMOL/L (ref 136–145)
TCO2 CALC VENOUS: 33 MMOL/L
TROPONIN: <0.01 NG/ML
WBC # BLD: 11.4 K/UL (ref 4–11)

## 2020-02-27 PROCEDURE — 93005 ELECTROCARDIOGRAM TRACING: CPT | Performed by: EMERGENCY MEDICINE

## 2020-02-27 PROCEDURE — 36415 COLL VENOUS BLD VENIPUNCTURE: CPT

## 2020-02-27 PROCEDURE — 71250 CT THORAX DX C-: CPT

## 2020-02-27 PROCEDURE — 84484 ASSAY OF TROPONIN QUANT: CPT

## 2020-02-27 PROCEDURE — 85025 COMPLETE CBC W/AUTO DIFF WBC: CPT

## 2020-02-27 PROCEDURE — 83880 ASSAY OF NATRIURETIC PEPTIDE: CPT

## 2020-02-27 PROCEDURE — 80048 BASIC METABOLIC PNL TOTAL CA: CPT

## 2020-02-27 PROCEDURE — 87804 INFLUENZA ASSAY W/OPTIC: CPT

## 2020-02-27 PROCEDURE — 82803 BLOOD GASES ANY COMBINATION: CPT

## 2020-02-27 PROCEDURE — 71045 X-RAY EXAM CHEST 1 VIEW: CPT

## 2020-02-27 PROCEDURE — 99285 EMERGENCY DEPT VISIT HI MDM: CPT

## 2020-02-27 PROCEDURE — 6370000000 HC RX 637 (ALT 250 FOR IP): Performed by: EMERGENCY MEDICINE

## 2020-02-27 RX ORDER — LEVOFLOXACIN 500 MG/1
500 TABLET, FILM COATED ORAL ONCE
Status: COMPLETED | OUTPATIENT
Start: 2020-02-27 | End: 2020-02-27

## 2020-02-27 RX ORDER — LEVOFLOXACIN 500 MG/1
500 TABLET, FILM COATED ORAL DAILY
Qty: 9 TABLET | Refills: 0 | Status: SHIPPED | OUTPATIENT
Start: 2020-02-27 | End: 2020-03-07

## 2020-02-27 RX ADMIN — LEVOFLOXACIN 500 MG: 500 TABLET, FILM COATED ORAL at 18:59

## 2020-02-27 ASSESSMENT — ENCOUNTER SYMPTOMS
SHORTNESS OF BREATH: 1
COUGH: 1
RHINORRHEA: 0
ABDOMINAL PAIN: 0
EYE DISCHARGE: 0
VOMITING: 0
DIARRHEA: 0
BACK PAIN: 0
SORE THROAT: 0
WHEEZING: 0
EYE PAIN: 0
NAUSEA: 0

## 2020-02-27 ASSESSMENT — PAIN SCALES - GENERAL
PAINLEVEL_OUTOF10: 0
PAINLEVEL_OUTOF10: 0

## 2020-02-27 NOTE — ED NOTES

## 2020-02-27 NOTE — ED PROVIDER NOTES
pain, joint swelling and neck pain. Skin: Negative for rash. Allergic/Immunologic: Negative for environmental allergies. Neurological: Negative for dizziness, seizures, syncope and headaches. Hematological: Negative for adenopathy. Psychiatric/Behavioral: Negative for dysphoric mood and suicidal ideas. The patient is not nervous/anxious. PAST MEDICAL HISTORY     Past Medical History:   Diagnosis Date    COPD (chronic obstructive pulmonary disease) ([address]     GERD (gastroesophageal reflux disease)     High cholesterol     Osteoporosis          SURGICAL HISTORY     Past Surgical History:   Procedure Laterality Date     SECTION      4 times    COLONOSCOPY  . redundant colon         CURRENTMEDICATIONS       Previous Medications    ALBUTEROL (PROVENTIL) (2.5 MG/3ML) 0.083% NEBULIZER SOLUTION    Take 3 mLs by nebulization 2 times daily    ALBUTEROL SULFATE  (90 BASE) MCG/ACT INHALER    Inhale 2 puffs into the lungs every 4 hours as needed for Wheezing or Shortness of Breath    ASPIRIN 81 MG TABLET    Take 81 mg by mouth every 48 hours as needed for Pain    ATORVASTATIN (LIPITOR) 20 MG TABLET    TAKE 1 TABLET EVERY DAY    BLOOD GLUCOSE MONITOR STRIPS    Test 1  time a day & as needed for symptoms of irregular blood glucose.     CALCIUM CARB-CHOLECALCIFEROL 600-500 MG-UNIT CAPS    Take 1 tablet by mouth 2 times daily    CYANOCOBALAMIN (VITAMIN B 12 PO)    Take 1 tablet by mouth daily    DOXYCYCLINE HYCLATE (VIBRA-TABS) 100 MG TABLET    Take 1 tablet by mouth 2 times daily for 10 days    FLUTICASONE-SALMETEROL (WIXELA INHUB) 500-50 MCG/DOSE DISKUS INHALER    INHALE 1 PUFF TWICE DAILY    GLUCOS-MSM-C-VA-KUMJZI-XWGADA (GLUCOSAMINE MSM COMPLEX PO)    Take 1 tablet by mouth 2 times daily    IPRATROPIUM-ALBUTEROL (DUONEB) 0.5-2.5 (3) MG/3ML SOLN NEBULIZER SOLUTION    Inhale 3 mLs into the lungs every 4 hours    LANCETS MISC    1 each by Does not apply route daily    LEVOTHYROXINE (SYNTHROID) 100 MCG TABLET    TAKE 1 TABLET EVERY DAY    METFORMIN (GLUCOPHAGE-XR) 500 MG EXTENDED RELEASE TABLET    Take 1 tablet by mouth daily (with breakfast)    MISC. DEVICES (ACAPELLA) MISC    1 Device by Does not apply route daily    MONTELUKAST (SINGULAIR) 10 MG TABLET    TAKE 1 TABLET NIGHTLY    OMEPRAZOLE (PRILOSEC) 40 MG DELAYED RELEASE CAPSULE    TAKE 1 CAPSULE EVERY DAY    PREDNISONE (DELTASONE) 10 MG TABLET    TAKE 1 TABLET EVERY DAY    PREDNISONE (DELTASONE) 20 MG TABLET    Take 40 mg daily for 5 days, then 20 mg daily for 5 days    PREDNISONE (DELTASONE) 20 MG TABLET    Take 40 mg daily for 5 days, then 20 mg daily for 5 days then stop    PROAIR  (90 BASE) MCG/ACT INHALER    INHALE TWO PUFFS BY MOUTH EVERY 4 HOURS AS NEEDED FOR WHEEZING    SALINE NASAL GEL (AYR) GEL    APPLY INSIDE BOTH NOSTRILS TWO TIMES A DAY    SODIUM CHLORIDE 3 % NEBULIZER SOLUTION    Take 15 mLs by nebulization as needed for Other or Cough    SPIRIVA HANDIHALER 18 MCG INHALATION CAPSULE    INHALE THE CONTENTS OF 1 CAPSULE DAILY    THEOPHYLLINE (RAFA-24) 400 MG EXTENDED RELEASE CAPSULE    Take 1 capsule by mouth daily       ALLERGIES     Sulfa antibiotics    FAMILY HISTORY       Family History   Problem Relation Age of Onset    Diabetes Sister         x's 3    Other Sister         cad    High Blood Pressure Mother     Diabetes Mother     Heart Attack Mother     Other Mother         hypothyroidism    High Blood Pressure Father     Heart Attack Father     Heart Attack Maternal Aunt           SOCIAL HISTORY       Social History     Socioeconomic History    Marital status:       Spouse name: None    Number of children: None    Years of education: None    Highest education level: None   Occupational History    None   Social Needs    Financial resource strain: Not hard at all   [employer] insecurity:     Worry: Never true     Inability: Never true   [employer] needs:     Medical: No     Non-medical: No Monocytes Absolute 0.6 0.0 - 1.3 K/uL    Eosinophils Absolute 0.0 0.0 - 0.6 K/uL    Basophils Absolute 0.0 0.0 - 0.2 K/uL    Bands Relative 4 0 - 7 %    Myelocyte Percent 1 (A) %    Anisocytosis 1+ (A)     Microcytes 1+ (A)     Hypochromia Occasional (A)    Basic Metabolic Panel w/ Reflex to MG   Result Value Ref Range    Sodium 143 136 - 145 mmol/L    Potassium reflex Magnesium 4.3 3.5 - 5.1 mmol/L    Chloride 104 99 - 110 mmol/L    CO2 24 21 - 32 mmol/L    Anion Gap 15 3 - 16    Glucose 167 (H) 70 - 99 mg/dL    BUN 17 7 - 20 mg/dL    CREATININE 0.5 (L) 0.6 - 1.2 mg/dL    GFR Non-African American >60 >60    GFR African American >60 >60    Calcium 9.5 8.3 - 10.6 mg/dL   Brain Natriuretic Peptide   Result Value Ref Range    Pro- 0 - 449 pg/mL   Troponin   Result Value Ref Range    Troponin <0.01 <0.01 ng/mL   SPECIMEN REJECTION   Result Value Ref Range    Rejected Test VBG     Reason for Rejection see below    Blood Gas, Venous   Result Value Ref Range    pH, Conor 7.391 7.350 - 7.450    pCO2, Conor 51.3 (H) 40.0 - 50.0 mmHg    pO2, Conor 23 Not Established mmHg    HCO3, Venous 31 (H) 23 - 29 mmol/L    Base Excess, Conor 5.1 Not Established mmol/L    O2 Sat, Conor 35 Not Established %    Carboxyhemoglobin 0.9 %    MetHgb, Conor 0.7 <1.5 %    TC02 (Calc), Conor 33 Not Established mmol/L    O2 Content, Conor 5 Not Established mL/dL    O2 Therapy Unknown        All other labs were within normal range or not returned as of this dictation. EKG: All EKG's are interpreted by the Emergency Department Physician who either signs orCo-signs this chart in the absence of a cardiologist.    EKG visualized preliminarily interpreted by myself showing sinus rhythm at a rate of 94. Axis is 39. Nonspecific ST findings in aVL V1 and V2 as well as somewhat in the inferior leads. Tremor artifact affects interpretation. No evidence of ST elevation or ischemic-looking depression.   Intervals are normal    RADIOLOGY:   Non-plain film images history. Follow-up following medical treatment course in 1 in 3 months recommended document complete resolution. Xr Chest Portable    Result Date: 2/27/2020  EXAMINATION: ONE XRAY VIEW OF THE CHEST 2/27/2020 12:50 pm COMPARISON: 10/08/2018 HISTORY: ORDERING SYSTEM PROVIDED HISTORY: Shortness of breath TECHNOLOGIST PROVIDED HISTORY: Reason for exam:->Shortness of breath Reason for Exam: very sob Acuity: Acute Type of Exam: Initial FINDINGS: Pulmonary vasculature is congested. Patchy bilateral lower lung airspace disease is present, greater on the right. No pneumothorax is found. No free air. No acute bony abnormality. Patchy bibasilar airspace disease, greater on the right, along with mild pulmonary vascular congestion. Early pulmonary edema would be primarily considered, but multifocal pneumonia also remains in the differential.  The findings should be followed to resolution. PROCEDURES   Unless otherwise noted below, none     Procedures    CRITICAL CARE TIME   N/A    CONSULTS:  IP CONSULT TO PRIMARY CARE PROVIDER    EMERGENCY DEPARTMENT COURSE and DIFFERENTIAL DIAGNOSIS/MDM:   Vitals:    Vitals:    02/27/20 1541 02/27/20 1631   BP: (!) 152/71 125/65   Pulse: 107 103   Resp: 24 23   Temp: 97.3 °F (36.3 °C)    SpO2: 94% 99%       Patient was given the following medications:  Medications   levoFLOXacin (LEVAQUIN) tablet 500 mg (has no administration in time range)       She is been quite stable. She actually has not required any breathing treatments here. Case was discussed with her primary care provider. I offered admission to the patient but she really wants to try to go home. I am giving her the first dose of Levaquin now and she can fill a prescription for tomorrow. Start her if she gets in trouble to call 911 and to please call Dr. Del Nguyen tomorrow. FINAL IMPRESSION      1. COPD exacerbation ([address]    2.  Atypical pneumonia          DISPOSITION/PLAN    DISPOSITION Decision To

## 2020-02-27 NOTE — ED NOTES
Bed: C-28  Expected date: 2/27/20  Expected time:   Means of arrival:   Comments:  Hold for Isabell 1981.  Shaun Aquino RN  02/27/20 1494

## 2020-02-28 ENCOUNTER — TELEPHONE (OUTPATIENT)
Dept: PULMONOLOGY | Age: 80
End: 2020-02-28

## 2020-02-28 LAB
EKG ATRIAL RATE: 94 BPM
EKG DIAGNOSIS: NORMAL
EKG P AXIS: 79 DEGREES
EKG P-R INTERVAL: 116 MS
EKG Q-T INTERVAL: 326 MS
EKG QRS DURATION: 82 MS
EKG QTC CALCULATION (BAZETT): 407 MS
EKG R AXIS: 39 DEGREES
EKG T AXIS: 70 DEGREES
EKG VENTRICULAR RATE: 94 BPM

## 2020-02-28 PROCEDURE — 93010 ELECTROCARDIOGRAM REPORT: CPT | Performed by: INTERNAL MEDICINE

## 2020-03-04 ENCOUNTER — OFFICE VISIT (OUTPATIENT)
Dept: PULMONOLOGY | Age: 80
End: 2020-03-04
Payer: MEDICARE

## 2020-03-04 VITALS
HEART RATE: 101 BPM | HEIGHT: 63 IN | RESPIRATION RATE: 18 BRPM | SYSTOLIC BLOOD PRESSURE: 122 MMHG | OXYGEN SATURATION: 91 % | TEMPERATURE: 97.6 F | DIASTOLIC BLOOD PRESSURE: 64 MMHG | BODY MASS INDEX: 23.74 KG/M2 | WEIGHT: 134 LBS

## 2020-03-04 DIAGNOSIS — J43.2 CENTRILOBULAR EMPHYSEMA (HCC): ICD-10-CM

## 2020-03-04 PROCEDURE — 99214 OFFICE O/P EST MOD 30 MIN: CPT | Performed by: INTERNAL MEDICINE

## 2020-03-04 PROCEDURE — G8482 FLU IMMUNIZE ORDER/ADMIN: HCPCS | Performed by: INTERNAL MEDICINE

## 2020-03-04 PROCEDURE — 1036F TOBACCO NON-USER: CPT | Performed by: INTERNAL MEDICINE

## 2020-03-04 PROCEDURE — G8427 DOCREV CUR MEDS BY ELIG CLIN: HCPCS | Performed by: INTERNAL MEDICINE

## 2020-03-04 PROCEDURE — 1123F ACP DISCUSS/DSCN MKR DOCD: CPT | Performed by: INTERNAL MEDICINE

## 2020-03-04 PROCEDURE — 3023F SPIROM DOC REV: CPT | Performed by: INTERNAL MEDICINE

## 2020-03-04 PROCEDURE — 4040F PNEUMOC VAC/ADMIN/RCVD: CPT | Performed by: INTERNAL MEDICINE

## 2020-03-04 PROCEDURE — 1090F PRES/ABSN URINE INCON ASSESS: CPT | Performed by: INTERNAL MEDICINE

## 2020-03-04 PROCEDURE — G8399 PT W/DXA RESULTS DOCUMENT: HCPCS | Performed by: INTERNAL MEDICINE

## 2020-03-04 PROCEDURE — G8926 SPIRO NO PERF OR DOC: HCPCS | Performed by: INTERNAL MEDICINE

## 2020-03-04 PROCEDURE — G8420 CALC BMI NORM PARAMETERS: HCPCS | Performed by: INTERNAL MEDICINE

## 2020-03-04 NOTE — PROGRESS NOTES
activity: Not on file   Other Topics Concern    Not on file   Social History Narrative    Not on file       Immunization History   Administered Date(s) Administered    Influenza Nasal 11/01/2014    Influenza Vaccine, unspecified formulation 10/29/2014, 10/12/2015, 11/15/2016    Influenza Virus Vaccine 10/01/2008, 11/01/2014    Influenza Whole 10/01/2008    Influenza, High Dose (Fluzone 65 yrs and older) 10/12/2015, 11/15/2016, 10/19/2018, 10/28/2019    Influenza, Lefor Kallman, Recombinant, IM PF (Flublok 18 yrs and older) 10/28/2019    Pneumococcal Conjugate 13-valent ([id number]) 04/15/2016    Pneumococcal Conjugate 7-valent (Prevnar7) 11/01/2005, 10/11/2010    Pneumococcal Polysaccharide ([id number]) 11/01/2005, 03/10/2017       ROS:  GENERAL:  No fevers, no chills, +6lb weight loss in 2 months  RESPIRATORY:  +exertional shortness of breath, +cough      PHYSICAL EXAM:  Vitals:    03/04/20 0937 03/04/20 1007   BP: (!) 118/58 122/64   Site: Left Upper Arm    Position: Sitting    Cuff Size: Medium Adult    Pulse: 101    Resp: 18    Temp: 97.6 °F (36.4 °C)    TempSrc: Oral    SpO2: 91%    Weight: 134 lb (60.8 kg)    Height: 5' 3\" (1.6 m)    on 2L NC    Gen: Well developed; well nourished  Eyes: No scleral icterus. No conjunctival injection. ENT:  Oropharynx clear. External appearance of ears and nose normal.  Neck: Trachea midline. No obvious mass. No visible thyroid enlargement    Respiratory: Clear to auscultation bilaterally, no accessory muscle use  Cardiovascular: Regular rate and rhythm, no appreciable murmurs. No edema. Gastrointestinal: Soft, non-tender. No hernia  Skin: Warm and dry. No rashes or ulcers on visible areas. Normal texture and turgor  Lymphatic: No cervical LAD. No supraclavicular LAD. Musculoskeletal: No cyanosis, clubbing or joint deformity.     Psychiatric: Normal mood and affect; exhibits normal insight and judgement     Data reviewed:  Pulmonary Function Testing (2/8/19)  FVC 2.26 L at 93% predicted ---> 2.28L at 94% predicted  FEV1 1.05 L at 56% predicted -----> 1.14L at 61% predicted  FEV1/FVC ratio at 46%----> 50%  TLC 4.64 L at 95% predicted  VC 2.41L at 99% predicted  ERV 0.86L at 106% predicted  RV/TLC at 48% predicted  DLCO 4.37 at 19% predicted  DLCO/VA 1.19L at 25 % predicted    Overall: Moderate lower airflow obstruction without significant reversal; lung volumes are normal; diffusing capacity is severely reduced and does not normalize when averaged over lung volumes (compared to the study in June 2016 flow measurements and vital capacity have significantly declined, other findings have not significantly changed)    Pulmonary Function Testing (6/20/16)  FVC 2.87 L at 108% predicted ---> 2.92L at 110% predicted  FEV1 131 L at 66% predicted ----> 1.36L at 69% predicted  FEV1/FVC ratio at 46% ----> 47% predicted  TLC 4.55 L at 92% predicted  VC 2.87L at 109% predicted  ERV 1.27L at 146% predicted  RV/TLC at 37% predicted  DLCO 4.3 at 22% predicted  DLCO/VA 1.33L at 38 % predicted      Overall: Moderate lower airflow obstruction without significant reversal; normal lung volumes; severe reduction in diffusing capacity that does not normalize when averaged over lung volumes (compared to the study in 2013 diffusing capacity has declined, otherwise there has been no significant change)          Six minute walk test:  10/23/18- Walked 244m, 61% predicted (reduced); iwona saturation 86% on RA---> 90% on 8L NC  2/19/19- Walked 244m, 62% predicted (reduced); 90% on 8L NC      Images and reports of chest imaging were reviewed by me. My interpretation is:  CXR (2/27/20): Infiltrate in the right lower lobe; hyperinflated chest  Chest CT (4/26/18):  Prominent mediastinal nodes; centrilobular emphysema; nodular density in the minor fissure (unchanged compared to 4/2017); amorphous opacity in the left upper lobe (increased compared to 4/2107); RLL nodule (unchanged compared to 4/2017); nodular density in the LLL (stable compared to 4/2017)  Chest CT (7/25/18): No LAD; centrilobular emphysema; nodular density in the right minor fissure and right lower lobe (unchanged compared to April 2017); left lower lobe nodule (unchanged compared to April 2017); left apical nodular density has resolved compared to April 2018  Chest CT (2/27/20): No LAD; centrilobular emphysema; consolidation in the right middle lobe, right lower lobe and lingula; infiltrate in the left lower lobe and left upper lobe    ECHO (8/17/18)  Summary   Normal LV size and systolic function: EF is    60%.  The left atrium is normal in size.   Right ventricular systolic function is normal.   Pericardial fat pad vs Trivial pericardial effusion    Lab Results   Component Value Date    WBC 11.4 (H) 02/27/2020    HGB 11.2 (L) 02/27/2020    HCT 34.7 (L) 02/27/2020    MCV 84.4 02/27/2020     02/27/2020       No results found for: BNP    Lab Results   Component Value Date    CREATININE 0.5 (L) 02/27/2020    BUN 17 02/27/2020     02/27/2020    K 4.3 02/27/2020     02/27/2020    CO2 24 02/27/2020   No allergies  IgE- 6      Assessment/Plan:[de-identified]y.o. year old female presents for follow up. COPD- She has moderate lower airflow obstruction on PFTs. Continue Wixela twice daily and Spiriva daily. Continue DuoNebs and 3% saline nebulizers as needed. Continue prednisone 10 mg daily with omeprazole, calcium and vitamin D. Will check alpha-1 antitrypsin. She has completed pulmonary rehabilitation. Multifocal pneumonia- She will complete the course of Levaquin as prescribed. I have asked her to obtain a sputum culture. Will repeat chest CT in May 2020 (3-month follow-up). Chronic hypoxic respiratory failure- She will continue supplemental oxygen continuously. She is to return for follow-up in 6 weeks.        Irma Craig MD  [place] [facility], Critical Care and Sleep

## 2020-03-04 NOTE — PATIENT INSTRUCTIONS
Please give a sample of your mucus  Please obtain blood test    Please come for follow-up in 4 weeks

## 2020-03-05 DIAGNOSIS — J18.9 MULTIFOCAL PNEUMONIA: ICD-10-CM

## 2020-03-07 LAB
CULTURE, RESPIRATORY: NORMAL
GRAM STAIN RESULT: NORMAL

## 2020-03-09 LAB
ALPHA-1 ANTITRYPSIN PHENOTYPE: NORMAL
ALPHA-1 ANTITRYPSIN: 171 MG/DL (ref 90–200)

## 2020-03-25 RX ORDER — ATORVASTATIN CALCIUM 20 MG/1
TABLET, FILM COATED ORAL
Qty: 90 TABLET | Refills: 1 | Status: SHIPPED | OUTPATIENT
Start: 2020-03-25 | End: 2020-10-21

## 2020-03-25 RX ORDER — OMEPRAZOLE 40 MG/1
CAPSULE, DELAYED RELEASE ORAL
Qty: 90 CAPSULE | Refills: 1 | Status: SHIPPED | OUTPATIENT
Start: 2020-03-25 | End: 2021-02-10

## 2020-03-25 NOTE — TELEPHONE ENCOUNTER
Last office visit:02/12/2020      Future Appointments   Date Time Provider Omar Barros   4/30/2020 10:00 AM Kael Hodge  [facility]

## 2020-04-14 ENCOUNTER — TELEPHONE (OUTPATIENT)
Dept: PULMONOLOGY | Age: 80
End: 2020-04-14

## 2020-04-14 RX ORDER — PREDNISONE 20 MG/1
TABLET ORAL
Qty: 30 TABLET | Refills: 0 | Status: SHIPPED | OUTPATIENT
Start: 2020-04-14 | End: 2020-06-12

## 2020-04-14 NOTE — TELEPHONE ENCOUNTER
Patient said that her shortness of breath has been more severe since February when she had pneumonia. She said that the shortness of breath is when she gets up walking around. Currently on 8 lpm at rest.   Her pulse ox stays 89-90  Feels that her inhalers are helping - 3 hours after taking she can't breath. Has Presdnisone 10 mg sometimes takes an extra pill .

## 2020-05-08 ENCOUNTER — TELEPHONE (OUTPATIENT)
Dept: PULMONOLOGY | Age: 80
End: 2020-05-08

## 2020-05-15 ENCOUNTER — TELEPHONE (OUTPATIENT)
Dept: PULMONOLOGY | Age: 80
End: 2020-05-15

## 2020-05-15 RX ORDER — PREDNISONE 20 MG/1
TABLET ORAL
Qty: 18 TABLET | Refills: 0 | Status: SHIPPED | OUTPATIENT
Start: 2020-05-15 | End: 2020-06-12

## 2020-05-15 RX ORDER — CIPROFLOXACIN 500 MG/1
500 TABLET, FILM COATED ORAL 2 TIMES DAILY
Qty: 20 TABLET | Refills: 0 | Status: SHIPPED | OUTPATIENT
Start: 2020-05-15 | End: 2020-05-25

## 2020-05-20 RX ORDER — TIOTROPIUM BROMIDE 18 UG/1
CAPSULE ORAL; RESPIRATORY (INHALATION)
Qty: 90 CAPSULE | Refills: 0 | Status: SHIPPED | OUTPATIENT
Start: 2020-05-20 | End: 2020-08-19

## 2020-06-12 ENCOUNTER — OFFICE VISIT (OUTPATIENT)
Dept: INTERNAL MEDICINE CLINIC | Age: 80
End: 2020-06-12
Payer: MEDICARE

## 2020-06-12 VITALS
HEART RATE: 110 BPM | TEMPERATURE: 97.3 F | WEIGHT: 138.4 LBS | HEIGHT: 63 IN | BODY MASS INDEX: 24.52 KG/M2 | RESPIRATION RATE: 22 BRPM | DIASTOLIC BLOOD PRESSURE: 70 MMHG | OXYGEN SATURATION: 90 % | SYSTOLIC BLOOD PRESSURE: 126 MMHG

## 2020-06-12 PROBLEM — G62.9 PERIPHERAL NEUROPATHY: Status: ACTIVE | Noted: 2020-06-12

## 2020-06-12 LAB — HBA1C MFR BLD: 6.9 %

## 2020-06-12 PROCEDURE — 83036 HEMOGLOBIN GLYCOSYLATED A1C: CPT | Performed by: INTERNAL MEDICINE

## 2020-06-12 PROCEDURE — 3023F SPIROM DOC REV: CPT | Performed by: INTERNAL MEDICINE

## 2020-06-12 PROCEDURE — G8427 DOCREV CUR MEDS BY ELIG CLIN: HCPCS | Performed by: INTERNAL MEDICINE

## 2020-06-12 PROCEDURE — 4040F PNEUMOC VAC/ADMIN/RCVD: CPT | Performed by: INTERNAL MEDICINE

## 2020-06-12 PROCEDURE — 99214 OFFICE O/P EST MOD 30 MIN: CPT | Performed by: INTERNAL MEDICINE

## 2020-06-12 PROCEDURE — 1036F TOBACCO NON-USER: CPT | Performed by: INTERNAL MEDICINE

## 2020-06-12 PROCEDURE — G8399 PT W/DXA RESULTS DOCUMENT: HCPCS | Performed by: INTERNAL MEDICINE

## 2020-06-12 PROCEDURE — 1090F PRES/ABSN URINE INCON ASSESS: CPT | Performed by: INTERNAL MEDICINE

## 2020-06-12 PROCEDURE — 1123F ACP DISCUSS/DSCN MKR DOCD: CPT | Performed by: INTERNAL MEDICINE

## 2020-06-12 PROCEDURE — G8926 SPIRO NO PERF OR DOC: HCPCS | Performed by: INTERNAL MEDICINE

## 2020-06-12 PROCEDURE — G8420 CALC BMI NORM PARAMETERS: HCPCS | Performed by: INTERNAL MEDICINE

## 2020-06-12 ASSESSMENT — ENCOUNTER SYMPTOMS
SHORTNESS OF BREATH: 1
CHEST TIGHTNESS: 0
WHEEZING: 0
COUGH: 1

## 2020-06-12 ASSESSMENT — PATIENT HEALTH QUESTIONNAIRE - PHQ9
SUM OF ALL RESPONSES TO PHQ QUESTIONS 1-9: 0
SUM OF ALL RESPONSES TO PHQ9 QUESTIONS 1 & 2: 0
1. LITTLE INTEREST OR PLEASURE IN DOING THINGS: 0
SUM OF ALL RESPONSES TO PHQ QUESTIONS 1-9: 0
2. FEELING DOWN, DEPRESSED OR HOPELESS: 0

## 2020-07-01 NOTE — TELEPHONE ENCOUNTER
Last office visit:06/12/2020    Future Appointments   Date Time Provider Omar Fiorella   7/20/2020  2:20 PM Soledad Watts MD [facility]   9/15/2020 12:45 PM Rufino Rhoades MD [facility]

## 2020-07-20 ENCOUNTER — HOSPITAL ENCOUNTER (OUTPATIENT)
Age: 80
Discharge: HOME | End: 2020-07-20
Payer: MEDICARE

## 2020-07-20 ENCOUNTER — OFFICE VISIT (OUTPATIENT)
Dept: PULMONOLOGY | Age: 80
End: 2020-07-20
Payer: MEDICARE

## 2020-07-20 ENCOUNTER — HOSPITAL ENCOUNTER (OUTPATIENT)
Dept: GENERAL RADIOLOGY | Age: 80
Discharge: HOME | End: 2020-07-20
Payer: MEDICARE

## 2020-07-20 VITALS
HEIGHT: 63 IN | TEMPERATURE: 98.3 F | WEIGHT: 139 LBS | BODY MASS INDEX: 24.63 KG/M2 | SYSTOLIC BLOOD PRESSURE: 118 MMHG | HEART RATE: 114 BPM | OXYGEN SATURATION: 95 % | RESPIRATION RATE: 20 BRPM | DIASTOLIC BLOOD PRESSURE: 62 MMHG

## 2020-07-20 DIAGNOSIS — J44.9 COPD, SEVERE (HCC): ICD-10-CM

## 2020-07-20 DIAGNOSIS — R06.02 SOB (SHORTNESS OF BREATH): ICD-10-CM

## 2020-07-20 LAB
BASOPHILS ABSOLUTE: 0.1 K/UL (ref 0–0.2)
BASOPHILS RELATIVE PERCENT: 1.1 %
EOSINOPHILS ABSOLUTE: 0.1 K/UL (ref 0–0.6)
EOSINOPHILS RELATIVE PERCENT: 0.6 %
HCT VFR BLD CALC: 35.4 % (ref 36–48)
HEMOGLOBIN: 11.1 G/DL (ref 12–16)
LYMPHOCYTES ABSOLUTE: 0.9 K/UL (ref 1–5.1)
LYMPHOCYTES RELATIVE PERCENT: 8.2 %
MCH RBC QN AUTO: 26.4 PG (ref 26–34)
MCHC RBC AUTO-ENTMCNC: 31.3 G/DL (ref 31–36)
MCV RBC AUTO: 84.3 FL (ref 80–100)
MONOCYTES ABSOLUTE: 0.5 K/UL (ref 0–1.3)
MONOCYTES RELATIVE PERCENT: 4.7 %
NEUTROPHILS ABSOLUTE: 9.4 K/UL (ref 1.7–7.7)
NEUTROPHILS RELATIVE PERCENT: 85.4 %
PDW BLD-RTO: 19.2 % (ref 12.4–15.4)
PLATELET # BLD: 299 K/UL (ref 135–450)
PMV BLD AUTO: 9.4 FL (ref 5–10.5)
RBC # BLD: 4.2 M/UL (ref 4–5.2)
WBC # BLD: 11 K/UL (ref 4–11)

## 2020-07-20 PROCEDURE — 99214 OFFICE O/P EST MOD 30 MIN: CPT | Performed by: INTERNAL MEDICINE

## 2020-07-20 PROCEDURE — 1036F TOBACCO NON-USER: CPT | Performed by: INTERNAL MEDICINE

## 2020-07-20 PROCEDURE — G8926 SPIRO NO PERF OR DOC: HCPCS | Performed by: INTERNAL MEDICINE

## 2020-07-20 PROCEDURE — 4040F PNEUMOC VAC/ADMIN/RCVD: CPT | Performed by: INTERNAL MEDICINE

## 2020-07-20 PROCEDURE — G8427 DOCREV CUR MEDS BY ELIG CLIN: HCPCS | Performed by: INTERNAL MEDICINE

## 2020-07-20 PROCEDURE — 1123F ACP DISCUSS/DSCN MKR DOCD: CPT | Performed by: INTERNAL MEDICINE

## 2020-07-20 PROCEDURE — 1090F PRES/ABSN URINE INCON ASSESS: CPT | Performed by: INTERNAL MEDICINE

## 2020-07-20 PROCEDURE — 3023F SPIROM DOC REV: CPT | Performed by: INTERNAL MEDICINE

## 2020-07-20 PROCEDURE — 71046 X-RAY EXAM CHEST 2 VIEWS: CPT

## 2020-07-20 PROCEDURE — G8399 PT W/DXA RESULTS DOCUMENT: HCPCS | Performed by: INTERNAL MEDICINE

## 2020-07-20 PROCEDURE — G8420 CALC BMI NORM PARAMETERS: HCPCS | Performed by: INTERNAL MEDICINE

## 2020-07-20 NOTE — PROGRESS NOTES
Chief complaint  This is a [de-identified]y.o. year old female  who presents with a chief complaint of   Chief Complaint   Patient presents with    COPD     f/u emphysema       HPI  50-year-old woman with moderate COPD (FEV1 1.36 L, 61% predicted), shortness of breath, multifocal pneumonia, chronic hypoxic respiratory failure and Mycobacterium avium complex infection (completed 18 months of therapy) presents for follow-up. She feels her shortness of breath has been worsening over the past several months. She is short of breath with any activity. Her breathing improves when she is at rest.  She has a chronic cough with yellow mucus. She is compliant with Wixela and Spiriva. She uses DuoNebs 3 times a day and 3% saline nebulizers as needed. She is taking theophylline 200 mg daily and prednisone 10 mg daily. She uses 10 L with her home oxygen concentrator and 5 L with her portable oxygen concentrator. She completed pulmonary rehabilitation. Past Medical History:   Diagnosis Date    COPD (chronic obstructive pulmonary disease) ([address]     GERD (gastroesophageal reflux disease)     High cholesterol     Osteoporosis        Past Surgical History:   Procedure Laterality Date     SECTION      4 times    COLONOSCOPY  .     redundant colon       Current Outpatient Medications   Medication Sig Dispense Refill    ipratropium-albuterol (DUONEB) 0.5-2.5 (3) MG/3ML SOLN nebulizer solution INHALE THREE MILLILITERS VIA NEBULIZATION BY MOUTH EVERY 4 HOURS 540 mL 3    fluticasone-salmeterol (WIXELA INHUB) 500-50 MCG/DOSE diskus inhaler INHALE 1 PUFF TWICE DAILY 1 Inhaler 5    SPIRIVA HANDIHALER 18 MCG inhalation capsule INHALE THE CONTENTS OF 1 CAPSULE DAILY 90 capsule 0    theophylline (RAFA-24) 200 MG extended release capsule Take 1 capsule by mouth daily 30 capsule 3    predniSONE (DELTASONE) 10 MG tablet TAKE 1 TABLET EVERY DAY 90 tablet 3    omeprazole (PRILOSEC) 40 MG delayed release capsule TAKE 1 CAPSULE EVERY DAY 90 capsule 1    atorvastatin (LIPITOR) 20 MG tablet TAKE 1 TABLET EVERY DAY 90 tablet 1    albuterol sulfate  (90 Base) MCG/ACT inhaler Inhale 2 puffs into the lungs every 4 hours as needed for Wheezing or Shortness of Breath 1 Inhaler 3    metFORMIN (GLUCOPHAGE-XR) 500 MG extended release tablet Take 1 tablet by mouth daily (with breakfast) 90 tablet 1    Cyanocobalamin (VITAMIN B 12 PO) Take 1 tablet by mouth daily      Glucos-MSM-C-Se-Tcfskp-Xfspnt (GLUCOSAMINE MSM COMPLEX PO) Take 1 tablet by mouth 2 times daily      levothyroxine (SYNTHROID) 100 MCG tablet TAKE 1 TABLET EVERY DAY 90 tablet 0    Lancets MISC 1 each by Does not apply route daily 100 each 3    blood glucose monitor strips Test 1  time a day & as needed for symptoms of irregular blood glucose. 100 strip 3    montelukast (SINGULAIR) 10 MG tablet TAKE 1 TABLET NIGHTLY 90 tablet 3    aspirin 81 MG tablet Take 81 mg by mouth every 48 hours as needed for Pain      Calcium Carb-Cholecalciferol 600-500 MG-UNIT CAPS Take 1 tablet by mouth 2 times daily 1 capsule 0    albuterol (PROVENTIL) (2.5 MG/3ML) 0.083% nebulizer solution Take 3 mLs by nebulization 2 times daily 180 mL 11    saline nasal gel (AYR) GEL APPLY INSIDE BOTH NOSTRILS TWO TIMES A DAY 1 Tube 2    sodium chloride 3 % nebulizer solution Take 15 mLs by nebulization as needed for Other or Cough 300 mL 1    Misc. Devices (ACAPELLA) MISC 1 Device by Does not apply route daily (Patient not taking: Reported on 7/20/2020) 1 each 0     No current facility-administered medications for this visit.         Allergies   Allergen Reactions    Sulfa Antibiotics        Family History   Problem Relation Age of Onset    Diabetes Sister         x's 3    Other Sister         cad    High Blood Pressure Mother     Diabetes Mother     Heart Attack Mother     Other Mother         hypothyroidism    High Blood Pressure Father     Heart Attack Father     Heart Attack Maternal Aunt volumes (compared to the study in June 2016 flow measurements and vital capacity have significantly declined, other findings have not significantly changed)          Six minute walk test:  10/23/18- Walked 244m, 61% predicted (reduced); iwona saturation 86% on RA---> 90% on 8L NC  2/19/19- Walked 244m, 62% predicted (reduced); 90% on 8L NC      Images and reports of chest imaging were reviewed by me. My interpretation is:  CXR (2/27/20): Infiltrate in the right lower lobe; hyperinflated chest  Chest CT (4/26/18): Prominent mediastinal nodes; centrilobular emphysema; nodular density in the minor fissure (unchanged compared to 4/2017); amorphous opacity in the left upper lobe (increased compared to 4/2107); RLL nodule (unchanged compared to 4/2017); nodular density in the LLL (stable compared to 4/2017)  Chest CT (7/25/18): No LAD; centrilobular emphysema; nodular density in the right minor fissure and right lower lobe (unchanged compared to April 2017); left lower lobe nodule (unchanged compared to April 2017); left apical nodular density has resolved compared to April 2018  Chest CT (2/27/20): No LAD; centrilobular emphysema; consolidation in the right middle lobe, right lower lobe and lingula; infiltrate in the left lower lobe and left upper lobe    ECHO (8/17/18)  Summary   Normal LV size and systolic function: EF is    60%.    The left atrium is normal in size.   Right ventricular systolic function is normal.   Pericardial fat pad vs Trivial pericardial effusion    Lab Results   Component Value Date    WBC 11.4 (H) 02/27/2020    HGB 11.2 (L) 02/27/2020    HCT 34.7 (L) 02/27/2020    MCV 84.4 02/27/2020     02/27/2020       No results found for: BNP    Lab Results   Component Value Date    CREATININE 0.5 (L) 02/27/2020    BUN 17 02/27/2020     02/27/2020    K 4.3 02/27/2020     02/27/2020    CO2 24 02/27/2020   No allergies  IgE- 6  Alpha 1 antitrypsin (3/4/20): M1M2- 171 (normal)      Assessment/Plan:79 y.o. year old female presents for follow up. COPD- Continue Wixela twice daily and Spiriva daily. Continue DuoNebs and 3% saline nebulizers as needed. Continue prednisone 10 mg daily with omeprazole, calcium and vitamin D. Continue theophylline 200 mg daily. Check theophylline level. She has completed pulmonary rehabilitation. Shortness of breath- May be due to progressive COPD, but will obtain echocardiogram to rule out pulmonary hypertension as well as CBC and BNP. Multifocal pneumonia- Will obtain chest x-ray. Chronic hypoxic respiratory failure- She will continue supplemental oxygen continuously. She knows to ensure that her oxygen saturation is at least 90%. We discussed switching the portable oxygen concentrator to oxygen tanks which can go higher than 5 L, but she says she would not be able to carry a large oxygen tank. She is to return for follow-up in 2 months.       Brenda Roldan MD  [place] [facility], Critical Care and Sleep

## 2020-07-21 ENCOUNTER — TELEPHONE (OUTPATIENT)
Dept: PULMONOLOGY | Age: 80
End: 2020-07-21

## 2020-07-21 LAB
PRO-BNP: 75 PG/ML (ref 0–449)
THEOPHYLLINE LEVEL: <0.8 UG/ML (ref 8–20)

## 2020-07-22 RX ORDER — LEVOTHYROXINE SODIUM 0.1 MG/1
TABLET ORAL
Qty: 90 TABLET | Refills: 1 | Status: SHIPPED | OUTPATIENT
Start: 2020-07-22 | End: 2021-01-28 | Stop reason: SDUPTHER

## 2020-07-22 RX ORDER — METFORMIN HYDROCHLORIDE 500 MG/1
TABLET, EXTENDED RELEASE ORAL
Qty: 90 TABLET | Refills: 1 | Status: SHIPPED | OUTPATIENT
Start: 2020-07-22 | End: 2021-01-28 | Stop reason: SDUPTHER

## 2020-08-05 ENCOUNTER — OFFICE VISIT (OUTPATIENT)
Dept: INTERNAL MEDICINE CLINIC | Age: 80
End: 2020-08-05
Payer: MEDICARE

## 2020-08-05 VITALS
TEMPERATURE: 97.6 F | BODY MASS INDEX: 24.63 KG/M2 | HEIGHT: 63 IN | RESPIRATION RATE: 16 BRPM | HEART RATE: 114 BPM | DIASTOLIC BLOOD PRESSURE: 64 MMHG | SYSTOLIC BLOOD PRESSURE: 126 MMHG | WEIGHT: 139 LBS | OXYGEN SATURATION: 98 %

## 2020-08-05 PROCEDURE — 1036F TOBACCO NON-USER: CPT | Performed by: INTERNAL MEDICINE

## 2020-08-05 PROCEDURE — 1123F ACP DISCUSS/DSCN MKR DOCD: CPT | Performed by: INTERNAL MEDICINE

## 2020-08-05 PROCEDURE — G8399 PT W/DXA RESULTS DOCUMENT: HCPCS | Performed by: INTERNAL MEDICINE

## 2020-08-05 PROCEDURE — 90714 TD VACC NO PRESV 7 YRS+ IM: CPT | Performed by: INTERNAL MEDICINE

## 2020-08-05 PROCEDURE — G8420 CALC BMI NORM PARAMETERS: HCPCS | Performed by: INTERNAL MEDICINE

## 2020-08-05 PROCEDURE — 90471 IMMUNIZATION ADMIN: CPT | Performed by: INTERNAL MEDICINE

## 2020-08-05 PROCEDURE — 4040F PNEUMOC VAC/ADMIN/RCVD: CPT | Performed by: INTERNAL MEDICINE

## 2020-08-05 PROCEDURE — 99213 OFFICE O/P EST LOW 20 MIN: CPT | Performed by: INTERNAL MEDICINE

## 2020-08-05 PROCEDURE — 1090F PRES/ABSN URINE INCON ASSESS: CPT | Performed by: INTERNAL MEDICINE

## 2020-08-05 PROCEDURE — 3023F SPIROM DOC REV: CPT | Performed by: INTERNAL MEDICINE

## 2020-08-05 PROCEDURE — G8427 DOCREV CUR MEDS BY ELIG CLIN: HCPCS | Performed by: INTERNAL MEDICINE

## 2020-08-05 PROCEDURE — G8926 SPIRO NO PERF OR DOC: HCPCS | Performed by: INTERNAL MEDICINE

## 2020-08-05 RX ORDER — CLINDAMYCIN HYDROCHLORIDE 300 MG/1
300 CAPSULE ORAL 3 TIMES DAILY
Qty: 30 CAPSULE | Refills: 0 | Status: SHIPPED | OUTPATIENT
Start: 2020-08-05 | End: 2020-08-15

## 2020-08-05 NOTE — PATIENT INSTRUCTIONS
Need TD vaccine   Start on clindamycin and take 3 times daily x 7-10 days and call if not getting better    Call if not getting better  Advised to use oxygen from oxygen cylinder only as portable machine is not giving her enough oxygen as when she came in here today with oxygen she came with,her oxygen was only 73% with symptoms  Symptoms resolved with oxygen from our office at 9 LPM and oxygen is 98%

## 2020-08-19 RX ORDER — TIOTROPIUM BROMIDE 18 UG/1
CAPSULE ORAL; RESPIRATORY (INHALATION)
Qty: 90 CAPSULE | Refills: 1 | Status: SHIPPED | OUTPATIENT
Start: 2020-08-19 | End: 2021-02-10

## 2020-09-02 ENCOUNTER — HOSPITAL ENCOUNTER (OUTPATIENT)
Dept: NON INVASIVE DIAGNOSTICS | Age: 80
Discharge: HOME | End: 2020-09-02
Payer: MEDICARE

## 2020-09-02 LAB
LV EF: 58 %
LVEF MODALITY: NORMAL

## 2020-09-02 PROCEDURE — 93306 TTE W/DOPPLER COMPLETE: CPT

## 2020-09-09 RX ORDER — MONTELUKAST SODIUM 10 MG/1
TABLET ORAL
Qty: 90 TABLET | Refills: 3 | Status: SHIPPED | OUTPATIENT
Start: 2020-09-09 | End: 2021-07-21

## 2020-09-09 NOTE — TELEPHONE ENCOUNTER
LAST OV 8/5/2020  Future Appointments   Date Time Provider Omar Barros   9/15/2020 12:45 PM Jose Espinosa MD [facility]   9/21/2020  1:20 PM Lacy Olson MD [facility]

## 2020-09-15 ENCOUNTER — OFFICE VISIT (OUTPATIENT)
Dept: INTERNAL MEDICINE CLINIC | Age: 80
End: 2020-09-15
Payer: MEDICARE

## 2020-09-15 VITALS
DIASTOLIC BLOOD PRESSURE: 68 MMHG | WEIGHT: 137.6 LBS | BODY MASS INDEX: 24.37 KG/M2 | RESPIRATION RATE: 18 BRPM | OXYGEN SATURATION: 92 % | SYSTOLIC BLOOD PRESSURE: 120 MMHG | HEART RATE: 104 BPM | TEMPERATURE: 98.2 F

## 2020-09-15 PROBLEM — L03.116 CELLULITIS OF LEFT LEG: Status: RESOLVED | Noted: 2019-10-14 | Resolved: 2020-09-15

## 2020-09-15 PROBLEM — M77.8 LEFT SHOULDER TENDONITIS: Status: ACTIVE | Noted: 2020-09-15

## 2020-09-15 PROCEDURE — G0008 ADMIN INFLUENZA VIRUS VAC: HCPCS | Performed by: INTERNAL MEDICINE

## 2020-09-15 PROCEDURE — G8926 SPIRO NO PERF OR DOC: HCPCS | Performed by: INTERNAL MEDICINE

## 2020-09-15 PROCEDURE — 1090F PRES/ABSN URINE INCON ASSESS: CPT | Performed by: INTERNAL MEDICINE

## 2020-09-15 PROCEDURE — 4040F PNEUMOC VAC/ADMIN/RCVD: CPT | Performed by: INTERNAL MEDICINE

## 2020-09-15 PROCEDURE — 1036F TOBACCO NON-USER: CPT | Performed by: INTERNAL MEDICINE

## 2020-09-15 PROCEDURE — 90694 VACC AIIV4 NO PRSRV 0.5ML IM: CPT | Performed by: INTERNAL MEDICINE

## 2020-09-15 PROCEDURE — G8420 CALC BMI NORM PARAMETERS: HCPCS | Performed by: INTERNAL MEDICINE

## 2020-09-15 PROCEDURE — G8399 PT W/DXA RESULTS DOCUMENT: HCPCS | Performed by: INTERNAL MEDICINE

## 2020-09-15 PROCEDURE — 3023F SPIROM DOC REV: CPT | Performed by: INTERNAL MEDICINE

## 2020-09-15 PROCEDURE — G8427 DOCREV CUR MEDS BY ELIG CLIN: HCPCS | Performed by: INTERNAL MEDICINE

## 2020-09-15 PROCEDURE — 99214 OFFICE O/P EST MOD 30 MIN: CPT | Performed by: INTERNAL MEDICINE

## 2020-09-15 PROCEDURE — 1123F ACP DISCUSS/DSCN MKR DOCD: CPT | Performed by: INTERNAL MEDICINE

## 2020-09-15 ASSESSMENT — ENCOUNTER SYMPTOMS
GASTROINTESTINAL NEGATIVE: 1
CHEST TIGHTNESS: 0
COUGH: 1
SHORTNESS OF BREATH: 1
WHEEZING: 0

## 2020-09-15 NOTE — PROGRESS NOTES
Vaccine Information Sheet, \"Influenza - Inactivated\"  given to Memo Sanabria, or parent/legal guardian of  Memo Sanabria and verbalized understanding. Patient responses:    Have you ever had a reaction to a flu vaccine? No  Do you have any current illness? No  Have you ever had Guillian Swatara Syndrome? No  Do you have a serious allergy to any of the follow: Neomycin, Polymyxin, Thimerosal, eggs or egg products? No    Flu vaccine given per order. Please see immunization tab. Risks and benefits explained. Current VIS given.       Immunizations Administered     Name Date Dose Route    Influenza, Quadv, adjuvanted, 65 yrs +, IM, PF (Fluad) 9/15/2020 0.5 mL Intramuscular    Site: Deltoid- Right    Lot: [id number]    NDC: 46113-906-94

## 2020-09-15 NOTE — PATIENT INSTRUCTIONS
getting oxygen 5 liters at rest and 9 liters at home with activity  Continue current medications  Start on diclofenac and tale 2 times daily with food and if not getting better,see Ortho MD for evaluation -DR. Nina Goddard  See in  3 months

## 2020-09-15 NOTE — PROGRESS NOTES
Subjective:      Patient ID: Memo Sanabria is a 79 y.o. female. HPI  Ankles swell up at end of day and none in am   Has no sob at rest and when she sleeps  Has exertional dyspnea even with slight activity  Has no cough if at all little and small ones and ha s no wheezing or wheezing  Has no other cp gi or gu symptoms but for chronic constipation. Has occasional heart burns and has no dysphagia  Pain left shoulder x 2 weeks and increases with movement  Review of Systems   Constitutional: Negative for activity change, appetite change and unexpected weight change. Respiratory: Positive for cough and shortness of breath. Negative for chest tightness and wheezing. Cardiovascular: Negative for chest pain, palpitations and leg swelling. Gastrointestinal: Negative. Genitourinary: Negative. Neurological: Negative for dizziness, light-headedness and headaches. Objective:   Physical Exam  Vitals signs and nursing note reviewed. Constitutional:       General: She is not in acute distress. Eyes:      General: No scleral icterus. Extraocular Movements: Extraocular movements intact. Conjunctiva/sclera: Conjunctivae normal.      Pupils: Pupils are equal, round, and reactive to light. Neck:      Thyroid: No thyromegaly. Vascular: No carotid bruit or JVD. Cardiovascular:      Rate and Rhythm: Regular rhythm. Tachycardia present. Pulses: Normal pulses. Heart sounds: Normal heart sounds. No murmur. No gallop. Pulmonary:      Effort: No respiratory distress. Breath sounds: No wheezing or rhonchi. Rales: feew at baes and has decreased bs both lungs. Musculoskeletal:      Right lower leg: No edema. Left lower leg: No edema. Lymphadenopathy:      Cervical: No cervical adenopathy. Neurological:      Mental Status: She is alert and oriented to person, place, and time.      has decreased rom left shoulder with flexion and abduction actively but passively has full rom

## 2020-09-21 ENCOUNTER — OFFICE VISIT (OUTPATIENT)
Dept: PULMONOLOGY | Age: 80
End: 2020-09-21
Payer: MEDICARE

## 2020-09-21 VITALS
SYSTOLIC BLOOD PRESSURE: 126 MMHG | RESPIRATION RATE: 16 BRPM | WEIGHT: 139.8 LBS | OXYGEN SATURATION: 95 % | HEART RATE: 105 BPM | HEIGHT: 63 IN | DIASTOLIC BLOOD PRESSURE: 60 MMHG | BODY MASS INDEX: 24.77 KG/M2 | TEMPERATURE: 98.1 F

## 2020-09-21 DIAGNOSIS — Z79.899 HIGH RISK MEDICATION USE: ICD-10-CM

## 2020-09-21 LAB — THEOPHYLLINE LEVEL: 4.1 UG/ML (ref 8–20)

## 2020-09-21 PROCEDURE — 4040F PNEUMOC VAC/ADMIN/RCVD: CPT | Performed by: INTERNAL MEDICINE

## 2020-09-21 PROCEDURE — 99214 OFFICE O/P EST MOD 30 MIN: CPT | Performed by: INTERNAL MEDICINE

## 2020-09-21 PROCEDURE — G8420 CALC BMI NORM PARAMETERS: HCPCS | Performed by: INTERNAL MEDICINE

## 2020-09-21 PROCEDURE — G8926 SPIRO NO PERF OR DOC: HCPCS | Performed by: INTERNAL MEDICINE

## 2020-09-21 PROCEDURE — 3023F SPIROM DOC REV: CPT | Performed by: INTERNAL MEDICINE

## 2020-09-21 PROCEDURE — G8399 PT W/DXA RESULTS DOCUMENT: HCPCS | Performed by: INTERNAL MEDICINE

## 2020-09-21 PROCEDURE — G8427 DOCREV CUR MEDS BY ELIG CLIN: HCPCS | Performed by: INTERNAL MEDICINE

## 2020-09-21 PROCEDURE — 1123F ACP DISCUSS/DSCN MKR DOCD: CPT | Performed by: INTERNAL MEDICINE

## 2020-09-21 PROCEDURE — 1036F TOBACCO NON-USER: CPT | Performed by: INTERNAL MEDICINE

## 2020-09-21 PROCEDURE — 1090F PRES/ABSN URINE INCON ASSESS: CPT | Performed by: INTERNAL MEDICINE

## 2020-09-21 NOTE — PROGRESS NOTES
Chief complaint  This is a 79y.o. year old female  who presents with a chief complaint of   Chief Complaint   Patient presents with    COPD     Emphysema       HPI  50-year-old woman with moderate COPD (FEV1 1.36 L, 61% predicted), high risk medication use, multifocal pneumonia, chronic hypoxic respiratory failure and Mycobacterium avium complex infection (completed 18 months of therapy) presents for follow-up. She continues to have limiting shortness of breath. She is short of breath walking from one room to the next in her home. She has to use her albuterol inhaler before she can walk to the door to let the dog out. She is able to do some chores at home, but it takes her a long time to do them. She has family and friends that help her. She feels that she has enough help at home and does not need other assistance. She denies a chronic cough. She is compliant with Wixela and Spiriva. She uses DuoNebs about 3 times a day and 3% saline nebulizers as needed. She is on theophylline 200 mg daily and prednisone 10 mg daily. She uses 5 L of oxygen with her portable oxygen concentrator and 9 L of oxygen at home. She completed pulmonary rehabilitation. Past Medical History:   Diagnosis Date    COPD (chronic obstructive pulmonary disease) ([address]     GERD (gastroesophageal reflux disease)     High cholesterol     Osteoporosis        Past Surgical History:   Procedure Laterality Date     SECTION      4 times    COLONOSCOPY  .     redundant colon       Current Outpatient Medications   Medication Sig Dispense Refill    diclofenac (VOLTAREN) 50 MG EC tablet Take 1 tablet by mouth 2 times daily 30 tablet 0    montelukast (SINGULAIR) 10 MG tablet TAKE 1 TABLET NIGHTLY 90 tablet 3    theophylline (RAFA-24) 200 MG extended release capsule Take 1 capsule by mouth daily 90 capsule 2    SPIRIVA HANDIHALER 18 MCG inhalation capsule INHALE THE CONTENTS OF 1 CAPSULE DAILY 90 capsule 1    levothyroxine (SYNTHROID) 100 MCG tablet TAKE 1 TABLET EVERY DAY 90 tablet 1    metFORMIN (GLUCOPHAGE-XR) 500 MG extended release tablet TAKE 1 TABLET EVERY DAY WITH BREAKFAST 90 tablet 1    ipratropium-albuterol (DUONEB) 0.5-2.5 (3) MG/3ML SOLN nebulizer solution INHALE THREE MILLILITERS VIA NEBULIZATION BY MOUTH EVERY 4 HOURS 540 mL 3    fluticasone-salmeterol (WIXELA INHUB) 500-50 MCG/DOSE diskus inhaler INHALE 1 PUFF TWICE DAILY 1 Inhaler 5    predniSONE (DELTASONE) 10 MG tablet TAKE 1 TABLET EVERY DAY 90 tablet 3    omeprazole (PRILOSEC) 40 MG delayed release capsule TAKE 1 CAPSULE EVERY DAY 90 capsule 1    atorvastatin (LIPITOR) 20 MG tablet TAKE 1 TABLET EVERY DAY 90 tablet 1    albuterol sulfate  (90 Base) MCG/ACT inhaler Inhale 2 puffs into the lungs every 4 hours as needed for Wheezing or Shortness of Breath 1 Inhaler 3    Cyanocobalamin (VITAMIN B 12 PO) Take 1 tablet by mouth daily      Glucos-MSM-C-Qn-Upgvpa-Mydxfp (GLUCOSAMINE MSM COMPLEX PO) Take 1 tablet by mouth 2 times daily      Lancets MISC 1 each by Does not apply route daily 100 each 3    blood glucose monitor strips Test 1  time a day & as needed for symptoms of irregular blood glucose. 100 strip 3    aspirin 81 MG tablet Take 81 mg by mouth 3 times a week      Calcium Carb-Cholecalciferol 600-500 MG-UNIT CAPS Take 1 tablet by mouth 2 times daily 1 capsule 0    albuterol (PROVENTIL) (2.5 MG/3ML) 0.083% nebulizer solution Take 3 mLs by nebulization 2 times daily 180 mL 11    saline nasal gel (AYR) GEL APPLY INSIDE BOTH NOSTRILS TWO TIMES A DAY 1 Tube 2    sodium chloride 3 % nebulizer solution Take 15 mLs by nebulization as needed for Other or Cough 300 mL 1    Misc. Devices (ACAPELLA) MISC 1 Device by Does not apply route daily (Patient not taking: Reported on 9/21/2020) 1 each 0     No current facility-administered medications for this visit.         Allergies   Allergen Reactions    Sulfa Antibiotics        Family History   Problem Relation Age of Onset    Diabetes Sister         x's 3    Other Sister         cad    High Blood Pressure Mother     Diabetes Mother     Heart Attack Mother     Other Mother         hypothyroidism    High Blood Pressure Father     Heart Attack Father     Heart Attack Maternal Aunt        Social History     Socioeconomic History    Marital status:       Spouse name: Not on file    Number of children: Not on file    Years of education: Not on file    Highest education level: Not on file   Occupational History    Not on file   Social Needs    Financial resource strain: Not hard at all    Food insecurity     Worry: Never true     Inability: Never true   [employer] needs     Medical: No     Non-medical: No   Tobacco Use    Smoking status: Former Smoker     Packs/day: 1.50     Years: 50.00     Pack years: 75.00     Types: Cigarettes     Start date: 1951     Last attempt to quit: 1999     Years since quittin.0    Smokeless tobacco: Never Used    Tobacco comment: quit 10 years ago   Substance and Sexual Activity    Alcohol use: No     Alcohol/week: 0.0 standard drinks    Drug use: No    Sexual activity: Not on file   Lifestyle    Physical activity     Days per week: Not on file     Minutes per session: Not on file    Stress: Not on file   Relationships    Social connections     Talks on phone: Not on file     Gets together: Not on file     Attends [employer] service: Not on file     Active member of club or organization: Not on file     Attends meetings of clubs or organizations: Not on file     Relationship status: Not on file    Intimate partner violence     Fear of current or ex partner: Not on file     Emotionally abused: Not on file     Physically abused: Not on file     Forced sexual activity: Not on file   Other Topics Concern    Not on file   Social History Narrative    Not on file       Immunization History   Administered Date(s) Administered    Influenza Nasal 11/01/2014    Influenza Vaccine, unspecified formulation 10/29/2014, 10/12/2015, 11/15/2016    Influenza Virus Vaccine 10/01/2008, 11/01/2014    Influenza Whole 10/01/2008    Influenza, High Dose (Fluzone 65 yrs and older) 10/12/2015, 11/15/2016, 10/19/2018, 10/28/2019    Influenza, [place], Recombinant, IM PF (Flublok 18 yrs and older) 10/28/2019    Influenza, Quadv, adjuvanted, 65 yrs +, IM, PF (Fluad) 09/15/2020    Pneumococcal Conjugate 13-valent ([id number]) 04/15/2016    Pneumococcal Conjugate 7-valent (Prevnar7) 11/01/2005, 10/11/2010    Pneumococcal Polysaccharide ([id number]) 11/01/2005, 03/10/2017    Td (Adult), 2 Lf Tetanus Toxoid, Pf (Td, Absorbed) 08/05/2020       ROS:  GENERAL:  No fevers, no chills, no weight loss or gain  RESPIRATORY:  +exertional shortness of breath, no cough      PHYSICAL EXAM:  Vitals:    09/21/20 1333   BP: 126/60   Site: Right Upper Arm   Position: Sitting   Cuff Size: Medium Adult   Pulse: 105   Resp: 16   Temp: 98.1 °F (36.7 °C)   TempSrc: Oral   SpO2: 95%   Weight: 139 lb 12.8 oz (63.4 kg)   Height: 5' 3\" (1.6 m)   on 4L continuous NC    Gen: Well developed; well nourished  Eyes: No scleral icterus. No conjunctival injection. ENT:  Oropharynx clear. External appearance of ears and nose normal.  Neck: Trachea midline. No obvious mass. No visible thyroid enlargement    Respiratory: Clear to auscultation bilaterally, no accessory muscle use  Cardiovascular: Regular rate and rhythm, no appreciable murmurs. No edema. Gastrointestinal: Soft, non-tender. No hernia  Skin: Warm and dry. No rashes or ulcers on visible areas. Normal texture and turgor  Lymphatic: No cervical LAD. No supraclavicular LAD. Musculoskeletal: No cyanosis, clubbing or joint deformity.     Psychiatric: Normal mood and affect; exhibits normal insight and judgement       Data reviewed:  Pulmonary Function Testing (2/8/19)  FVC 2.26 L at 93% predicted ---> 2.28L at 94% predicted  FEV1 1.05 L at 56% predicted -----> 1.14L at 61% predicted  FEV1/FVC ratio at 46%----> 50%  TLC 4.64 L at 95% predicted  VC 2.41L at 99% predicted  ERV 0.86L at 106% predicted  RV/TLC at 48% predicted  DLCO 4.37 at 19% predicted  DLCO/VA 1.19L at 25 % predicted    Overall: Moderate lower airflow obstruction without significant reversal; lung volumes are normal; diffusing capacity is severely reduced and does not normalize when averaged over lung volumes (compared to the study in June 2016 flow measurements and vital capacity have significantly declined, other findings have not significantly changed)          Six minute walk test:  10/23/18- Walked 244m, 61% predicted (reduced); iwona saturation 86% on RA---> 90% on 8L NC  2/19/19- Walked 244m, 62% predicted (reduced); 90% on 8L NC      Images and reports of chest imaging were reviewed by me. My interpretation is:  CXR (2/27/20): Infiltrate in the right lower lobe; hyperinflated chest  CXR (7/20/20): Hyperinflated chest.  Compared to the study in February 2020 the right lower lobe infiltrate has resolved  Chest CT (4/26/18): Prominent mediastinal nodes; centrilobular emphysema; nodular density in the minor fissure (unchanged compared to 4/2017); amorphous opacity in the left upper lobe (increased compared to 4/2107); RLL nodule (unchanged compared to 4/2017); nodular density in the LLL (stable compared to 4/2017)  Chest CT (7/25/18): No LAD; centrilobular emphysema; nodular density in the right minor fissure and right lower lobe (unchanged compared to April 2017); left lower lobe nodule (unchanged compared to April 2017); left apical nodular density has resolved compared to April 2018  Chest CT (2/27/20):  No LAD; centrilobular emphysema; consolidation in the right middle lobe, right lower lobe and lingula; infiltrate in the left lower lobe and left upper lobe      ECHO (9/2/20)  Summary   Left ventricular cavity size is normal.   There is moderate concentric left ventricular hypertrophy. Ejection fraction is visually estimated to be 55-60%. No regional wall motion abnormalities are noted. Mitral valve leaflets appear mildly thickened. Mitral annular calcification is present. Trivial mitral regurgitation is present. Aortic valve leaflets appear thickened. Trivial aortic regurgitation is present. No evidence of aortic valve stenosis. Lab Results   Component Value Date    WBC 11.0 07/20/2020    HGB 11.1 (L) 07/20/2020    HCT 35.4 (L) 07/20/2020    MCV 84.3 07/20/2020     07/20/2020       No results found for: BNP    Lab Results   Component Value Date    CREATININE 0.5 (L) 02/27/2020    BUN 17 02/27/2020     02/27/2020    K 4.3 02/27/2020     02/27/2020    CO2 24 02/27/2020   No allergies  IgE- 6  Alpha 1 antitrypsin (3/4/20): M1M2- 171 (normal)      Assessment/Plan:79y.o. year old female presents for follow up. COPD- We discussed that her breathing has not improved despite maximal medical therapy and that the goal at this point will be keeping her lungs in a steady state for as long as possible. We discussed her maintaining her independence. She feels she has enough help at home from friends and family. She will continue Wixela and Spiriva. Continue DuoNebs and 3% saline nebulizers as needed. She will continue theophylline 200 mg daily and prednisone 10 mg daily along with omeprazole, calcium and vitamin D. She has completed pulmonary rehabilitation. High risk medication use- Will check theophylline level. Multifocal pneumonia- Resolved on current chest x-ray. Chronic hypoxic respiratory failure- She will continue 5 to 9 L of oxygen continuously to maintain an oxygen saturation greater than 90%. She is to return for follow-up in 3 months.       Cathie Sequeira MD  [facility] Pulmonology, Critical Care and Sleep

## 2020-09-23 ENCOUNTER — TELEPHONE (OUTPATIENT)
Dept: SLEEP MEDICINE | Age: 80
End: 2020-09-23

## 2020-09-23 NOTE — TELEPHONE ENCOUNTER
Patient called requesting for her medication for Hernan to be for 200 and not 400 mg, patient request and sent to her [facility] mail order. Pt also requested to only call her home phone number and not cell.     Please adsive

## 2020-09-24 ENCOUNTER — TELEPHONE (OUTPATIENT)
Dept: PULMONOLOGY | Age: 80
End: 2020-09-24

## 2020-09-29 ENCOUNTER — TELEPHONE (OUTPATIENT)
Dept: PULMONOLOGY | Age: 80
End: 2020-09-29

## 2020-09-29 NOTE — TELEPHONE ENCOUNTER
[address] [phone] called and requested clarification for patient's Hernan-24 RX. I confirmed patient is to have Hernan-24 300 mg prescribed per Dr. Win Saez. Alyssa verbalized understanding. I spoke with Carmelita Gautam, the pharmacist, and he confirmed the medication was sent out.

## 2020-10-13 DIAGNOSIS — Z79.899 HIGH RISK MEDICATION USE: ICD-10-CM

## 2020-10-13 LAB — THEOPHYLLINE LEVEL: 4.7 UG/ML (ref 8–20)

## 2020-10-21 RX ORDER — ATORVASTATIN CALCIUM 20 MG/1
TABLET, FILM COATED ORAL
Qty: 90 TABLET | Refills: 1 | Status: SHIPPED | OUTPATIENT
Start: 2020-10-21 | End: 2021-04-07

## 2020-10-21 NOTE — TELEPHONE ENCOUNTER
LAST OFFICE VISIT:09/15/2020    Future Appointments   Date Time Provider Omar Barros   12/15/2020 12:15 PM Eva Beckett MD [address]   1/18/2021 11:20 AM MD S[facility]

## 2020-10-27 ENCOUNTER — TELEPHONE (OUTPATIENT)
Dept: PULMONOLOGY | Age: 80
End: 2020-10-27

## 2020-10-27 NOTE — TELEPHONE ENCOUNTER
Kathy calls in to inform us she is going back to Hernan 200mg one daily. Still experiences racing heart rate with 300 tablets.

## 2020-11-24 ENCOUNTER — TELEPHONE (OUTPATIENT)
Dept: PULMONOLOGY | Age: 80
End: 2020-11-24

## 2020-11-24 ENCOUNTER — OFFICE VISIT (OUTPATIENT)
Dept: INTERNAL MEDICINE CLINIC | Age: 80
End: 2020-11-24
Payer: MEDICARE

## 2020-11-24 VITALS
HEART RATE: 110 BPM | DIASTOLIC BLOOD PRESSURE: 68 MMHG | TEMPERATURE: 98.1 F | RESPIRATION RATE: 22 BRPM | BODY MASS INDEX: 24.62 KG/M2 | SYSTOLIC BLOOD PRESSURE: 132 MMHG | OXYGEN SATURATION: 98 % | WEIGHT: 139 LBS

## 2020-11-24 PROCEDURE — 1036F TOBACCO NON-USER: CPT | Performed by: INTERNAL MEDICINE

## 2020-11-24 PROCEDURE — 1090F PRES/ABSN URINE INCON ASSESS: CPT | Performed by: INTERNAL MEDICINE

## 2020-11-24 PROCEDURE — 3023F SPIROM DOC REV: CPT | Performed by: INTERNAL MEDICINE

## 2020-11-24 PROCEDURE — G8427 DOCREV CUR MEDS BY ELIG CLIN: HCPCS | Performed by: INTERNAL MEDICINE

## 2020-11-24 PROCEDURE — 99213 OFFICE O/P EST LOW 20 MIN: CPT | Performed by: INTERNAL MEDICINE

## 2020-11-24 PROCEDURE — G8926 SPIRO NO PERF OR DOC: HCPCS | Performed by: INTERNAL MEDICINE

## 2020-11-24 PROCEDURE — 1123F ACP DISCUSS/DSCN MKR DOCD: CPT | Performed by: INTERNAL MEDICINE

## 2020-11-24 PROCEDURE — 4040F PNEUMOC VAC/ADMIN/RCVD: CPT | Performed by: INTERNAL MEDICINE

## 2020-11-24 PROCEDURE — G8484 FLU IMMUNIZE NO ADMIN: HCPCS | Performed by: INTERNAL MEDICINE

## 2020-11-24 PROCEDURE — G8420 CALC BMI NORM PARAMETERS: HCPCS | Performed by: INTERNAL MEDICINE

## 2020-11-24 PROCEDURE — G8399 PT W/DXA RESULTS DOCUMENT: HCPCS | Performed by: INTERNAL MEDICINE

## 2020-11-24 RX ORDER — PREDNISONE 20 MG/1
TABLET ORAL
Qty: 18 TABLET | Refills: 0 | Status: SHIPPED | OUTPATIENT
Start: 2020-11-24 | End: 2021-01-18 | Stop reason: ALTCHOICE

## 2020-11-24 RX ORDER — DOXYCYCLINE HYCLATE 100 MG
100 TABLET ORAL 2 TIMES DAILY
Qty: 14 TABLET | Refills: 0 | Status: SHIPPED | OUTPATIENT
Start: 2020-11-24 | End: 2020-12-01

## 2020-11-24 ASSESSMENT — ENCOUNTER SYMPTOMS
CONSTIPATION: 0
SHORTNESS OF BREATH: 1
DIARRHEA: 0
ABDOMINAL PAIN: 0
SORE THROAT: 0
CHEST TIGHTNESS: 0
COUGH: 1
NAUSEA: 0
WHEEZING: 0

## 2020-11-24 NOTE — PROGRESS NOTES
Subjective:      Patient ID: Juancho Boothe is a 79 y.o. female. HPI  She is doing ok   Today she was started on prednisone and doxy for increase in dyspnea cough and sputum  Has no problems braething at rest and sleeping but hardly can take few steps without getting dyspnea  Uses 9+ liters of oxygen   She has chronic constipation and 1-2 loose bm on and off and take s laxatives and stool softeners  Ha sno heart burns or abdominal pain   Review of Systems   Constitutional: Negative for chills and fever. HENT: Negative for sore throat. Respiratory: Positive for cough and shortness of breath. Negative for chest tightness and wheezing. Cardiovascular: Positive for leg swelling. Negative for chest pain and palpitations. Gastrointestinal: Negative for abdominal pain, constipation, diarrhea and nausea. Genitourinary: Negative. Neurological: Negative for dizziness, light-headedness and headaches. Objective:   Physical Exam  Vitals signs reviewed. Constitutional:       General: She is not in acute distress. Eyes:      General: No scleral icterus. Extraocular Movements: Extraocular movements intact. Pupils: Pupils are equal, round, and reactive to light. Neck:      Thyroid: No thyromegaly. Vascular: No carotid bruit or JVD. Cardiovascular:      Rate and Rhythm: Regular rhythm. Tachycardia present. Pulses: Normal pulses. Heart sounds: Normal heart sounds. No murmur. No gallop. Pulmonary:      Effort: No respiratory distress. Breath sounds: Stridor: has decreased bs both lungs. No wheezing, rhonchi or rales. Musculoskeletal:      Right lower leg: Right lower leg edema: has sligth ankel edema both sides. Lymphadenopathy:      Cervical: No cervical adenopathy. Neurological:      Mental Status: She is alert and oriented to person, place, and time.          Assessment:      Sever copd with chronic resp failure      Plan:      Continue oxygen   Start miralax 1

## 2020-11-24 NOTE — TELEPHONE ENCOUNTER
Kathy called wanting to know if Dr Lynette Galvan can call something in to the [facility] in [place] for her. She said she feels her SOB has increased over the past 2 days. She is afebrile and coughing up yellowish sputum.   Her O2 is on 9L and her O2 sats are 98%  She also wanted Dr Lynette Galvan to be aware she is back on the Hernan 24 200mg now for about a week

## 2020-11-24 NOTE — TELEPHONE ENCOUNTER
I have sent prescriptions for a 9-day taper of prednisone and 7 days of doxycycline. She should let me know if she does not feel better.

## 2020-12-03 ENCOUNTER — TELEPHONE (OUTPATIENT)
Dept: PULMONOLOGY | Age: 80
End: 2020-12-03

## 2020-12-03 NOTE — TELEPHONE ENCOUNTER
Kathy called with an FYI for Dr Mirna Valenzuela.   She wanted to let Dr Mirna Valenzuela know the steroid and the anitbiotic she prescribed a week ago has improved her symptoms

## 2020-12-16 NOTE — TELEPHONE ENCOUNTER
Last office visit : 11/24/2020    Future Appointments   Date Time Provider Omar Garciai   1/18/2021 11:20 AM [facility]   2/24/2021  3:30 PM Nusrat Brooks MD [facility]

## 2021-01-05 ENCOUNTER — TELEPHONE (OUTPATIENT)
Dept: PULMONOLOGY | Age: 81
End: 2021-01-05

## 2021-01-05 RX ORDER — AMOXICILLIN AND CLAVULANATE POTASSIUM 875; 125 MG/1; MG/1
1 TABLET, FILM COATED ORAL 2 TIMES DAILY
Qty: 20 TABLET | Refills: 0 | Status: SHIPPED | OUTPATIENT
Start: 2021-01-05 | End: 2021-01-15

## 2021-01-05 RX ORDER — PREDNISONE 20 MG/1
TABLET ORAL
Qty: 18 TABLET | Refills: 0 | Status: SHIPPED | OUTPATIENT
Start: 2021-01-05 | End: 2021-01-18 | Stop reason: ALTCHOICE

## 2021-01-05 NOTE — TELEPHONE ENCOUNTER
Complains of cough mainly and SOB  Duration: 1/4/21 am  Cough with sputum production: Yes, mostly at night  Color: Yellow  Fever: No   Any other Symptoms: Trouble getting a deep breath, O2 is at 95 9L continuous  Any current treatment tried: Coricidin   Using inhalers: Yes do they help:   Pharmacy: [facility] in [place]    Patient would like something called in.

## 2021-01-18 ENCOUNTER — OFFICE VISIT (OUTPATIENT)
Dept: PULMONOLOGY | Age: 81
End: 2021-01-18
Payer: MEDICARE

## 2021-01-18 VITALS
WEIGHT: 137.2 LBS | DIASTOLIC BLOOD PRESSURE: 64 MMHG | SYSTOLIC BLOOD PRESSURE: 118 MMHG | OXYGEN SATURATION: 91 % | RESPIRATION RATE: 12 BRPM | TEMPERATURE: 97.7 F | HEIGHT: 63 IN | HEART RATE: 101 BPM | BODY MASS INDEX: 24.31 KG/M2

## 2021-01-18 DIAGNOSIS — J96.11 CHRONIC RESPIRATORY FAILURE WITH HYPOXIA (HCC): ICD-10-CM

## 2021-01-18 DIAGNOSIS — J43.2 CENTRILOBULAR EMPHYSEMA (HCC): Primary | ICD-10-CM

## 2021-01-18 DIAGNOSIS — J20.9 ACUTE BRONCHITIS, UNSPECIFIED ORGANISM: ICD-10-CM

## 2021-01-18 PROCEDURE — 4040F PNEUMOC VAC/ADMIN/RCVD: CPT | Performed by: INTERNAL MEDICINE

## 2021-01-18 PROCEDURE — 1123F ACP DISCUSS/DSCN MKR DOCD: CPT | Performed by: INTERNAL MEDICINE

## 2021-01-18 PROCEDURE — 1090F PRES/ABSN URINE INCON ASSESS: CPT | Performed by: INTERNAL MEDICINE

## 2021-01-18 PROCEDURE — G8399 PT W/DXA RESULTS DOCUMENT: HCPCS | Performed by: INTERNAL MEDICINE

## 2021-01-18 PROCEDURE — 1036F TOBACCO NON-USER: CPT | Performed by: INTERNAL MEDICINE

## 2021-01-18 PROCEDURE — 3023F SPIROM DOC REV: CPT | Performed by: INTERNAL MEDICINE

## 2021-01-18 PROCEDURE — 99214 OFFICE O/P EST MOD 30 MIN: CPT | Performed by: INTERNAL MEDICINE

## 2021-01-18 PROCEDURE — G8484 FLU IMMUNIZE NO ADMIN: HCPCS | Performed by: INTERNAL MEDICINE

## 2021-01-18 PROCEDURE — G8420 CALC BMI NORM PARAMETERS: HCPCS | Performed by: INTERNAL MEDICINE

## 2021-01-18 PROCEDURE — G8427 DOCREV CUR MEDS BY ELIG CLIN: HCPCS | Performed by: INTERNAL MEDICINE

## 2021-01-18 PROCEDURE — G8926 SPIRO NO PERF OR DOC: HCPCS | Performed by: INTERNAL MEDICINE

## 2021-01-18 RX ORDER — AZITHROMYCIN 250 MG/1
250 TABLET, FILM COATED ORAL SEE ADMIN INSTRUCTIONS
Qty: 6 TABLET | Refills: 1 | Status: SHIPPED | OUTPATIENT
Start: 2021-01-18 | End: 2021-01-23

## 2021-01-18 RX ORDER — PREDNISONE 20 MG/1
TABLET ORAL
Qty: 18 TABLET | Refills: 1 | Status: SHIPPED | OUTPATIENT
Start: 2021-01-18 | End: 2021-08-26 | Stop reason: ALTCHOICE

## 2021-01-18 NOTE — PROGRESS NOTES
Chief complaint  This is a 79y.o. year old female  who presents with a chief complaint of   Chief Complaint   Patient presents with    COPD       HPI  58-year-old woman with moderate COPD (FEV1 1.36 L, 61% predicted), chronic hypoxic respiratory failure and Mycobacterium avium complex infection (completed 18 months of therapy) presents for follow-up. She continues to have significant shortness of breath. She says yesterday was particularly bad such that raising her arms made her short of breath. She says today her breathing is a little better. She reports cough with yellow mucus. She is compliant with Wixela and Spiriva. She uses DuoNebs 3 times a day and 3% saline nebulizers as needed. She is on theophylline 300 mg a day and prednisone 10 mg a day. She uses 5 L of oxygen with her portable concentrator and 9 L when she is at home. She completed pulmonary rehabilitation. Past Medical History:   Diagnosis Date    COPD (chronic obstructive pulmonary disease) ([address]     GERD (gastroesophageal reflux disease)     High cholesterol     Osteoporosis        Past Surgical History:   Procedure Laterality Date     SECTION      4 times    COLONOSCOPY  .     redundant colon       Current Outpatient Medications   Medication Sig Dispense Refill    predniSONE (DELTASONE) 20 MG tablet Take 60 mg for 3 days, then 40 mg for 3 days, then 20 mg for 3 days, 18 tablet 1    azithromycin (ZITHROMAX) 250 MG tablet Take 1 tablet by mouth See Admin Instructions for 5 days 500mg on day 1 followed by 250mg on days 2 - 5 6 tablet 1    fluticasone-salmeterol (WIXELA INHUB) 500-50 MCG/DOSE diskus inhaler INHALE 1 PUFF TWICE DAILY 3 Inhaler 1    albuterol sulfate  (90 Base) MCG/ACT inhaler Inhale 2 puffs into the lungs every 4 hours as needed for Wheezing or Shortness of Breath 1 Inhaler 5    atorvastatin (LIPITOR) 20 MG tablet TAKE 1 TABLET EVERY DAY 90 tablet 1    theophylline (RAFA-24) 300 MG extended release capsule Take 1 capsule by mouth daily 90 capsule 2    montelukast (SINGULAIR) 10 MG tablet TAKE 1 TABLET NIGHTLY 90 tablet 3    SPIRIVA HANDIHALER 18 MCG inhalation capsule INHALE THE CONTENTS OF 1 CAPSULE DAILY 90 capsule 1    levothyroxine (SYNTHROID) 100 MCG tablet TAKE 1 TABLET EVERY DAY 90 tablet 1    metFORMIN (GLUCOPHAGE-XR) 500 MG extended release tablet TAKE 1 TABLET EVERY DAY WITH BREAKFAST 90 tablet 1    ipratropium-albuterol (DUONEB) 0.5-2.5 (3) MG/3ML SOLN nebulizer solution INHALE THREE MILLILITERS VIA NEBULIZATION BY MOUTH EVERY 4 HOURS 540 mL 3    predniSONE (DELTASONE) 10 MG tablet TAKE 1 TABLET EVERY DAY 90 tablet 3    omeprazole (PRILOSEC) 40 MG delayed release capsule TAKE 1 CAPSULE EVERY DAY 90 capsule 1    Cyanocobalamin (VITAMIN B 12 PO) Take 1 tablet by mouth daily      Glucos-MSM-C-Az-Nxzuml-Rftlbu (GLUCOSAMINE MSM COMPLEX PO) Take 1 tablet by mouth 2 times daily      Lancets MISC 1 each by Does not apply route daily 100 each 3    blood glucose monitor strips Test 1  time a day & as needed for symptoms of irregular blood glucose. 100 strip 3    aspirin 81 MG tablet Take 81 mg by mouth 3 times a week      Calcium Carb-Cholecalciferol 600-500 MG-UNIT CAPS Take 1 tablet by mouth 2 times daily 1 capsule 0    albuterol (PROVENTIL) (2.5 MG/3ML) 0.083% nebulizer solution Take 3 mLs by nebulization 2 times daily 180 mL 11    saline nasal gel (AYR) GEL APPLY INSIDE BOTH NOSTRILS TWO TIMES A DAY 1 Tube 2    Misc. Devices (ACAPELLA) MISC 1 Device by Does not apply route daily 1 each 0    sodium chloride 3 % nebulizer solution Take 15 mLs by nebulization as needed for Other or Cough 300 mL 1     No current facility-administered medications for this visit.         Allergies   Allergen Reactions    Sulfa Antibiotics        Family History   Problem Relation Age of Onset    Diabetes Sister         x's 2    Other Sister         елена Munroe High Blood Pressure Mother     Diabetes Mother     Heart Attack Mother     Other Mother         hypothyroidism    High Blood Pressure Father     Heart Attack Father     Heart Attack Maternal Aunt        Social History     Socioeconomic History    Marital status:       Spouse name: Not on file    Number of children: Not on file    Years of education: Not on file    Highest education level: Not on file   Occupational History    Not on file   Social Needs    Financial resource strain: Not hard at all    Food insecurity     Worry: Never true     Inability: Never true   [employer] needs     Medical: No     Non-medical: No   Tobacco Use    Smoking status: Former Smoker     Packs/day: 1.50     Years: 50.00     Pack years: 75.00     Types: Cigarettes     Start date: 1951     Quit date: 1999     Years since quittin.3    Smokeless tobacco: Never Used    Tobacco comment: quit 10 years ago   Substance and Sexual Activity    Alcohol use: No     Alcohol/week: 0.0 standard drinks    Drug use: No    Sexual activity: Not on file   Lifestyle    Physical activity     Days per week: Not on file     Minutes per session: Not on file    Stress: Not on file   Relationships    Social connections     Talks on phone: Not on file     Gets together: Not on file     Attends Bahai service: Not on file     Active member of club or organization: Not on file     Attends meetings of clubs or organizations: Not on file     Relationship status: Not on file    Intimate partner violence     Fear of current or ex partner: Not on file     Emotionally abused: Not on file     Physically abused: Not on file     Forced sexual activity: Not on file   Other Topics Concern    Not on file   Social History Narrative    Not on file       Immunization History   Administered Date(s) Administered    Influenza Nasal 2014    Influenza Vaccine, unspecified formulation 10/29/2014, 10/12/2015, 11/15/2016    95% predicted  VC 2.41L at 99% predicted  ERV 0.86L at 106% predicted  RV/TLC at 48% predicted  DLCO 4.37 at 19% predicted  DLCO/VA 1.19L at 25 % predicted    Overall: Moderate lower airflow obstruction without significant reversal; lung volumes are normal; diffusing capacity is severely reduced and does not normalize when averaged over lung volumes (compared to the study in June 2016 flow measurements and vital capacity have significantly declined, other findings have not significantly changed)          Six minute walk test:  10/23/18- Walked 244m, 61% predicted (reduced); iwona saturation 86% on RA---> 90% on 8L NC  2/19/19- Walked 244m, 62% predicted (reduced); 90% on 8L NC      Images and reports of chest imaging were reviewed by me. My interpretation is:  CXR (2/27/20): Infiltrate in the right lower lobe; hyperinflated chest  CXR (7/20/20): Hyperinflated chest.  Compared to the study in February 2020 the right lower lobe infiltrate has resolved  Chest CT (4/26/18): Prominent mediastinal nodes; centrilobular emphysema; nodular density in the minor fissure (unchanged compared to 4/2017); amorphous opacity in the left upper lobe (increased compared to 4/2107); RLL nodule (unchanged compared to 4/2017); nodular density in the LLL (stable compared to 4/2017)  Chest CT (7/25/18): No LAD; centrilobular emphysema; nodular density in the right minor fissure and right lower lobe (unchanged compared to April 2017); left lower lobe nodule (unchanged compared to April 2017); left apical nodular density has resolved compared to April 2018  Chest CT (2/27/20): No LAD; centrilobular emphysema; consolidation in the right middle lobe, right lower lobe and lingula; infiltrate in the left lower lobe and left upper lobe      ECHO (9/2/20)  Summary   Left ventricular cavity size is normal.   There is moderate concentric left ventricular hypertrophy. Ejection fraction is visually estimated to be 55-60%.    No regional wall motion abnormalities are noted. Mitral valve leaflets appear mildly thickened. Mitral annular calcification is present. Trivial mitral regurgitation is present. Aortic valve leaflets appear thickened. Trivial aortic regurgitation is present. No evidence of aortic valve stenosis. Lab Results   Component Value Date    WBC 11.0 07/20/2020    HGB 11.1 (L) 07/20/2020    HCT 35.4 (L) 07/20/2020    MCV 84.3 07/20/2020     07/20/2020       No results found for: BNP    Lab Results   Component Value Date    CREATININE 0.5 (L) 02/27/2020    BUN 17 02/27/2020     02/27/2020    K 4.3 02/27/2020     02/27/2020    CO2 24 02/27/2020   No allergies  IgE- 6  Alpha 1 antitrypsin (3/4/20): M1M2- 171 (normal)      Assessment/Plan:79y.o. year old female presents for follow up. COPD- She will continue Wixela twice daily and Spiriva daily. Continue DuoNebs, proair and 3% saline nebulizers as needed. She will continue theophylline 300 mg daily and prednisone 10 mg daily along with omeprazole, calcium and vitamin D. Will obtain a new nebulizer machine for her as her current machine is no longer working. Acute bronchitis- Will give 5 days of azithromycin and a 9-day taper of prednisone with 1 refill. Chronic hypoxic respiratory failure- She will continue 5 to 9 L of oxygen to maintain an oxygen saturation greater than 90%. She is to return for follow-up in 3 months.       Mikie Hernandez MD  [facility] Pulmonology, Critical Care and Sleep

## 2021-01-20 ENCOUNTER — IMMUNIZATION (OUTPATIENT)
Dept: PRIMARY CARE CLINIC | Age: 81
End: 2021-01-20
Payer: MEDICARE

## 2021-01-20 PROCEDURE — 0001A COVID-19, PFIZER VACCINE 30MCG/0.3ML DOSE: CPT | Performed by: FAMILY MEDICINE

## 2021-01-20 PROCEDURE — 91300 COVID-19, PFIZER VACCINE 30MCG/0.3ML DOSE: CPT | Performed by: FAMILY MEDICINE

## 2021-01-28 RX ORDER — LEVOTHYROXINE SODIUM 0.1 MG/1
TABLET ORAL
Qty: 90 TABLET | Refills: 1 | Status: SHIPPED | OUTPATIENT
Start: 2021-01-28 | End: 2021-07-21

## 2021-01-28 RX ORDER — METFORMIN HYDROCHLORIDE 500 MG/1
TABLET, EXTENDED RELEASE ORAL
Qty: 90 TABLET | Refills: 1 | Status: SHIPPED | OUTPATIENT
Start: 2021-01-28 | End: 2021-04-28

## 2021-01-28 NOTE — TELEPHONE ENCOUNTER
LOV: 11/24/20    Future Appointments   Date Time Provider Omar Barros   2/10/2021  1:30  [facility] DAY SECOND DOSE [facility] IMM [facility]   2/24/2021  3:30 PM Ermias Leahy IM [facility]   4/12/2021  1:00 PM Maria L Esquivel MD [facility]

## 2021-02-10 ENCOUNTER — IMMUNIZATION (OUTPATIENT)
Dept: PRIMARY CARE CLINIC | Age: 81
End: 2021-02-10
Payer: MEDICARE

## 2021-02-10 PROCEDURE — 0002A COVID-19, PFIZER VACCINE 30MCG/0.3ML DOSE: CPT | Performed by: FAMILY MEDICINE

## 2021-02-10 PROCEDURE — 91300 COVID-19, PFIZER VACCINE 30MCG/0.3ML DOSE: CPT | Performed by: FAMILY MEDICINE

## 2021-02-10 RX ORDER — TIOTROPIUM BROMIDE 18 UG/1
CAPSULE ORAL; RESPIRATORY (INHALATION)
Qty: 90 CAPSULE | Refills: 1 | Status: SHIPPED | OUTPATIENT
Start: 2021-02-10 | End: 2021-08-26 | Stop reason: ALTCHOICE

## 2021-02-10 RX ORDER — OMEPRAZOLE 40 MG/1
CAPSULE, DELAYED RELEASE ORAL
Qty: 90 CAPSULE | Refills: 1 | Status: SHIPPED | OUTPATIENT
Start: 2021-02-10 | End: 2021-07-21

## 2021-02-10 NOTE — TELEPHONE ENCOUNTER
Last office visit : 11/24/2020    Future Appointments   Date Time Provider Omar Barros   2/10/2021  1:30  [address] IMM, PFIZER C/ [address] W[facility]   2/24/2021  3:30 PM Dianna Brown MD [facility]   4/12/2021  1:00 PM Barb Garcia MD [facility]

## 2021-02-24 ENCOUNTER — OFFICE VISIT (OUTPATIENT)
Dept: INTERNAL MEDICINE CLINIC | Age: 81
End: 2021-02-24
Payer: MEDICARE

## 2021-02-24 VITALS
OXYGEN SATURATION: 91 % | HEIGHT: 63 IN | HEART RATE: 115 BPM | DIASTOLIC BLOOD PRESSURE: 80 MMHG | SYSTOLIC BLOOD PRESSURE: 120 MMHG | RESPIRATION RATE: 16 BRPM | BODY MASS INDEX: 24.27 KG/M2 | WEIGHT: 137 LBS | TEMPERATURE: 97.9 F

## 2021-02-24 DIAGNOSIS — J44.9 COPD, SEVERE (HCC): Primary | ICD-10-CM

## 2021-02-24 DIAGNOSIS — K21.9 GASTROESOPHAGEAL REFLUX DISEASE WITHOUT ESOPHAGITIS: ICD-10-CM

## 2021-02-24 DIAGNOSIS — J96.11 CHRONIC RESPIRATORY FAILURE WITH HYPOXIA (HCC): ICD-10-CM

## 2021-02-24 PROCEDURE — G8399 PT W/DXA RESULTS DOCUMENT: HCPCS | Performed by: INTERNAL MEDICINE

## 2021-02-24 PROCEDURE — G8484 FLU IMMUNIZE NO ADMIN: HCPCS | Performed by: INTERNAL MEDICINE

## 2021-02-24 PROCEDURE — G8420 CALC BMI NORM PARAMETERS: HCPCS | Performed by: INTERNAL MEDICINE

## 2021-02-24 PROCEDURE — G8427 DOCREV CUR MEDS BY ELIG CLIN: HCPCS | Performed by: INTERNAL MEDICINE

## 2021-02-24 PROCEDURE — 99213 OFFICE O/P EST LOW 20 MIN: CPT | Performed by: INTERNAL MEDICINE

## 2021-02-24 PROCEDURE — 4040F PNEUMOC VAC/ADMIN/RCVD: CPT | Performed by: INTERNAL MEDICINE

## 2021-02-24 PROCEDURE — G8926 SPIRO NO PERF OR DOC: HCPCS | Performed by: INTERNAL MEDICINE

## 2021-02-24 PROCEDURE — 1090F PRES/ABSN URINE INCON ASSESS: CPT | Performed by: INTERNAL MEDICINE

## 2021-02-24 PROCEDURE — 1123F ACP DISCUSS/DSCN MKR DOCD: CPT | Performed by: INTERNAL MEDICINE

## 2021-02-24 PROCEDURE — 3023F SPIROM DOC REV: CPT | Performed by: INTERNAL MEDICINE

## 2021-02-24 PROCEDURE — 1036F TOBACCO NON-USER: CPT | Performed by: INTERNAL MEDICINE

## 2021-02-24 ASSESSMENT — ENCOUNTER SYMPTOMS
COUGH: 1
SHORTNESS OF BREATH: 1
CHEST TIGHTNESS: 0
GASTROINTESTINAL NEGATIVE: 1
WHEEZING: 1
TROUBLE SWALLOWING: 0

## 2021-02-24 ASSESSMENT — PATIENT HEALTH QUESTIONNAIRE - PHQ9: SUM OF ALL RESPONSES TO PHQ QUESTIONS 1-9: 0

## 2021-02-24 NOTE — PROGRESS NOTES
Subjective:      Patient ID: Krista Go is a 79 y.o. female. Chief Complaint   Patient presents with    COPD        HPI  Just finished steroids and antibiotics in last 2 weeks    Still has chronic yellow sputum   Has exertional dyspnea   Having more indigestion and gets cramps in ribs   Has no gi or gu symptoms and is on miralax   Review of Systems   Constitutional: Negative for chills and fever. HENT: Negative for trouble swallowing. Respiratory: Positive for cough, shortness of breath and wheezing. Negative for chest tightness. Cardiovascular: Negative for chest pain, palpitations and leg swelling. Gastrointestinal: Negative. Genitourinary: Negative. Neurological: Negative for dizziness, light-headedness and headaches.        Current Outpatient Medications   Medication Sig Dispense Refill    omeprazole (PRILOSEC) 40 MG delayed release capsule TAKE 1 CAPSULE EVERY DAY 90 capsule 1    SPIRIVA HANDIHALER 18 MCG inhalation capsule INHALE THE CONTENTS OF 1 CAPSULE DAILY 90 capsule 1    levothyroxine (SYNTHROID) 100 MCG tablet TAKE 1 TABLET EVERY DAY 90 tablet 1    metFORMIN (GLUCOPHAGE-XR) 500 MG extended release tablet TAKE 1 TABLET EVERY DAY WITH BREAKFAST 90 tablet 1    predniSONE (DELTASONE) 20 MG tablet Take 60 mg for 3 days, then 40 mg for 3 days, then 20 mg for 3 days, 18 tablet 1    fluticasone-salmeterol (WIXELA INHUB) 500-50 MCG/DOSE diskus inhaler INHALE 1 PUFF TWICE DAILY 3 Inhaler 1    albuterol sulfate  (90 Base) MCG/ACT inhaler Inhale 2 puffs into the lungs every 4 hours as needed for Wheezing or Shortness of Breath 1 Inhaler 5    atorvastatin (LIPITOR) 20 MG tablet TAKE 1 TABLET EVERY DAY 90 tablet 1    theophylline (RAFA-24) 300 MG extended release capsule Take 1 capsule by mouth daily 90 capsule 2    montelukast (SINGULAIR) 10 MG tablet TAKE 1 TABLET NIGHTLY 90 tablet 3    ipratropium-albuterol (DUONEB) 0.5-2.5 (3) MG/3ML SOLN nebulizer solution INHALE THREE MILLILITERS VIA NEBULIZATION BY MOUTH EVERY 4 HOURS 540 mL 3    predniSONE (DELTASONE) 10 MG tablet TAKE 1 TABLET EVERY DAY 90 tablet 3    Cyanocobalamin (VITAMIN B 12 PO) Take 1 tablet by mouth daily      Glucos-MSM-C-Mj-Wuhbpp-Yhpwoe (GLUCOSAMINE MSM COMPLEX PO) Take 1 tablet by mouth 2 times daily      Lancets MISC 1 each by Does not apply route daily 100 each 3    blood glucose monitor strips Test 1  time a day & as needed for symptoms of irregular blood glucose. 100 strip 3    aspirin 81 MG tablet Take 81 mg by mouth 3 times a week      Calcium Carb-Cholecalciferol 600-500 MG-UNIT CAPS Take 1 tablet by mouth 2 times daily 1 capsule 0    albuterol (PROVENTIL) (2.5 MG/3ML) 0.083% nebulizer solution Take 3 mLs by nebulization 2 times daily 180 mL 11    saline nasal gel (AYR) GEL APPLY INSIDE BOTH NOSTRILS TWO TIMES A DAY 1 Tube 2    Misc. Devices (ACAPELLA) MISC 1 Device by Does not apply route daily 1 each 0    sodium chloride 3 % nebulizer solution Take 15 mLs by nebulization as needed for Other or Cough 300 mL 1     No current facility-administered medications for this visit. No results for input(s): WBC, HGB, HCT, MCV, PLT in the last 720 hours.      Lab Results   Component Value Date     02/27/2020    K 4.3 02/27/2020     02/27/2020    CO2 24 02/27/2020    BUN 17 02/27/2020    CREATININE 0.5 02/27/2020    GLUCOSE 167 02/27/2020    CALCIUM 9.5 02/27/2020        Lab Results   Component Value Date    CHOL 188 04/26/2019    CHOL 139 12/08/2017    CHOL 146 11/15/2016     Lab Results   Component Value Date    TRIG 402 (H) 04/26/2019    TRIG 175 (H) 12/08/2017    TRIG 125 11/15/2016     Lab Results   Component Value Date    HDL 78 (H) 04/26/2019    HDL 71 (H) 12/08/2017    HDL 72 (H) 11/15/2016     Lab Results   Component Value Date    LDLCALC see below 04/26/2019    LDLCALC 33 12/08/2017    LDLCALC 49 11/15/2016     Lab Results   Component Value Date    LABVLDL see below 04/26/2019 LABVLDL 35 12/08/2017    LABVLDL 25 11/15/2016     No results found for: PIYUSH     Objective:   Physical Exam  Vitals signs and nursing note reviewed. Constitutional:       General: She is not in acute distress. HENT:      Mouth/Throat:      Mouth: Mucous membranes are moist.      Pharynx: Oropharynx is clear. Eyes:      General: No scleral icterus. Extraocular Movements: Extraocular movements intact. Conjunctiva/sclera: Conjunctivae normal.      Pupils: Pupils are equal, round, and reactive to light. Neck:      Thyroid: No thyromegaly. Vascular: No carotid bruit or JVD. Cardiovascular:      Rate and Rhythm: Normal rate and regular rhythm. Pulses: Normal pulses. Heart sounds: Normal heart sounds. No murmur. No gallop. Pulmonary:      Effort: No respiratory distress. Breath sounds: Normal breath sounds. No wheezing, rhonchi or rales. Musculoskeletal:      Right lower leg: No edema. Left lower leg: No edema. Lymphadenopathy:      Cervical: No cervical adenopathy. Neurological:      Mental Status: She is alert and oriented to person, place, and time.          Assessment:      Copd and is on high flow oxygen  gerd -not well controlled due to steroids and theophylline  CRF and is on oxygen      Plan:      Continue oxygen  Follow  diet bettor to help herheart burn symptoms  Continue current medications  See in 3 months        Keira Mireles MD

## 2021-02-24 NOTE — PATIENT INSTRUCTIONS
Continue oxygen  Follow  diet brandee to help herheart burn symptoms  Continue current medications  See in 3 months

## 2021-03-10 ENCOUNTER — TELEPHONE (OUTPATIENT)
Dept: PULMONOLOGY | Age: 81
End: 2021-03-10

## 2021-03-10 RX ORDER — DOXYCYCLINE HYCLATE 100 MG
100 TABLET ORAL 2 TIMES DAILY
Qty: 14 TABLET | Refills: 0 | Status: SHIPPED | OUTPATIENT
Start: 2021-03-10 | End: 2021-03-17

## 2021-03-10 RX ORDER — PREDNISONE 20 MG/1
TABLET ORAL
Qty: 15 TABLET | Refills: 0 | Status: SHIPPED | OUTPATIENT
Start: 2021-03-10 | End: 2021-03-23 | Stop reason: SDUPTHER

## 2021-03-10 NOTE — TELEPHONE ENCOUNTER
I have sent prescriptions for 10 days of prednisone and 7 days of doxycycline, however, if she does not feel better or feels worse she has to go to the emergency room.

## 2021-03-23 ENCOUNTER — TELEPHONE (OUTPATIENT)
Dept: PULMONOLOGY | Age: 81
End: 2021-03-23

## 2021-03-23 RX ORDER — PREDNISONE 20 MG/1
TABLET ORAL
Qty: 15 TABLET | Refills: 0 | Status: SHIPPED | OUTPATIENT
Start: 2021-03-23 | End: 2021-04-12

## 2021-03-23 NOTE — TELEPHONE ENCOUNTER
Patient is calling to let Dr. Wesley Sanchez know that the medication that we had sent in for her on 3- helped her however yesterday she started feeling shortness of breath again. She is wanting to let Dr. Wesley Sanchez know this. Please call patient back once this message is addressed.  [phone]

## 2021-03-23 NOTE — TELEPHONE ENCOUNTER
I have sent a prescription for another 10 day taper of prednisone. She should let me know if she does not feel better.

## 2021-03-23 NOTE — TELEPHONE ENCOUNTER
No other symptoms. Patient states she is using her inhaler.  She gets better for an hour after she uses her inhalers in the am.

## 2021-04-05 ENCOUNTER — TELEPHONE (OUTPATIENT)
Dept: PULMONOLOGY | Age: 81
End: 2021-04-05

## 2021-04-05 RX ORDER — PREDNISONE 20 MG/1
20 TABLET ORAL DAILY
Qty: 7 TABLET | Refills: 0 | Status: SHIPPED | OUTPATIENT
Start: 2021-04-05 | End: 2021-04-12

## 2021-04-05 NOTE — TELEPHONE ENCOUNTER
Patient was told to call the office regarding how her prednisone is working for her she stated that on the 7th day she was feeling better, however the day after she finished the regimen (11th day) her SOB started again. Patient stated that she has an appt with Dr Donald Sarah on Monday 4/12/21.      Please call patient back once this message is addressed, [phone]

## 2021-04-05 NOTE — TELEPHONE ENCOUNTER
I would like her to take prednisone 20mg daily instead of 10mg daily until she comes to see me next week. I have sent a prescription.

## 2021-04-07 RX ORDER — ATORVASTATIN CALCIUM 20 MG/1
TABLET, FILM COATED ORAL
Qty: 90 TABLET | Refills: 1 | Status: SHIPPED | OUTPATIENT
Start: 2021-04-07

## 2021-04-07 NOTE — TELEPHONE ENCOUNTER
Future Appointments   Date Time Provider Omar Fiorella   4/12/2021  1:00 PM Nicolas Christina MD [facility]   5/24/2021  1:30 PM Lisa Garvin MD [facility] IM [facility]      Last ov 2/24//21

## 2021-04-12 ENCOUNTER — OFFICE VISIT (OUTPATIENT)
Dept: PULMONOLOGY | Age: 81
End: 2021-04-12
Payer: MEDICARE

## 2021-04-12 VITALS
WEIGHT: 138 LBS | SYSTOLIC BLOOD PRESSURE: 130 MMHG | RESPIRATION RATE: 16 BRPM | TEMPERATURE: 97.5 F | BODY MASS INDEX: 24.45 KG/M2 | OXYGEN SATURATION: 95 % | HEART RATE: 103 BPM | DIASTOLIC BLOOD PRESSURE: 78 MMHG | HEIGHT: 63 IN

## 2021-04-12 DIAGNOSIS — J43.2 CENTRILOBULAR EMPHYSEMA (HCC): Primary | ICD-10-CM

## 2021-04-12 DIAGNOSIS — J96.11 CHRONIC RESPIRATORY FAILURE WITH HYPOXIA (HCC): ICD-10-CM

## 2021-04-12 PROCEDURE — G8427 DOCREV CUR MEDS BY ELIG CLIN: HCPCS | Performed by: INTERNAL MEDICINE

## 2021-04-12 PROCEDURE — 3023F SPIROM DOC REV: CPT | Performed by: INTERNAL MEDICINE

## 2021-04-12 PROCEDURE — 99214 OFFICE O/P EST MOD 30 MIN: CPT | Performed by: INTERNAL MEDICINE

## 2021-04-12 PROCEDURE — G8399 PT W/DXA RESULTS DOCUMENT: HCPCS | Performed by: INTERNAL MEDICINE

## 2021-04-12 PROCEDURE — 1036F TOBACCO NON-USER: CPT | Performed by: INTERNAL MEDICINE

## 2021-04-12 PROCEDURE — G8420 CALC BMI NORM PARAMETERS: HCPCS | Performed by: INTERNAL MEDICINE

## 2021-04-12 PROCEDURE — G8926 SPIRO NO PERF OR DOC: HCPCS | Performed by: INTERNAL MEDICINE

## 2021-04-12 PROCEDURE — 4040F PNEUMOC VAC/ADMIN/RCVD: CPT | Performed by: INTERNAL MEDICINE

## 2021-04-12 PROCEDURE — 1123F ACP DISCUSS/DSCN MKR DOCD: CPT | Performed by: INTERNAL MEDICINE

## 2021-04-12 PROCEDURE — 1090F PRES/ABSN URINE INCON ASSESS: CPT | Performed by: INTERNAL MEDICINE

## 2021-04-12 RX ORDER — PREDNISONE 10 MG/1
15 TABLET ORAL DAILY
Qty: 135 TABLET | Refills: 2 | Status: SHIPPED | OUTPATIENT
Start: 2021-04-12

## 2021-04-12 NOTE — PROGRESS NOTES
Chief complaint  This is a 79y.o. year old female  who presents with a chief complaint of   Chief Complaint   Patient presents with    COPD       HPI  24-year-old woman with moderate COPD (FEV1 1.36 L, 61% predicted), chronic hypoxic respiratory failure and Mycobacterium avium complex infection (completed 18 months of therapy) presents for follow-up. She continues to be troubled by limiting shortness of breath. Prednisone sometimes helps with her breathing, but she felt that the last round of steroids did not. She has to stop and rest after walking about 7 feet. Her breathing improves when she is at rest.  She has no significant cough. She is compliant with Wixela and Spiriva. She uses albuterol inhaler many times a day and DuoNebs about 3 times a day. She is on theophylline 300 mg daily and prednisone 10 mg daily. She uses 6 L of oxygen with her portable tanks and 9 L of oxygen when she is at home. She completed pulmonary rehabilitation. Past Medical History:   Diagnosis Date    COPD (chronic obstructive pulmonary disease) ([facility] [address]     GERD (gastroesophageal reflux disease)     High cholesterol     Osteoporosis        Past Surgical History:   Procedure Laterality Date     SECTION      4 times    COLONOSCOPY  .     redundant colon       Current Outpatient Medications   Medication Sig Dispense Refill    predniSONE (DELTASONE) 10 MG tablet Take 1.5 tablets by mouth daily 135 tablet 2    atorvastatin (LIPITOR) 20 MG tablet TAKE 1 TABLET EVERY DAY 90 tablet 1    predniSONE (DELTASONE) 10 MG tablet Take 1 tablet by mouth daily 90 tablet 3    omeprazole (PRILOSEC) 40 MG delayed release capsule TAKE 1 CAPSULE EVERY DAY 90 capsule 1    SPIRIVA HANDIHALER 18 MCG inhalation capsule INHALE THE CONTENTS OF 1 CAPSULE DAILY 90 capsule 1    levothyroxine (SYNTHROID) 100 MCG tablet TAKE 1 TABLET EVERY DAY 90 tablet 1    metFORMIN (GLUCOPHAGE-XR) 500 MG extended release tablet TAKE 1 TABLET EVERY DAY WITH BREAKFAST 90 tablet 1    predniSONE (DELTASONE) 20 MG tablet Take 60 mg for 3 days, then 40 mg for 3 days, then 20 mg for 3 days, 18 tablet 1    fluticasone-salmeterol (WIXELA INHUB) 500-50 MCG/DOSE diskus inhaler INHALE 1 PUFF TWICE DAILY 3 Inhaler 1    albuterol sulfate  (90 Base) MCG/ACT inhaler Inhale 2 puffs into the lungs every 4 hours as needed for Wheezing or Shortness of Breath 1 Inhaler 5    theophylline (RAFA-24) 300 MG extended release capsule Take 1 capsule by mouth daily 90 capsule 2    montelukast (SINGULAIR) 10 MG tablet TAKE 1 TABLET NIGHTLY 90 tablet 3    ipratropium-albuterol (DUONEB) 0.5-2.5 (3) MG/3ML SOLN nebulizer solution INHALE THREE MILLILITERS VIA NEBULIZATION BY MOUTH EVERY 4 HOURS 540 mL 3    Cyanocobalamin (VITAMIN B 12 PO) Take 1 tablet by mouth daily      Glucos-MSM-C-Pi-Uhcojo-Eqxqph (GLUCOSAMINE MSM COMPLEX PO) Take 1 tablet by mouth 2 times daily      Lancets MISC 1 each by Does not apply route daily 100 each 3    blood glucose monitor strips Test 1  time a day & as needed for symptoms of irregular blood glucose. 100 strip 3    aspirin 81 MG tablet Take 81 mg by mouth 3 times a week      Calcium Carb-Cholecalciferol 600-500 MG-UNIT CAPS Take 1 tablet by mouth 2 times daily 1 capsule 0    albuterol (PROVENTIL) (2.5 MG/3ML) 0.083% nebulizer solution Take 3 mLs by nebulization 2 times daily 180 mL 11    saline nasal gel (AYR) GEL APPLY INSIDE BOTH NOSTRILS TWO TIMES A DAY 1 Tube 2    Misc. Devices (ACAPELLA) MISC 1 Device by Does not apply route daily 1 each 0    sodium chloride 3 % nebulizer solution Take 15 mLs by nebulization as needed for Other or Cough 300 mL 1     No current facility-administered medications for this visit.         Allergies   Allergen Reactions    Sulfa Antibiotics        Family History   Problem Relation Age of Onset    Diabetes Sister         x's 2    Other Sister         cad   Ezekiel Cormier unspecified formulation 10/29/2014, 10/12/2015, 11/15/2016    Influenza Virus Vaccine 10/01/2008, 11/01/2014    Influenza Whole 10/01/2008    Influenza, High Dose (Fluzone 65 yrs and older) 10/01/2008, 10/12/2015, 11/15/2016, 10/19/2018, 10/28/2019    Influenza, Jaime Miles, Recombinant, IM PF (Flublok 18 yrs and older) 10/28/2019    Influenza, Quadv, adjuvanted, 65 yrs +, IM, PF (Fluad) 09/15/2020    Pneumococcal Conjugate 13-valent ([id number]) 04/15/2016    Pneumococcal Conjugate 7-valent (Prevnar7) 11/01/2005, 10/11/2010    Pneumococcal Polysaccharide ([id number]) 11/01/2005, 03/10/2017    Td (Adult), 2 Lf Tetanus Toxoid, Pf (Td, Absorbed) 08/05/2020       ROS:  GENERAL:  No fevers, no chills, no weight loss or gain   RESPIRATORY:  +exertional shortness of breath, no cough      PHYSICAL EXAM:  Vitals:    04/12/21 1301   BP: 130/78   Site: Right Upper Arm   Position: Sitting   Cuff Size: Medium Adult   Pulse: 103   Resp: 16   Temp: 97.5 °F (36.4 °C)   TempSrc: Temporal   SpO2: 95%   Weight: 138 lb (62.6 kg)   Height: 5' 3\" (1.6 m)   on 6L NC    Gen: Well developed; well nourished  Eyes: No scleral icterus. No conjunctival injection. ENT:  Oropharynx clear. External appearance of ears and nose normal.  Neck: Trachea midline. No obvious mass. No visible thyroid enlargement    Respiratory: Clear to auscultation bilaterally, no accessory muscle use  Cardiovascular: Regular rate and rhythm, no appreciable murmurs. No edema. Gastrointestinal: Soft, non-tender. No hernia  Skin: Warm and dry. No rashes or ulcers on visible areas. Normal texture and turgor  Lymphatic: No cervical LAD. No supraclavicular LAD. Musculoskeletal: No cyanosis, clubbing or joint deformity.     Psychiatric: Normal mood and affect; exhibits normal insight and judgement       Data reviewed:  Pulmonary Function Testing (2/8/19)  FVC 2.26 L at 93% predicted ---> 2.28L at 94% predicted  FEV1 1.05 L at 56% predicted -----> 1.14L at 61% predicted  FEV1/FVC ratio at 46%----> 50%  TLC 4.64 L at 95% predicted  VC 2.41L at 99% predicted  ERV 0.86L at 106% predicted  RV/TLC at 48% predicted  DLCO 4.37 at 19% predicted  DLCO/VA 1.19L at 25 % predicted    Overall: Moderate lower airflow obstruction without significant reversal; lung volumes are normal; diffusing capacity is severely reduced and does not normalize when averaged over lung volumes (compared to the study in June 2016 flow measurements and vital capacity have significantly declined, other findings have not significantly changed)          Six minute walk test:  10/23/18- Walked 244m, 61% predicted (reduced); iwona saturation 86% on RA---> 90% on 8L NC  2/19/19- Walked 244m, 62% predicted (reduced); 90% on 8L NC      Images and reports of chest imaging were reviewed by me. My interpretation is:  CXR (2/27/20): Infiltrate in the right lower lobe; hyperinflated chest  CXR (7/20/20): Hyperinflated chest.  Compared to the study in February 2020 the right lower lobe infiltrate has resolved  Chest CT (7/25/18): No LAD; centrilobular emphysema; nodular density in the right minor fissure and right lower lobe (unchanged compared to April 2017); left lower lobe nodule (unchanged compared to April 2017); left apical nodular density has resolved compared to April 2018  Chest CT (2/27/20): No LAD; centrilobular emphysema; consolidation in the right middle lobe, right lower lobe and lingula; infiltrate in the left lower lobe and left upper lobe      ECHO (9/2/20)  Summary   Left ventricular cavity size is normal.   There is moderate concentric left ventricular hypertrophy. Ejection fraction is visually estimated to be 55-60%. No regional wall motion abnormalities are noted. Mitral valve leaflets appear mildly thickened. Mitral annular calcification is present. Trivial mitral regurgitation is present. Aortic valve leaflets appear thickened. Trivial aortic regurgitation is present.    No evidence of aortic valve stenosis. Lab Results   Component Value Date    WBC 11.0 07/20/2020    HGB 11.1 (L) 07/20/2020    HCT 35.4 (L) 07/20/2020    MCV 84.3 07/20/2020     07/20/2020       No results found for: BNP    Lab Results   Component Value Date    CREATININE 0.5 (L) 02/27/2020    BUN 17 02/27/2020     02/27/2020    K 4.3 02/27/2020     02/27/2020    CO2 24 02/27/2020   No allergies  IgE- 6  Alpha 1 antitrypsin (3/4/20): M1M2- 171 (normal)      Assessment/Plan:79y.o. year old female presents for follow up. COPD- This is worsening. Will increase prednisone from 10 mg daily to 15 mg daily. Continue omeprazole, calcium and vitamin D. Continue Wixela and Spiriva. Continue DuoNebs, albuterol inhaler and 3% saline nebulizers as needed. Continue theophylline 300 mg daily. Chronic hypoxic respiratory failure- She will continue 6 to 9 L of oxygen to maintain an oxygen saturation of at least 90%. She is to return for follow-up in 2 months.       Alvin Herrera MD  [facility] Pulmonology, Critical Care and Sleep

## 2021-04-28 ENCOUNTER — TELEPHONE (OUTPATIENT)
Dept: INTERNAL MEDICINE CLINIC | Age: 81
End: 2021-04-28

## 2021-04-28 RX ORDER — METFORMIN HYDROCHLORIDE 500 MG/1
1000 TABLET, EXTENDED RELEASE ORAL
Qty: 1 TABLET | Refills: 0
Start: 2021-04-28 | End: 2021-05-03 | Stop reason: SDUPTHER

## 2021-04-28 NOTE — TELEPHONE ENCOUNTER
Patient says her sugars levels is running very high. Yesterday morning 134, afternoon before lunch 200, 129 before dinner. Today's 110 morning before breakfast, 167 before lunch. She is very concern.     Please Advise

## 2021-04-29 ENCOUNTER — VIRTUAL VISIT (OUTPATIENT)
Dept: INTERNAL MEDICINE CLINIC | Age: 81
End: 2021-04-29
Payer: MEDICARE

## 2021-04-29 DIAGNOSIS — Z00.00 ROUTINE GENERAL MEDICAL EXAMINATION AT A HEALTH CARE FACILITY: Primary | ICD-10-CM

## 2021-04-29 PROCEDURE — 4040F PNEUMOC VAC/ADMIN/RCVD: CPT | Performed by: NURSE PRACTITIONER

## 2021-04-29 PROCEDURE — G0438 PPPS, INITIAL VISIT: HCPCS | Performed by: NURSE PRACTITIONER

## 2021-04-29 PROCEDURE — 1123F ACP DISCUSS/DSCN MKR DOCD: CPT | Performed by: NURSE PRACTITIONER

## 2021-04-29 ASSESSMENT — LIFESTYLE VARIABLES
HOW OFTEN DO YOU HAVE A DRINK CONTAINING ALCOHOL: NEVER
AUDIT TOTAL SCORE: INCOMPLETE
AUDIT-C TOTAL SCORE: INCOMPLETE

## 2021-04-29 ASSESSMENT — PATIENT HEALTH QUESTIONNAIRE - PHQ9
SUM OF ALL RESPONSES TO PHQ9 QUESTIONS 1 & 2: 1
1. LITTLE INTEREST OR PLEASURE IN DOING THINGS: 0
SUM OF ALL RESPONSES TO PHQ QUESTIONS 1-9: 1

## 2021-04-29 NOTE — PATIENT INSTRUCTIONS
Personalized Preventive Plan for Martine Lawson - 4/29/2021  Medicare offers a range of preventive health benefits. Some of the tests and screenings are paid in full while other may be subject to a deductible, co-insurance, and/or copay. Some of these benefits include a comprehensive review of your medical history including lifestyle, illnesses that may run in your family, and various assessments and screenings as appropriate. After reviewing your medical record and screening and assessments performed today your provider may have ordered immunizations, labs, imaging, and/or referrals for you. A list of these orders (if applicable) as well as your Preventive Care list are included within your After Visit Summary for your review. Other Preventive Recommendations:    · A preventive eye exam performed by an eye specialist is recommended every 1-2 years to screen for glaucoma; cataracts, macular degeneration, and other eye disorders. · A preventive dental visit is recommended every 6 months. · Try to get at least 150 minutes of exercise per week or 10,000 steps per day on a pedometer . · Order or download the FREE \"Exercise & Physical Activity: Your Everyday Guide\" from [employer]. Call 1-146.592.8533 or search [employer] online. · You need 2628-7115 mg of calcium and 7640-6820 IU of vitamin D per day. It is possible to meet your calcium requirement with diet alone, but a vitamin D supplement is usually necessary to meet this goal.  · When exposed to the sun, use a sunscreen that protects against both UVA and UVB radiation with an SPF of 30 or greater. Reapply every 2 to 3 hours or after sweating, drying off with a towel, or swimming. · Always wear a seat belt when traveling in a car. Always wear a helmet when riding a bicycle or motorcycle.

## 2021-04-29 NOTE — PROGRESS NOTES
Medicare Annual Wellness Visit  Are Name: Mode Melara Date: 2021   MRN: <[id number]> Sex: Female   Age: 79 y.o. Ethnicity: Non-/Non    : 1940 Race: Jefry Skaggs is here for Medicare AWV    Screenings for behavioral, psychosocial and functional/safety risks, and cognitive dysfunction are all negative except as indicated below. These results, as well as other patient data from the [address] form, are documented in Flowsheets linked to this Encounter. Allergies   Allergen Reactions    Sulfa Antibiotics        Prior to Visit Medications    Medication Sig Taking?  Authorizing Provider   ipratropium-albuterol (DUONEB) 0.5-2.5 (3) MG/3ML SOLN nebulizer solution Inhale 3 mLs into the lungs every 4 hours Yes Dana Campbell MD   metFORMIN (GLUCOPHAGE-XR) 500 MG extended release tablet Take 2 tablets by mouth daily (with breakfast) Yes Guicho Mckeon MD   predniSONE (DELTASONE) 10 MG tablet Take 1.5 tablets by mouth daily Yes Dana Campbell MD   atorvastatin (LIPITOR) 20 MG tablet TAKE 1 TABLET EVERY DAY Yes Guicho Mckeon MD   predniSONE (DELTASONE) 10 MG tablet Take 1 tablet by mouth daily Yes Dana Campbell MD   omeprazole (PRILOSEC) 40 MG delayed release capsule TAKE 1 CAPSULE EVERY DAY Yes Guicho Mckeon MD   SPIRIVA HANDIHALER 18 MCG inhalation capsule INHALE THE CONTENTS OF 1 CAPSULE DAILY Yes Guicho Mckeon MD   fluticasone-salmeterol [facility]) 500-50 MCG/DOSE diskus inhaler INHALE 1 PUFF TWICE DAILY Yes Guicho Mckeon MD   albuterol sulfate  (90 Base) MCG/ACT inhaler Inhale 2 puffs into the lungs every 4 hours as needed for Wheezing or Shortness of Breath Yes Dana Campbell MD   theophylline (RAFA-24) 300 MG extended release capsule Take 1 capsule by mouth daily Yes Dana Campbell MD   montelukast (SINGULAIR) 10 MG tablet TAKE 1 TABLET NIGHTLY Yes Guicho Mckeon MD   Cyanocobalamin (VITAMIN B 12 PO) Take 1 tablet by mouth daily Yes Historical Provider, MD   Glucos-MSM-C-Ng-Iewzwh-Lfyqid (GLUCOSAMINE MSM COMPLEX PO) Take 1 tablet by mouth 2 times daily Yes Historical Provider, MD   Lancets MISC 1 each by Does not apply route daily Yes Carlos Alberto Black MD   blood glucose monitor strips Test 1  time a day & as needed for symptoms of irregular blood glucose. Yes Carlos Alberto Black MD   aspirin 81 MG tablet Take 81 mg by mouth 3 times a week Yes Historical Provider, MD   Calcium Carb-Cholecalciferol 600-500 MG-UNIT CAPS Take 1 tablet by mouth 2 times daily Yes Carlos Alberto Black MD   albuterol (PROVENTIL) (2.5 MG/3ML) 0.083% nebulizer solution Take 3 mLs by nebulization 2 times daily Yes Alberto Velasquez MD   saline nasal gel (AYR) GEL APPLY INSIDE BOTH NOSTRILS TWO TIMES A DAY Yes Sandford Kayser, MD   Misc. Devices (ACAPELLA) MISC 1 Device by Does not apply route daily Yes Alberto Velasquez MD   sodium chloride 3 % nebulizer solution Take 15 mLs by nebulization as needed for Other or Cough Yes Janene Bell MD   levothyroxine (SYNTHROID) 100 MCG tablet TAKE 1 TABLET EVERY DAY  Carlos Alberto Black MD   predniSONE (DELTASONE) 20 MG tablet Take 60 mg for 3 days, then 40 mg for 3 days, then 20 mg for 3 days,  Alberto Velasquez MD       Past Medical History:   Diagnosis Date    [facility]ch[facility] obstructive pulmonary disease) ([facility] [address]     GERD (gastroesophageal reflux disease)     High cholesterol     Osteoporosis        Past Surgical History:   Procedure Laterality Date     SECTION      4 times    COLONOSCOPY  .     redundant colon       Family History   Problem Relation Age of Onset    Diabetes Sister         x's 3    Other Sister         cad    High Blood Pressure Mother     Diabetes Mother     Heart Attack Mother     Other Mother         hypothyroidism    High Blood Pressure Father     Heart Attack Father     Heart Attack Maternal Aunt        CareTeam (Including outside providers/suppliers regularly involved in providing care):   Patient Care Team:  Ludivina Sebastian MD as PCP - Stevenson Auguste MD as PCP - [facility] Provider    Wt Readings from Last 3 Encounters:   04/12/21 138 lb (62.6 kg)   02/24/21 137 lb (62.1 kg)   01/18/21 137 lb 3.2 oz (62.2 kg)      No flowsheet data found. There is no height or weight on file to calculate BMI. Based upon direct observation of the patient, evaluation of cognition reveals recent and remote memory intact. Patient's complete Health Risk Assessment and screening values have been reviewed and are found in Flowsheets. The following problems were reviewed today and where indicated follow up appointments were made and/or referrals ordered. Positive Risk Factor Screenings with Interventions:          General Health and ACP:  General  In general, how would you say your health is?: Fair  In the past 7 days, have you experienced any of the following? New or Increased Pain, New or Increased Fatigue, Loneliness, Social Isolation, Stress or Anger?: (!) New or Increased Fatigue  Do you get the social and emotional support that you need?: Yes  Do you have a Living Will?: Yes  Advance Directives     Power of  Living Will ACP-Advance Directive ACP-Power of     Not on File Not on File Not on File Not on File      General Health Risk Interventions:  · Fatigue: patient advised to follow-up in this office for further evaluation and treatment within 1 week(s)   · States that she has some insomnia issues- she will address with Dr Francisca Bower next month at her visit. Advised to make an appt sooner if she needs too.       Hearing/Vision:  No exam data present  Hearing/Vision  Do you or your family notice any trouble with your hearing that hasn't been managed with hearing aids?: No  Do you have difficulty driving, watching TV, or doing any of your daily activities because of your eyesight?: (!) Yes  Have you had an eye exam within the past year?: Yes Hearing/Vision Interventions:  · Vision concerns:  patient encouraged to make appointment with his/her eye specialist     ADL:  ADLs  In the past 7 days, did you need help from others to perform any of the following everyday activities? Eating, dressing, grooming, bathing, toileting, or walking/balance?: None  In the past 7 days, did you need help from others to take care of any of the following?  Laundry, housekeeping, banking/finances, shopping, telephone use, food preparation, transportation, or taking medications?: (!) Housekeeping  ADL Interventions:  · Patient declines any further evaluation/treatment for this issue   · [place] of aging is coming today to help with housekeeping    Personalized Preventive Plan   Current Health Maintenance Status  Immunization History   Administered Date(s) Administered    COVID-19, Collazo Peter, PF, 30mcg/0.3mL 01/20/2021, 02/10/2021    Influenza Nasal 11/01/2014    Influenza Vaccine, unspecified formulation 10/29/2014, 10/12/2015, 11/15/2016    Influenza Virus Vaccine 10/01/2008, 11/01/2014    Influenza Whole 10/01/2008    Influenza, High Dose (Fluzone 65 yrs and older) 10/01/2008, 10/12/2015, 11/15/2016, 10/19/2018, 10/28/2019    Influenza, Lella Seton, Recombinant, IM PF (Flublok 18 yrs and older) 10/28/2019    Influenza, Quadv, adjuvanted, 65 yrs +, IM, PF (Fluad) 09/15/2020    Pneumococcal Conjugate 13-valent ([id number]) 04/15/2016    Pneumococcal Conjugate 7-valent (Prevnar7) 11/01/2005, 10/11/2010    Pneumococcal Polysaccharide ([id number]) 11/01/2005, 03/10/2017    Td (Adult), 2 Lf Tetanus Toxoid, Pf (Td, Absorbed) 08/05/2020        Health Maintenance   Topic Date Due    DTaP/Tdap/Td vaccine (1 - Tdap) 08/24/1959    Shingles Vaccine (1 of 2) Never done   [employer] Visit (AWV)  Never done    Lipid screen  04/26/2020    TSH testing  04/26/2020    DEXA (modify frequency per FRAX score)  Completed    Flu vaccine  Completed    Pneumococcal 65+ years Vaccine Completed    COVID-19 Vaccine  Completed    Hepatitis A vaccine  Aged Out    Hepatitis B vaccine  Aged Out    Hib vaccine  Aged Out    Meningococcal (ACWY) vaccine  Aged Out     Recommendations for [facility] Due: see orders and patient instructions/AVS.  . Recommended screening schedule for the next 5-10 years is provided to the patient in written form: see Patient Nataliya Botello was seen today for medicare awv. Diagnoses and all orders for this visit:    Routine general medical examination at a health care facility               Martine Lawson is a 79 y.o. female being evaluated by a Virtual Visit (phone) encounter to address concerns as mentioned above. A caregiver was present when appropriate. Due to this being a TeleHealth encounter (During [facility] public health emergency), evaluation of the following organ systems was limited: Vitals/Constitutional/EENT/Resp/CV/GI//MS/Neuro/Skin/Heme-Lymph-Imm. Pursuant to the emergency declaration under the [address] [place] [address], [address] [place] [address] authority and the [employer] Act, this Virtual Visit was conducted with patient's (and/or legal guardian's) consent, to reduce the patient's risk of exposure to COVID-19 and provide necessary medical care. The patient (and/or legal guardian) has also been advised to contact this office for worsening conditions or problems, and seek emergency medical treatment and/or call 911 if deemed necessary. Patient identification was verified at the start of the visit: Yes    Services were provided through phone to substitute for in-person clinic visit. Patient and provider were located at their individual homes. --FRANCESCA Wilson CNP on 4/29/2021 at 8:50 AM    An electronic signature was used to authenticate this note.

## 2021-05-03 RX ORDER — METFORMIN HYDROCHLORIDE 500 MG/1
1000 TABLET, EXTENDED RELEASE ORAL
Qty: 180 TABLET | Refills: 1 | Status: SHIPPED | OUTPATIENT
Start: 2021-05-03 | End: 2021-08-26

## 2021-05-03 RX ORDER — GLUCOSAMINE HCL/CHONDROITIN SU 500-400 MG
CAPSULE ORAL
Qty: 100 STRIP | Refills: 3 | Status: SHIPPED | OUTPATIENT
Start: 2021-05-03

## 2021-05-03 RX ORDER — LANCETS 30 GAUGE
1 EACH MISCELLANEOUS DAILY
Qty: 100 EACH | Refills: 3 | Status: SHIPPED | OUTPATIENT
Start: 2021-05-03

## 2021-05-03 NOTE — TELEPHONE ENCOUNTER
LAST OFFICE VISIT :02/24/2021    Future Appointments   Date Time Provider Omar Fiorella   5/24/2021  1:30 PM Angel Moore 668 IM [facility]   6/7/2021  1:40 PM Pauline Munoz MD [facility] PULM [facility]

## 2021-05-03 NOTE — TELEPHONE ENCOUNTER
Pt needs refill for test strips and lancets for medtrix air  Tests 3-4 times a day  Also needs refill for metformin 500mg    [facility] order

## 2021-05-10 ENCOUNTER — TELEPHONE (OUTPATIENT)
Dept: ADMINISTRATIVE | Age: 81
End: 2021-05-10

## 2021-05-10 NOTE — TELEPHONE ENCOUNTER
PA COVER MY MEDS  Medication:Spiriva HandiHaler 18MCG capsules  Key:[id number]  Rx #: [id number]  Status:PENDING        Authorization Status: Approval  Authorization Number: [place] Case: [id number]  Approved : Spiriva HandiHaler 18MCG capsules  Date Span: 01/01/2021 to 12/31/2021

## 2021-05-11 ENCOUNTER — TELEPHONE (OUTPATIENT)
Dept: INTERNAL MEDICINE CLINIC | Age: 81
End: 2021-05-11

## 2021-05-11 NOTE — TELEPHONE ENCOUNTER
Patient received a letter from [facility] regarding, the medication SPIRIVA HANDIHALER 18 MCG inhalation capsule. She says that  [facility] told her this medication, is interfering with her others medication. They also told her, they would  need a letter from the doctor, as to why this patient is on the medication and Prior Auth will be required.      Please Advise

## 2021-05-11 NOTE — TELEPHONE ENCOUNTER
JONO  I provide patient with Dr. Carroll Holman note.     Think she should call her lung specialist to handle this ,as they are managing her lung medications and is better if letter comes from lung specialist !

## 2021-05-11 NOTE — TELEPHONE ENCOUNTER
Think she should call her lung specialist to handle this ,as they are managing her lung medications and is better if letter comes from lung specialist !

## 2021-05-17 ENCOUNTER — TELEPHONE (OUTPATIENT)
Dept: INTERNAL MEDICINE CLINIC | Age: 81
End: 2021-05-17

## 2021-05-17 NOTE — TELEPHONE ENCOUNTER
Pt said her BS last night was 251 before dinner and  this am was 75 before breakfast.   Wants to know if she should  take 2 tabs of Metformin since BS is 75. Call soon.

## 2021-05-24 ENCOUNTER — OFFICE VISIT (OUTPATIENT)
Dept: INTERNAL MEDICINE CLINIC | Age: 81
End: 2021-05-24
Payer: MEDICARE

## 2021-05-24 VITALS
BODY MASS INDEX: 24.34 KG/M2 | HEART RATE: 106 BPM | RESPIRATION RATE: 16 BRPM | DIASTOLIC BLOOD PRESSURE: 80 MMHG | OXYGEN SATURATION: 95 % | WEIGHT: 137.4 LBS | SYSTOLIC BLOOD PRESSURE: 120 MMHG | HEIGHT: 63 IN

## 2021-05-24 DIAGNOSIS — J44.9 COPD, SEVERE (HCC): ICD-10-CM

## 2021-05-24 DIAGNOSIS — J96.11 CHRONIC RESPIRATORY FAILURE WITH HYPOXIA (HCC): ICD-10-CM

## 2021-05-24 DIAGNOSIS — E11.9 TYPE 2 DIABETES MELLITUS WITHOUT COMPLICATION, WITHOUT LONG-TERM CURRENT USE OF INSULIN (HCC): Primary | ICD-10-CM

## 2021-05-24 DIAGNOSIS — R00.0 TACHYCARDIA: ICD-10-CM

## 2021-05-24 LAB — HBA1C MFR BLD: 7.1 %

## 2021-05-24 PROCEDURE — 1036F TOBACCO NON-USER: CPT | Performed by: INTERNAL MEDICINE

## 2021-05-24 PROCEDURE — 3051F HG A1C>EQUAL 7.0%<8.0%: CPT | Performed by: INTERNAL MEDICINE

## 2021-05-24 PROCEDURE — 3023F SPIROM DOC REV: CPT | Performed by: INTERNAL MEDICINE

## 2021-05-24 PROCEDURE — 99214 OFFICE O/P EST MOD 30 MIN: CPT | Performed by: INTERNAL MEDICINE

## 2021-05-24 PROCEDURE — 4040F PNEUMOC VAC/ADMIN/RCVD: CPT | Performed by: INTERNAL MEDICINE

## 2021-05-24 PROCEDURE — G8427 DOCREV CUR MEDS BY ELIG CLIN: HCPCS | Performed by: INTERNAL MEDICINE

## 2021-05-24 PROCEDURE — G8420 CALC BMI NORM PARAMETERS: HCPCS | Performed by: INTERNAL MEDICINE

## 2021-05-24 PROCEDURE — 1123F ACP DISCUSS/DSCN MKR DOCD: CPT | Performed by: INTERNAL MEDICINE

## 2021-05-24 PROCEDURE — 83036 HEMOGLOBIN GLYCOSYLATED A1C: CPT | Performed by: INTERNAL MEDICINE

## 2021-05-24 PROCEDURE — G8399 PT W/DXA RESULTS DOCUMENT: HCPCS | Performed by: INTERNAL MEDICINE

## 2021-05-24 PROCEDURE — G8926 SPIRO NO PERF OR DOC: HCPCS | Performed by: INTERNAL MEDICINE

## 2021-05-24 PROCEDURE — 1090F PRES/ABSN URINE INCON ASSESS: CPT | Performed by: INTERNAL MEDICINE

## 2021-05-24 ASSESSMENT — ENCOUNTER SYMPTOMS
ABDOMINAL PAIN: 0
NAUSEA: 0
TROUBLE SWALLOWING: 0
VOMITING: 0

## 2021-05-24 ASSESSMENT — PATIENT HEALTH QUESTIONNAIRE - PHQ9
2. FEELING DOWN, DEPRESSED OR HOPELESS: 0
1. LITTLE INTEREST OR PLEASURE IN DOING THINGS: 0
SUM OF ALL RESPONSES TO PHQ QUESTIONS 1-9: 0
SUM OF ALL RESPONSES TO PHQ9 QUESTIONS 1 & 2: 0

## 2021-05-24 ASSESSMENT — SOCIAL DETERMINANTS OF HEALTH (SDOH): HOW HARD IS IT FOR YOU TO PAY FOR THE VERY BASICS LIKE FOOD, HOUSING, MEDICAL CARE, AND HEATING?: NOT HARD AT ALL

## 2021-05-24 NOTE — PROGRESS NOTES
500-50 MCG/DOSE diskus inhaler INHALE 1 PUFF TWICE DAILY 3 Inhaler 1    albuterol sulfate  (90 Base) MCG/ACT inhaler Inhale 2 puffs into the lungs every 4 hours as needed for Wheezing or Shortness of Breath 1 Inhaler 5    theophylline (RAFA-24) 300 MG extended release capsule Take 1 capsule by mouth daily 90 capsule 2    montelukast (SINGULAIR) 10 MG tablet TAKE 1 TABLET NIGHTLY 90 tablet 3    Cyanocobalamin (VITAMIN B 12 PO) Take 1 tablet by mouth daily      Glucos-MSM-C-Nj-Ovjerx-Wepmia (GLUCOSAMINE MSM COMPLEX PO) Take 1 tablet by mouth 2 times daily      aspirin 81 MG tablet Take 81 mg by mouth 3 times a week      Calcium Carb-Cholecalciferol 600-500 MG-UNIT CAPS Take 1 tablet by mouth 2 times daily 1 capsule 0    albuterol (PROVENTIL) (2.5 MG/3ML) 0.083% nebulizer solution Take 3 mLs by nebulization 2 times daily 180 mL 11    saline nasal gel (AYR) GEL APPLY INSIDE BOTH NOSTRILS TWO TIMES A DAY 1 Tube 2    Misc. Devices (ACAPELLA) MISC 1 Device by Does not apply route daily 1 each 0    sodium chloride 3 % nebulizer solution Take 15 mLs by nebulization as needed for Other or Cough 300 mL 1    levothyroxine (SYNTHROID) 100 MCG tablet TAKE 1 TABLET EVERY DAY 90 tablet 1     No current facility-administered medications for this visit. No results for input(s): WBC, HGB, HCT, MCV, PLT in the last 720 hours.      Lab Results   Component Value Date     02/27/2020    K 4.3 02/27/2020     02/27/2020    CO2 24 02/27/2020    BUN 17 02/27/2020    CREATININE 0.5 02/27/2020    GLUCOSE 167 02/27/2020    CALCIUM 9.5 02/27/2020        Lab Results   Component Value Date    CHOL 188 04/26/2019    CHOL 139 12/08/2017    CHOL 146 11/15/2016     Lab Results   Component Value Date    TRIG 402 (H) 04/26/2019    TRIG 175 (H) 12/08/2017    TRIG 125 11/15/2016     Lab Results   Component Value Date    HDL 78 (H) 04/26/2019    HDL 71 (H) 12/08/2017    HDL 72 (H) 11/15/2016     Lab Results   Component Value Date    LDLCALC see below 04/26/2019    [facility] 33 12/08/2017    LDLCALC 49 11/15/2016     Lab Results   Component Value Date    LABVLDL see below 04/26/2019    LABVLDL 35 12/08/2017    LABVLDL 25 11/15/2016     No results found for: CHOLHDLRATIO     Objective:   Physical Exam  Vitals and nursing note reviewed. Constitutional:       General: She is not in acute distress. Eyes:      General: No scleral icterus. Extraocular Movements: Extraocular movements intact. Conjunctiva/sclera: Conjunctivae normal.      Pupils: Pupils are equal, round, and reactive to light. Neck:      Thyroid: No thyromegaly. Vascular: No carotid bruit or JVD. Cardiovascular:      Rate and Rhythm: Tachycardia present. Heart sounds: Normal heart sounds. No murmur heard. No gallop. Pulmonary:      Effort: No respiratory distress. Breath sounds: Stridor: has decreased bs both lungs. No wheezing, rhonchi or rales. Musculoskeletal:      Right lower leg: Edema (has 1+ ankle edema both ankles) present. Left lower leg: Edema present. Lymphadenopathy:      Cervical: No cervical adenopathy. Neurological:      Mental Status: She is alert and oriented to person, place, and time.          Assessment:      Copd stable  Chronic respiratory failure stable and is on oxygen     Diabetes stable and A1C is 7.1 today-due to f.h and steroids   Tachycardia  Multifactorial -may be from her respiratory failure and or theophylline   Plan:    Discussed with her   Continue current medications  See in  3 months   Continue oxygen         Kylee Bhardwaj MD

## 2021-06-03 ENCOUNTER — TELEPHONE (OUTPATIENT)
Dept: PULMONOLOGY | Age: 81
End: 2021-06-03

## 2021-06-03 RX ORDER — PREDNISONE 20 MG/1
TABLET ORAL
Qty: 30 TABLET | Refills: 0 | Status: SHIPPED | OUTPATIENT
Start: 2021-06-03 | End: 2021-06-25 | Stop reason: SDUPTHER

## 2021-06-03 NOTE — TELEPHONE ENCOUNTER
Patient calling because she is getting really short of breath after taking just a few steps. She is weak from the sob. She is using her rescue inhaler a lot as well as her nebulizer. She is requesting something be sent in to help with the SOB. Please advise. [facility]acy: [facility] in [place].

## 2021-06-07 ENCOUNTER — TELEPHONE (OUTPATIENT)
Dept: PULMONOLOGY | Age: 81
End: 2021-06-07

## 2021-06-07 ENCOUNTER — OFFICE VISIT (OUTPATIENT)
Dept: PULMONOLOGY | Age: 81
End: 2021-06-07
Payer: MEDICARE

## 2021-06-07 VITALS
WEIGHT: 136 LBS | RESPIRATION RATE: 16 BRPM | TEMPERATURE: 96.6 F | HEIGHT: 63 IN | SYSTOLIC BLOOD PRESSURE: 140 MMHG | OXYGEN SATURATION: 89 % | HEART RATE: 121 BPM | DIASTOLIC BLOOD PRESSURE: 66 MMHG | BODY MASS INDEX: 24.1 KG/M2

## 2021-06-07 DIAGNOSIS — J43.2 CENTRILOBULAR EMPHYSEMA (HCC): Primary | ICD-10-CM

## 2021-06-07 DIAGNOSIS — J96.11 CHRONIC RESPIRATORY FAILURE WITH HYPOXIA (HCC): ICD-10-CM

## 2021-06-07 PROCEDURE — G8926 SPIRO NO PERF OR DOC: HCPCS | Performed by: INTERNAL MEDICINE

## 2021-06-07 PROCEDURE — G8427 DOCREV CUR MEDS BY ELIG CLIN: HCPCS | Performed by: INTERNAL MEDICINE

## 2021-06-07 PROCEDURE — 1036F TOBACCO NON-USER: CPT | Performed by: INTERNAL MEDICINE

## 2021-06-07 PROCEDURE — 3023F SPIROM DOC REV: CPT | Performed by: INTERNAL MEDICINE

## 2021-06-07 PROCEDURE — G8420 CALC BMI NORM PARAMETERS: HCPCS | Performed by: INTERNAL MEDICINE

## 2021-06-07 PROCEDURE — 99214 OFFICE O/P EST MOD 30 MIN: CPT | Performed by: INTERNAL MEDICINE

## 2021-06-07 PROCEDURE — 4040F PNEUMOC VAC/ADMIN/RCVD: CPT | Performed by: INTERNAL MEDICINE

## 2021-06-07 PROCEDURE — 1123F ACP DISCUSS/DSCN MKR DOCD: CPT | Performed by: INTERNAL MEDICINE

## 2021-06-07 PROCEDURE — 1090F PRES/ABSN URINE INCON ASSESS: CPT | Performed by: INTERNAL MEDICINE

## 2021-06-07 PROCEDURE — G8399 PT W/DXA RESULTS DOCUMENT: HCPCS | Performed by: INTERNAL MEDICINE

## 2021-06-07 RX ORDER — FLUTICASONE FUROATE, UMECLIDINIUM BROMIDE AND VILANTEROL TRIFENATATE 200; 62.5; 25 UG/1; UG/1; UG/1
1 POWDER RESPIRATORY (INHALATION) DAILY
Qty: 1 EACH | Refills: 0 | COMMUNITY
Start: 2021-06-07 | End: 2021-08-24 | Stop reason: SDUPTHER

## 2021-06-07 RX ORDER — THEOPHYLLINE 400 MG/1
400 TABLET, EXTENDED RELEASE ORAL DAILY
Qty: 30 TABLET | Refills: 3 | Status: SHIPPED | OUTPATIENT
Start: 2021-06-07 | End: 2021-06-25 | Stop reason: SDUPTHER

## 2021-06-07 NOTE — TELEPHONE ENCOUNTER
Dr. Teto Mcintosh asked the patient to call in with med name ,  Hernan-24-Er 200 mg caps.  Take one capsule every day

## 2021-06-07 NOTE — PATIENT INSTRUCTIONS
Please take theophylline 400mg daily and obtain blood test in 2 weeks  Please use Trelegy instead of Wixela and Spiriva.  If it helps please let me know so I can send a prescription    Please come for a follow up in 2 months

## 2021-06-07 NOTE — PROGRESS NOTES
Chief complaint  This is a 79y.o. year old female  who presents with a chief complaint of   Chief Complaint   Patient presents with    COPD       HPI  18-year-old woman with moderate COPD (FEV1 1.36 L, 61% predicted), chronic hypoxic respiratory failure and Mycobacterium avium complex infection (completed 18 months of therapy) presents for follow-up. She continues to be troubled by limiting shortness of breath. Last week her shortness of breath increased. She was prescribed a prednisone taper. She completed 60 mg for 3 days. Tomorrow she starts the 40 mg dose. She has an intermittent cough. She is compliant with Wixela and Spiriva. She uses albuterol inhaler several times a day which helps her breathing. She uses DuoNebs 4 times a day. She uses 3% saline nebulizers as needed. She is on prednisone 15 mg daily chronically and theophylline 300 mg daily. She uses 5 L of oxygen with her portable concentrator and 9 L when she is at home. She completed pulmonary rehabilitation. Past Medical History:   Diagnosis Date    COPD (chronic obstructive pulmonary disease) ([address]     GERD (gastroesophageal reflux disease)     High cholesterol     Osteoporosis        Past Surgical History:   Procedure Laterality Date     SECTION      4 times    COLONOSCOPY  . redundant colon       Current Outpatient Medications   Medication Sig Dispense Refill    Fluticasone-Umeclidin-Vilant (TRELEGY ELLIPTA) 200-62.5-25 MCG/INH AEPB Inhale 1 puff into the lungs daily 1 each 0    predniSONE (DELTASONE) 20 MG tablet Take 60 mg daily for 5 days, then 40 mg daily for 5 days, then 20 mg daily for 5 days 30 tablet 0    metFORMIN (GLUCOPHAGE-XR) 500 MG extended release tablet Take 2 tablets by mouth daily (with breakfast) 180 tablet 1    blood glucose monitor strips Test 1  time a day & as needed for symptoms of irregular blood glucose.  100 strip 3    Lancets MISC 1 each by Does not apply route daily 100 each 3    ipratropium-albuterol (DUONEB) 0.5-2.5 (3) MG/3ML SOLN nebulizer solution Inhale 3 mLs into the lungs every 4 hours 540 mL 3    predniSONE (DELTASONE) 10 MG tablet Take 1.5 tablets by mouth daily (Patient taking differently: Take 7 mg by mouth daily ) 135 tablet 2    atorvastatin (LIPITOR) 20 MG tablet TAKE 1 TABLET EVERY DAY 90 tablet 1    predniSONE (DELTASONE) 10 MG tablet Take 1 tablet by mouth daily 90 tablet 3    omeprazole (PRILOSEC) 40 MG delayed release capsule TAKE 1 CAPSULE EVERY DAY 90 capsule 1    SPIRIVA HANDIHALER 18 MCG inhalation capsule INHALE THE CONTENTS OF 1 CAPSULE DAILY 90 capsule 1    predniSONE (DELTASONE) 20 MG tablet Take 60 mg for 3 days, then 40 mg for 3 days, then 20 mg for 3 days, 18 tablet 1    fluticasone-salmeterol (WIXELA INHUB) 500-50 MCG/DOSE diskus inhaler INHALE 1 PUFF TWICE DAILY 3 Inhaler 1    albuterol sulfate  (90 Base) MCG/ACT inhaler Inhale 2 puffs into the lungs every 4 hours as needed for Wheezing or Shortness of Breath 1 Inhaler 5    theophylline (RAFA-24) 300 MG extended release capsule Take 1 capsule by mouth daily 90 capsule 2    montelukast (SINGULAIR) 10 MG tablet TAKE 1 TABLET NIGHTLY 90 tablet 3    Cyanocobalamin (VITAMIN B 12 PO) Take 1 tablet by mouth daily      Glucos-MSM-C-Eq-Xfrscc-Jumgvj (GLUCOSAMINE MSM COMPLEX PO) Take 1 tablet by mouth 2 times daily      aspirin 81 MG tablet Take 81 mg by mouth 3 times a week      Calcium Carb-Cholecalciferol 600-500 MG-UNIT CAPS Take 1 tablet by mouth 2 times daily 1 capsule 0    albuterol (PROVENTIL) (2.5 MG/3ML) 0.083% nebulizer solution Take 3 mLs by nebulization 2 times daily 180 mL 11    saline nasal gel (AYR) GEL APPLY INSIDE BOTH NOSTRILS TWO TIMES A DAY 1 Tube 2    Misc.  Devices (ACAPELLA) MISC 1 Device by Does not apply route daily 1 each 0    sodium chloride 3 % nebulizer solution Take 15 mLs by nebulization as needed for Other or Cough 300 mL 1    levothyroxine (SYNTHROID) 100 MCG tablet TAKE 1 TABLET EVERY DAY 90 tablet 1     No current facility-administered medications for this visit. Allergies   Allergen Reactions    Sulfa Antibiotics        Family History   Problem Relation Age of Onset    Diabetes Sister         x's 3    Other Sister         cad    High Blood Pressure Mother     Diabetes Mother     Heart Attack Mother     Other Mother         hypothyroidism    High Blood Pressure Father     Heart Attack Father     Heart Attack Maternal Aunt        Social History     Socioeconomic History    Marital status:      Spouse name: Not on file    Number of children: Not on file    Years of education: Not on file    Highest education level: Not on file   Occupational History    Not on file   Tobacco Use    Smoking status: Former Smoker     Packs/day: 1.50     Years: 50.00     Pack years: 75.00     Types: Cigarettes     Start date: 1951     Quit date: 1999     Years since quittin.7    Smokeless tobacco: Never Used    Tobacco comment: quit 10 years ago   Vaping Use    Vaping Use: Never used   Substance and Sexual Activity    Alcohol use: No     Alcohol/week: 0.0 standard drinks    Drug use: No    Sexual activity: Not on file   Other Topics Concern    Not on file   Social History Narrative    Not on file     Social Determinants of Health     Financial Resource Strain: Low Risk     Difficulty of Paying Living Expenses: Not hard at all   Food Insecurity: No Food Insecurity    Worried About [address] in the Last Year: Never true    [facility] in the Last Year: Never true   Transportation Needs:     Lack of Transportation (Medical):      Lack of Transportation (Non-Medical):    Physical Activity:     Days of Exercise per Week:     Minutes of Exercise per Session:    Stress:     Feeling of Stress :    Social Connections:     Frequency of Communication with Friends and Family:     Frequency of Social Gatherings with Friends and Family:     Attends Gnosticist Services:     Active Member of Clubs or Organizations:     Attends Club or Organization Meetings:     Marital Status:    Intimate Partner Violence:     Fear of Current or Ex-Partner:     Emotionally Abused:     Physically Abused:     Sexually Abused:        Immunization History   Administered Date(s) Administered    COVID-19, [employer], PF, 30mcg/0.3mL 01/20/2021, 02/10/2021    Influenza Nasal 11/01/2014    Influenza Vaccine, unspecified formulation 10/29/2014, 10/12/2015, 11/15/2016    Influenza Virus Vaccine 10/01/2008, 11/01/2014    Influenza Whole 10/01/2008    Influenza, High Dose (Fluzone 65 yrs and older) 10/01/2008, 10/12/2015, 11/15/2016, 10/19/2018, 10/28/2019    Influenza, Richrd Pierini, Recombinant, IM PF (Flublok 18 yrs and older) 10/28/2019    Influenza, Quadv, adjuvanted, 65 yrs +, IM, PF (Fluad) 09/15/2020    Pneumococcal Conjugate 13-valent ([id number]) 04/15/2016    Pneumococcal Conjugate 7-valent (Prevnar7) 11/01/2005, 10/11/2010    Pneumococcal Polysaccharide ([id number]) 11/01/2005, 03/10/2017    Td (Adult), 2 Lf Tetanus Toxoid, Pf (Td, Absorbed) 08/05/2020       ROS:  GENERAL:  No fevers, no chills, +2lb weight loss in 2 months   RESPIRATORY:  +exertional shortness of breath, no wheezing       PHYSICAL EXAM:  Vitals:    06/07/21 1453   BP: 140/66   Site: Right Upper Arm   Position: Sitting   Cuff Size: Medium Adult   Pulse: 121   Resp: 16   Temp: 96.6 °F (35.9 °C)   TempSrc: Temporal   SpO2: (!) 89%   Weight: 136 lb (61.7 kg)   Height: 5' 3\" (1.6 m)   on 6L NC    Gen: Well developed; well nourished  Eyes: No scleral icterus. No conjunctival injection. ENT:  Oropharynx clear. External appearance of ears and nose normal.  Neck: Trachea midline. No obvious mass.  No visible thyroid enlargement    Respiratory: Clear to auscultation bilaterally, no accessory muscle use  Cardiovascular: Regular rate and rhythm, no

## 2021-06-08 NOTE — TELEPHONE ENCOUNTER
Called Kathy and gave her Dr berman's instructions and the need to get her blood drawn 2 weeks after starting the Theophylline 400

## 2021-06-14 RX ORDER — FLUTICASONE FUROATE, UMECLIDINIUM BROMIDE AND VILANTEROL TRIFENATATE 200; 62.5; 25 UG/1; UG/1; UG/1
1 POWDER RESPIRATORY (INHALATION) DAILY
Qty: 3 EACH | Refills: 3 | COMMUNITY
Start: 2021-06-14 | End: 2021-06-28 | Stop reason: SDUPTHER

## 2021-06-14 NOTE — TELEPHONE ENCOUNTER
Chintan Thomas called and said that she trialed the Trelegy inhaler and it seems to be effective.  She would like Dr Larry Markham to send a Rx for this to [facility] mail order for 90 day supply

## 2021-06-21 DIAGNOSIS — J43.2 CENTRILOBULAR EMPHYSEMA (HCC): ICD-10-CM

## 2021-06-22 LAB — THEOPHYLLINE LEVEL: 6.9 UG/ML (ref 8–20)

## 2021-06-25 ENCOUNTER — TELEPHONE (OUTPATIENT)
Dept: PULMONOLOGY | Age: 81
End: 2021-06-25

## 2021-06-25 DIAGNOSIS — R05.9 COUGH: Primary | ICD-10-CM

## 2021-06-25 RX ORDER — PREDNISONE 20 MG/1
TABLET ORAL
Qty: 30 TABLET | Refills: 0 | Status: SHIPPED | OUTPATIENT
Start: 2021-06-25 | End: 2021-08-26 | Stop reason: ALTCHOICE

## 2021-06-25 RX ORDER — THEOPHYLLINE 400 MG/1
400 TABLET, EXTENDED RELEASE ORAL DAILY
Qty: 90 TABLET | Refills: 3 | Status: SHIPPED | OUTPATIENT
Start: 2021-06-25

## 2021-06-25 RX ORDER — AZITHROMYCIN 250 MG/1
250 TABLET, FILM COATED ORAL SEE ADMIN INSTRUCTIONS
Qty: 6 TABLET | Refills: 0 | Status: SHIPPED | OUTPATIENT
Start: 2021-06-25 | End: 2021-06-30

## 2021-06-25 NOTE — TELEPHONE ENCOUNTER
Patient verbalized understanding . She would like to see if you could send in an Rx for it through [facility]?

## 2021-06-25 NOTE — TELEPHONE ENCOUNTER
Patient would like to know the results of her theophylline labs and said if she needs an Rx, it needs to be sent to [facility] mail delivery pharmacy.

## 2021-06-28 RX ORDER — FLUTICASONE FUROATE, UMECLIDINIUM BROMIDE AND VILANTEROL TRIFENATATE 200; 62.5; 25 UG/1; UG/1; UG/1
1 POWDER RESPIRATORY (INHALATION) DAILY
Qty: 3 EACH | Refills: 11 | Status: SHIPPED | OUTPATIENT
Start: 2021-06-28

## 2021-06-28 NOTE — TELEPHONE ENCOUNTER
Patient calling back because she was told by [facility] that they never received the Rx for Trelegy. This needs to be resent.  She is out of her medication     Patient requesting a call back

## 2021-07-06 DIAGNOSIS — J43.2 CENTRILOBULAR EMPHYSEMA (HCC): ICD-10-CM

## 2021-07-06 RX ORDER — FLUTICASONE FUROATE, UMECLIDINIUM BROMIDE AND VILANTEROL TRIFENATATE 200; 62.5; 25 UG/1; UG/1; UG/1
1 POWDER RESPIRATORY (INHALATION) DAILY
Qty: 3 EACH | Refills: 0 | OUTPATIENT
Start: 2021-07-06

## 2021-07-19 ENCOUNTER — TELEPHONE (OUTPATIENT)
Dept: PULMONOLOGY | Age: 81
End: 2021-07-19

## 2021-07-19 RX ORDER — DOXYCYCLINE HYCLATE 100 MG
100 TABLET ORAL 2 TIMES DAILY
Qty: 14 TABLET | Refills: 0 | Status: SHIPPED | OUTPATIENT
Start: 2021-07-19 | End: 2021-07-26

## 2021-07-19 RX ORDER — PREDNISONE 20 MG/1
TABLET ORAL
Qty: 10 TABLET | Refills: 0 | Status: SHIPPED | OUTPATIENT
Start: 2021-07-19 | End: 2021-08-26 | Stop reason: ALTCHOICE

## 2021-07-19 NOTE — TELEPHONE ENCOUNTER
Former [facility] patient scheduled with Veneda Blend  8/26     Patient has finished antibiotics and Prednisone taper and is struggling to breathe and is very SOB with or without exertion . Pulse ox is 83-86. Please advise.

## 2021-07-21 RX ORDER — LEVOTHYROXINE SODIUM 0.1 MG/1
TABLET ORAL
Qty: 90 TABLET | Refills: 1 | Status: SHIPPED | OUTPATIENT
Start: 2021-07-21 | End: 2021-10-19

## 2021-07-21 RX ORDER — OMEPRAZOLE 40 MG/1
CAPSULE, DELAYED RELEASE ORAL
Qty: 90 CAPSULE | Refills: 1 | Status: SHIPPED | OUTPATIENT
Start: 2021-07-21

## 2021-07-21 RX ORDER — MONTELUKAST SODIUM 10 MG/1
TABLET ORAL
Qty: 90 TABLET | Refills: 1 | Status: SHIPPED | OUTPATIENT
Start: 2021-07-21

## 2021-07-21 NOTE — TELEPHONE ENCOUNTER
LOV: 05/24/21    Future Appointments   Date Time Provider Omar Fiorella   8/24/2021 12:45 PM Elder Patel MD [address]   8/26/2021  1:40 PM Oumar Morelos MD [facility]

## 2021-07-26 ENCOUNTER — TELEPHONE (OUTPATIENT)
Dept: INTERNAL MEDICINE CLINIC | Age: 81
End: 2021-07-26

## 2021-07-26 RX ORDER — FUROSEMIDE 20 MG/1
20 TABLET ORAL DAILY
Qty: 10 TABLET | Refills: 0 | Status: SHIPPED | OUTPATIENT
Start: 2021-07-26 | End: 2021-09-26

## 2021-07-26 NOTE — TELEPHONE ENCOUNTER
Elevate bot feet up as much as possible  If she has help,can apply ace bandage from foot to below knee to compress her skin and not allow fluid to accumulate   Elevate foot    End of bed by 10 inches all the time-place 2 pillows between mattress and box spring  Lasix 20 mg daily x 10-script sent to her pharmacy    See me if not getting better  Make sure she has no dyspnea at rest and when sleeping

## 2021-07-26 NOTE — TELEPHONE ENCOUNTER
Pt has swelling in both feet. She said her dog stepped on left foot and broke the skin and is leaking fluid. She has also had chills a couple of times since then. Please advise.

## 2021-08-04 ENCOUNTER — TELEPHONE (OUTPATIENT)
Dept: INTERNAL MEDICINE CLINIC | Age: 81
End: 2021-08-04

## 2021-08-24 ENCOUNTER — OFFICE VISIT (OUTPATIENT)
Dept: INTERNAL MEDICINE CLINIC | Age: 81
End: 2021-08-24
Payer: MEDICARE

## 2021-08-24 VITALS
BODY MASS INDEX: 23.11 KG/M2 | HEART RATE: 114 BPM | HEIGHT: 63 IN | SYSTOLIC BLOOD PRESSURE: 130 MMHG | RESPIRATION RATE: 16 BRPM | WEIGHT: 130.4 LBS | OXYGEN SATURATION: 88 % | TEMPERATURE: 96.8 F | DIASTOLIC BLOOD PRESSURE: 70 MMHG

## 2021-08-24 DIAGNOSIS — K21.9 GASTROESOPHAGEAL REFLUX DISEASE WITHOUT ESOPHAGITIS: ICD-10-CM

## 2021-08-24 DIAGNOSIS — J96.11 CHRONIC RESPIRATORY FAILURE WITH HYPOXIA (HCC): Primary | ICD-10-CM

## 2021-08-24 DIAGNOSIS — E03.9 HYPOTHYROIDISM, UNSPECIFIED TYPE: ICD-10-CM

## 2021-08-24 DIAGNOSIS — E78.00 PURE HYPERCHOLESTEROLEMIA: ICD-10-CM

## 2021-08-24 DIAGNOSIS — J44.9 COPD, SEVERE (HCC): ICD-10-CM

## 2021-08-24 DIAGNOSIS — E11.9 TYPE 2 DIABETES MELLITUS WITHOUT COMPLICATION, WITHOUT LONG-TERM CURRENT USE OF INSULIN (HCC): ICD-10-CM

## 2021-08-24 LAB
ALBUMIN SERPL-MCNC: 4.4 G/DL (ref 3.4–5)
ALP BLD-CCNC: 47 U/L (ref 40–129)
ALT SERPL-CCNC: 13 U/L (ref 10–40)
ANION GAP SERPL CALCULATED.3IONS-SCNC: 17 MMOL/L (ref 3–16)
AST SERPL-CCNC: 14 U/L (ref 15–37)
BASOPHILS ABSOLUTE: 0.1 K/UL (ref 0–0.2)
BASOPHILS RELATIVE PERCENT: 0.8 %
BILIRUB SERPL-MCNC: 0.3 MG/DL (ref 0–1)
BILIRUBIN DIRECT: <0.2 MG/DL (ref 0–0.3)
BILIRUBIN, INDIRECT: ABNORMAL MG/DL (ref 0–1)
BUN BLDV-MCNC: 16 MG/DL (ref 7–20)
CALCIUM SERPL-MCNC: 10.7 MG/DL (ref 8.3–10.6)
CHLORIDE BLD-SCNC: 104 MMOL/L (ref 99–110)
CHOLESTEROL, TOTAL: 182 MG/DL (ref 0–199)
CO2: 22 MMOL/L (ref 21–32)
CREAT SERPL-MCNC: 0.8 MG/DL (ref 0.6–1.2)
EOSINOPHILS ABSOLUTE: 0 K/UL (ref 0–0.6)
EOSINOPHILS RELATIVE PERCENT: 0.2 %
GFR AFRICAN AMERICAN: >60
GFR NON-AFRICAN AMERICAN: >60
GLUCOSE BLD-MCNC: 141 MG/DL (ref 70–99)
HBA1C MFR BLD: 6.7 %
HCT VFR BLD CALC: 37.7 % (ref 36–48)
HDLC SERPL-MCNC: 106 MG/DL (ref 40–60)
HEMOGLOBIN: 11.9 G/DL (ref 12–16)
LDL CHOLESTEROL CALCULATED: 28 MG/DL
LYMPHOCYTES ABSOLUTE: 0.8 K/UL (ref 1–5.1)
LYMPHOCYTES RELATIVE PERCENT: 5.9 %
MCH RBC QN AUTO: 25.2 PG (ref 26–34)
MCHC RBC AUTO-ENTMCNC: 31.6 G/DL (ref 31–36)
MCV RBC AUTO: 79.8 FL (ref 80–100)
MONOCYTES ABSOLUTE: 0.3 K/UL (ref 0–1.3)
MONOCYTES RELATIVE PERCENT: 2.1 %
NEUTROPHILS ABSOLUTE: 12.2 K/UL (ref 1.7–7.7)
NEUTROPHILS RELATIVE PERCENT: 91 %
PDW BLD-RTO: 21.7 % (ref 12.4–15.4)
PLATELET # BLD: 351 K/UL (ref 135–450)
PMV BLD AUTO: 9.4 FL (ref 5–10.5)
POTASSIUM SERPL-SCNC: 5.3 MMOL/L (ref 3.5–5.1)
RBC # BLD: 4.72 M/UL (ref 4–5.2)
SODIUM BLD-SCNC: 143 MMOL/L (ref 136–145)
T4 FREE: 1.8 NG/DL (ref 0.9–1.8)
TOTAL PROTEIN: 6.7 G/DL (ref 6.4–8.2)
TRIGL SERPL-MCNC: 242 MG/DL (ref 0–150)
TSH SERPL DL<=0.05 MIU/L-ACNC: 0.48 UIU/ML (ref 0.27–4.2)
VLDLC SERPL CALC-MCNC: 48 MG/DL
WBC # BLD: 13.4 K/UL (ref 4–11)

## 2021-08-24 PROCEDURE — G8427 DOCREV CUR MEDS BY ELIG CLIN: HCPCS | Performed by: INTERNAL MEDICINE

## 2021-08-24 PROCEDURE — 83036 HEMOGLOBIN GLYCOSYLATED A1C: CPT | Performed by: INTERNAL MEDICINE

## 2021-08-24 PROCEDURE — 1090F PRES/ABSN URINE INCON ASSESS: CPT | Performed by: INTERNAL MEDICINE

## 2021-08-24 PROCEDURE — 3023F SPIROM DOC REV: CPT | Performed by: INTERNAL MEDICINE

## 2021-08-24 PROCEDURE — 4040F PNEUMOC VAC/ADMIN/RCVD: CPT | Performed by: INTERNAL MEDICINE

## 2021-08-24 PROCEDURE — G8420 CALC BMI NORM PARAMETERS: HCPCS | Performed by: INTERNAL MEDICINE

## 2021-08-24 PROCEDURE — G8926 SPIRO NO PERF OR DOC: HCPCS | Performed by: INTERNAL MEDICINE

## 2021-08-24 PROCEDURE — 3051F HG A1C>EQUAL 7.0%<8.0%: CPT | Performed by: INTERNAL MEDICINE

## 2021-08-24 PROCEDURE — 1123F ACP DISCUSS/DSCN MKR DOCD: CPT | Performed by: INTERNAL MEDICINE

## 2021-08-24 PROCEDURE — 99214 OFFICE O/P EST MOD 30 MIN: CPT | Performed by: INTERNAL MEDICINE

## 2021-08-24 PROCEDURE — 36415 COLL VENOUS BLD VENIPUNCTURE: CPT | Performed by: INTERNAL MEDICINE

## 2021-08-24 PROCEDURE — G8399 PT W/DXA RESULTS DOCUMENT: HCPCS | Performed by: INTERNAL MEDICINE

## 2021-08-24 PROCEDURE — 1036F TOBACCO NON-USER: CPT | Performed by: INTERNAL MEDICINE

## 2021-08-24 RX ORDER — INHALER, ASSIST DEVICES
1 SPACER (EA) MISCELLANEOUS EVERY 6 HOURS
Qty: 1 EACH | Refills: 0 | Status: SHIPPED | OUTPATIENT
Start: 2021-08-24

## 2021-08-24 ASSESSMENT — ENCOUNTER SYMPTOMS
SHORTNESS OF BREATH: 1
WHEEZING: 1
GASTROINTESTINAL NEGATIVE: 1
TROUBLE SWALLOWING: 0
COUGH: 1
CHEST TIGHTNESS: 0

## 2021-08-24 NOTE — PROGRESS NOTES
Subjective:      Patient ID: Richard Lopez is a 79 y.o. female. Chief Complaint   Patient presents with    COPD    Diabetes        HPI  She gets anxious and gets dyspnea and some times gets dyspnea and gets anxious  Uses Proventil inhaler every 2-3 hours -feels is not working  Has been coughing and no change and with sputum some times   Has no gi or gu symptoms but she is urinating a lot- often   Has no fever or chills  Review of Systems   Constitutional: Negative for chills and fever. HENT: Negative for trouble swallowing. Respiratory: Positive for cough, shortness of breath and wheezing. Negative for chest tightness. Cardiovascular: Negative for chest pain, palpitations and leg swelling. Gastrointestinal: Negative. Abdominal pain: has no heart burns    Genitourinary: Positive for frequency. Neurological: Negative for dizziness, light-headedness and headaches. Current Outpatient Medications   Medication Sig Dispense Refill    levothyroxine (SYNTHROID) 100 MCG tablet TAKE 1 TABLET EVERY DAY 90 tablet 1    omeprazole (PRILOSEC) 40 MG delayed release capsule TAKE 1 CAPSULE EVERY DAY 90 capsule 1    montelukast (SINGULAIR) 10 MG tablet TAKE 1 TABLET NIGHTLY 90 tablet 1    predniSONE (DELTASONE) 20 MG tablet 3 tablets daily x 5 days then 2 tablets daily x 5 days then 1 tablet daily x 5 days 10 tablet 0    Fluticasone-Umeclidin-Vilant (TRELEGY ELLIPTA) 200-62.5-25 MCG/INH AEPB Inhale 1 puff into the lungs daily 3 each 11    theophylline (UNIPHYL) 400 MG extended release tablet Take 1 tablet by mouth daily 90 tablet 3    predniSONE (DELTASONE) 20 MG tablet Take 60 mg daily for 5 days, then 40 mg daily for 5 days, then 20 mg daily for 5 days 30 tablet 0    Fluticasone-Umeclidin-Vilant (TRELEGY ELLIPTA) 200-62.5-25 MCG/INH AEPB Inhale 1 puff into the lungs daily 1 each 0    blood glucose monitor strips Test 1  time a day & as needed for symptoms of irregular blood glucose.  100 strip 3    Lancets MISC 1 each by Does not apply route daily 100 each 3    ipratropium-albuterol (DUONEB) 0.5-2.5 (3) MG/3ML SOLN nebulizer solution Inhale 3 mLs into the lungs every 4 hours 540 mL 3    predniSONE (DELTASONE) 10 MG tablet Take 1.5 tablets by mouth daily (Patient taking differently: Take 7 mg by mouth daily ) 135 tablet 2    atorvastatin (LIPITOR) 20 MG tablet TAKE 1 TABLET EVERY DAY 90 tablet 1    predniSONE (DELTASONE) 10 MG tablet Take 1 tablet by mouth daily 90 tablet 3    SPIRIVA HANDIHALER 18 MCG inhalation capsule INHALE THE CONTENTS OF 1 CAPSULE DAILY 90 capsule 1    predniSONE (DELTASONE) 20 MG tablet Take 60 mg for 3 days, then 40 mg for 3 days, then 20 mg for 3 days, 18 tablet 1    fluticasone-salmeterol (WIXELA INHUB) 500-50 MCG/DOSE diskus inhaler INHALE 1 PUFF TWICE DAILY 3 Inhaler 1    albuterol sulfate  (90 Base) MCG/ACT inhaler Inhale 2 puffs into the lungs every 4 hours as needed for Wheezing or Shortness of Breath 1 Inhaler 5    Cyanocobalamin (VITAMIN B 12 PO) Take 1 tablet by mouth daily      Glucos-MSM-C-Pd-Xpkmcs-Qysmql (GLUCOSAMINE MSM COMPLEX PO) Take 1 tablet by mouth 2 times daily      aspirin 81 MG tablet Take 81 mg by mouth 3 times a week      Calcium Carb-Cholecalciferol 600-500 MG-UNIT CAPS Take 1 tablet by mouth 2 times daily 1 capsule 0    albuterol (PROVENTIL) (2.5 MG/3ML) 0.083% nebulizer solution Take 3 mLs by nebulization 2 times daily 180 mL 11    saline nasal gel (AYR) GEL APPLY INSIDE BOTH NOSTRILS TWO TIMES A DAY 1 Tube 2    Misc.  Devices (ACAPELLA) MISC 1 Device by Does not apply route daily 1 each 0    sodium chloride 3 % nebulizer solution Take 15 mLs by nebulization as needed for Other or Cough 300 mL 1    furosemide (LASIX) 20 MG tablet Take 1 tablet by mouth daily for 10 days 10 tablet 0    metFORMIN (GLUCOPHAGE-XR) 500 MG extended release tablet Take 2 tablets by mouth daily (with breakfast) 180 tablet 1     No current facility-administered medications for this visit. No results for input(s): WBC, HGB, HCT, MCV, PLT in the last 720 hours. Lab Results   Component Value Date     02/27/2020    K 4.3 02/27/2020     02/27/2020    CO2 24 02/27/2020    BUN 17 02/27/2020    CREATININE 0.5 02/27/2020    GLUCOSE 167 02/27/2020    CALCIUM 9.5 02/27/2020        Lab Results   Component Value Date    CHOL 188 04/26/2019    CHOL 139 12/08/2017    CHOL 146 11/15/2016     Lab Results   Component Value Date    TRIG 402 (H) 04/26/2019    TRIG 175 (H) 12/08/2017    TRIG 125 11/15/2016     Lab Results   Component Value Date    HDL 78 (H) 04/26/2019    HDL 71 (H) 12/08/2017    HDL 72 (H) 11/15/2016     Lab Results   Component Value Date    LDLCALC see below 04/26/2019    LDLCALC 33 12/08/2017    LDLCALC 49 11/15/2016     Lab Results   Component Value Date    LABVLDL see below 04/26/2019    LABVLDL 35 12/08/2017    LABVLDL 25 11/15/2016     No results found for: CHOLHDLRATIO     Objective:   Physical Exam  Vitals and nursing note reviewed. Constitutional:       General: She is not in acute distress. Eyes:      General: No scleral icterus. Extraocular Movements: Extraocular movements intact. Conjunctiva/sclera: Conjunctivae normal.      Pupils: Pupils are equal, round, and reactive to light. Neck:      Thyroid: No thyromegaly. Vascular: No carotid bruit or JVD. Cardiovascular:      Rate and Rhythm: Regular rhythm. Tachycardia present. Pulses: Normal pulses. Heart sounds: Normal heart sounds. No murmur heard. No gallop. Pulmonary:      Effort: No respiratory distress. Breath sounds: Normal breath sounds. No wheezing, rhonchi or rales. Musculoskeletal:      Right lower leg: No edema. Left lower leg: No edema. Lymphadenopathy:      Cervical: No cervical adenopathy. Neurological:      Mental Status: She is alert and oriented to person, place, and time.          Assessment:      Copd

## 2021-08-26 ENCOUNTER — OFFICE VISIT (OUTPATIENT)
Dept: PULMONOLOGY | Age: 81
End: 2021-08-26
Payer: MEDICARE

## 2021-08-26 VITALS
DIASTOLIC BLOOD PRESSURE: 62 MMHG | TEMPERATURE: 96.2 F | HEIGHT: 63 IN | HEART RATE: 82 BPM | RESPIRATION RATE: 20 BRPM | SYSTOLIC BLOOD PRESSURE: 110 MMHG | BODY MASS INDEX: 23.32 KG/M2 | WEIGHT: 131.6 LBS | OXYGEN SATURATION: 91 %

## 2021-08-26 DIAGNOSIS — Z79.52 CURRENT CHRONIC USE OF SYSTEMIC STEROIDS: ICD-10-CM

## 2021-08-26 DIAGNOSIS — J96.11 CHRONIC RESPIRATORY FAILURE WITH HYPOXIA (HCC): ICD-10-CM

## 2021-08-26 DIAGNOSIS — J98.19 LUNG COLLAPSE: ICD-10-CM

## 2021-08-26 DIAGNOSIS — R06.02 SOB (SHORTNESS OF BREATH): Primary | ICD-10-CM

## 2021-08-26 DIAGNOSIS — Z66 DNR (DO NOT RESUSCITATE): ICD-10-CM

## 2021-08-26 DIAGNOSIS — J44.9 COPD, SEVERE (HCC): ICD-10-CM

## 2021-08-26 PROCEDURE — G8399 PT W/DXA RESULTS DOCUMENT: HCPCS | Performed by: INTERNAL MEDICINE

## 2021-08-26 PROCEDURE — G8427 DOCREV CUR MEDS BY ELIG CLIN: HCPCS | Performed by: INTERNAL MEDICINE

## 2021-08-26 PROCEDURE — G8926 SPIRO NO PERF OR DOC: HCPCS | Performed by: INTERNAL MEDICINE

## 2021-08-26 PROCEDURE — 4040F PNEUMOC VAC/ADMIN/RCVD: CPT | Performed by: INTERNAL MEDICINE

## 2021-08-26 PROCEDURE — 1123F ACP DISCUSS/DSCN MKR DOCD: CPT | Performed by: INTERNAL MEDICINE

## 2021-08-26 PROCEDURE — 99215 OFFICE O/P EST HI 40 MIN: CPT | Performed by: INTERNAL MEDICINE

## 2021-08-26 PROCEDURE — 1036F TOBACCO NON-USER: CPT | Performed by: INTERNAL MEDICINE

## 2021-08-26 PROCEDURE — 1090F PRES/ABSN URINE INCON ASSESS: CPT | Performed by: INTERNAL MEDICINE

## 2021-08-26 PROCEDURE — 3023F SPIROM DOC REV: CPT | Performed by: INTERNAL MEDICINE

## 2021-08-26 PROCEDURE — G8420 CALC BMI NORM PARAMETERS: HCPCS | Performed by: INTERNAL MEDICINE

## 2021-08-26 RX ORDER — BUDESONIDE, GLYCOPYRROLATE, AND FORMOTEROL FUMARATE 160; 9; 4.8 UG/1; UG/1; UG/1
2 AEROSOL, METERED RESPIRATORY (INHALATION) 2 TIMES DAILY
Qty: 1 INHALER | Refills: 0 | COMMUNITY
Start: 2021-08-26

## 2021-08-26 RX ORDER — PREDNISONE 20 MG/1
40 TABLET ORAL DAILY
Qty: 14 TABLET | Refills: 0 | Status: SHIPPED | OUTPATIENT
Start: 2021-08-26 | End: 2021-09-02

## 2021-08-26 NOTE — ASSESSMENT & PLAN NOTE
Andrey Juan Carloscandie continues to need and benefit from supplemental oxygen.  she is using oxygen continuously at 5-9 L NC.

## 2021-08-26 NOTE — ASSESSMENT & PLAN NOTE
She is currently using 15 mg daily. We discussed the lack of data for long term steroids in COPD. Further, I advised I would not increased more then 15 mg daily (chronically) secondary to risk of infection. This was discussed with the patient and dr. Tc Moise as well. I will give burst of prednisone as required.

## 2021-08-26 NOTE — PROGRESS NOTES
REASON FOR CONSULTATION/CC:   Chief Complaint   Patient presents with    COPD     f/u COPD          PCP: Sedrick Baez MD    HISTORY OF PRESENT ILLNESS: Mounika Mathews is a 79y.o. year old female with a history of  copd who presents :         COPD      Severe shortness of breath with exertion and limite dto < 5 feet. She is has been this bad for months. On pred burst and taper with some improvement (increased to 20-30 feet)  But still poor. Has coughand phlegm with green / yellow which is chronic.    3% saline as needed. Theophylline, Prednisone, Trelegy, Duoneb's         Chronic hypoxemic respiratory failure requiring 9 at baseline, currently on 5 L nc         REVIEW OF SYSTEMS:  Constitutional: Negative for fever    HENT: Negative for sore throat  Eyes: Negative for redness   Respiratory: + for dyspnea, cough  Cardiovascular: Negative for chest pain  Gastrointestinal: Negative for vomiting, diarrhea   Genitourinary: Negative for hematuria   Musculoskeletal: Negative for arthralgias   Skin: Negative for rash  Neurological: Negative for syncope  Hematological: Negative for adenopathy  Psychiatric/Behavorial: Negative for anxiety           Objective:   PHYSICAL EXAM:  Blood pressure 110/62, pulse 82, temperature 96.2 °F (35.7 °C), temperature source Infrared, resp. rate 20, height 5' 3\" (1.6 m), weight 131 lb 9.6 oz (59.7 kg), SpO2 91 %, not currently breastfeeding.'  Gen: No distress. Eyes: PERRL. No sclera icterus. No conjunctival injection. ENT: No discharge. Pharynx clear. External appearance of ears and nose normal.  Neck: Trachea midline. No obvious mass. Resp: No accessory muscle use. No crackles. No wheezes. No rhonchi. CV: Regular rate. Regular rhythm. No murmur or rub. No edema. GI:    Skin: Warm, dry, normal texture and turgor. No nodule on exposed extremities. Lymph: No cervical LAD. No supraclavicular LAD. M/S: No cyanosis. No clubbing. No joint deformity.     Neuro: Abdomen: The gallbladder surgically absent. Mild nodularity of the  left adrenal gland is stable. Fatty infiltration of liver suspected. Soft Tissues/Bones: No acute bony abnormality. Impression  Interval resolution of right lung airspace disease which presumably was  inflammatory. CT Chest w/o contrast: Results for orders placed during the hospital encounter of 02/27/20    CT CHEST WO CONTRAST    Narrative  EXAMINATION:  CT OF THE CHEST WITHOUT CONTRAST 2/27/2020 5:00 pm    TECHNIQUE:  CT of the chest was performed without the administration of intravenous  contrast. Multiplanar reformatted images are provided for review. Dose  modulation, iterative reconstruction, and/or weight based adjustment of the  mA/kV was utilized to reduce the radiation dose to as low as reasonably  achievable. COMPARISON:  Chest x-ray 02/27/2020, low-dose chest CT 07/21/2018    HISTORY:  ORDERING SYSTEM PROVIDED HISTORY: further evaluate CXR findings. TECHNOLOGIST PROVIDED HISTORY:  Reason for exam:->further evaluate CXR findings. Reason for Exam: cough f/u cxr  Acuity: Chronic  Type of Exam: Subsequent/Follow-up    FINDINGS:  Mediastinum: Heart borderline size. Small amount of pericardial fluid. Aortic vascular calcifications. Coronary calcifications. Scattered  mediastinal lymph nodes maintain a normal reniform shape with fatty hilum. No suspicious mediastinal, hilar, or axillary adenopathy identified per CT  criteria. Lungs/pleura: Chronic emphysematous change. Subpleural interstitial  thickening identified throughout the lungs. Focal parenchymal past  identified involving right middle lobe and lingula with bronchiectasis  interstitial thickening but this also involves the right lower lobe and left  lower lobe dependently. Involving the left upper lobe anteriorly. Findings  are suggestive of multifocal atypical pneumonia. Upper Abdomen: Upper abdominal structures grossly unremarkable.     Soft Tissues/Bones: Multilevel degenerate change. Impression  Multifocal hazy opacities with bronchiectasis lingula and right middle lobe. Findings may be due to underlying atypical pneumonia/mycobacterium avium  infection. Please correlate exam findings and history. Follow-up following medical treatment course in 1 in 3 months recommended  document complete resolution. CTPA: No results found for this or any previous visit. CXR PA/LAT: Results for orders placed during the hospital encounter of 07/20/20    XR CHEST STANDARD (2 VW)    Narrative  EXAMINATION:  TWO XRAY VIEWS OF THE CHEST    7/20/2020 3:32 pm    COMPARISON:  02/27/2020    HISTORY:  ORDERING SYSTEM PROVIDED HISTORY: Multifocal pneumonia  TECHNOLOGIST PROVIDED HISTORY:  Reason for exam:->Follow-up pneumonia  Reason for Exam: increasing sob for 2 months. hx pneumonia feb 2020 hx copd  Acuity: Chronic  Type of Exam: Initial    FINDINGS:  Heart size is stable. Mediastinal contours are stable. Pulmonary  vascularity is stable. There is improved aeration lungs bilaterally, with minimal opacity remaining. There is underlying emphysema. Impression  Improved aeration of lungs bilaterally. A few subtle opacities remain. There is underlying emphysema      CXR portable: Results for orders placed during the hospital encounter of 02/27/20    XR CHEST PORTABLE    Narrative  EXAMINATION:  ONE XRAY VIEW OF THE CHEST    2/27/2020 12:50 pm    COMPARISON:  10/08/2018    HISTORY:  ORDERING SYSTEM PROVIDED HISTORY: Shortness of breath  TECHNOLOGIST PROVIDED HISTORY:  Reason for exam:->Shortness of breath  Reason for Exam: very sob  Acuity: Acute  Type of Exam: Initial    FINDINGS:  Pulmonary vasculature is congested. Patchy bilateral lower lung airspace  disease is present, greater on the right. No pneumothorax is found. No free  air. No acute bony abnormality.     Impression  Patchy bibasilar airspace disease, greater on the right, along with mild  pulmonary vascular congestion. Early pulmonary edema would be primarily  considered, but multifocal pneumonia also remains in the differential.  The  findings should be followed to resolution. Total labs reviewed 3+    Category 2 Data points:    Radiology Review:  Independent interpretation of      Category 3 Data points:    Discussed management or interpretation of test with external provider:              Assessment:     COPD, FEV 54%, DLCO 19% hx Wixela, Spiriva , Trelegy, Breztri   Chronic hypoxemic respiratory failure requiring 5-9 at baseline  Hx of HEAVENLY status post 18 months of treatment   DNR - CCA discussed 8/26/21          Plan:      Problem List Items Addressed This Visit     DNR (do not resuscitate)     This was last addressed in 2016. With progression of disease, requiring 5-9 L NC, severe emphysema and age, she would not like chest compression or intubation. DNR - CCA          Current chronic use of systemic steroids     She is currently using 15 mg daily. We discussed the lack of data for long term steroids in COPD. Further, I advised I would not increased more then 15 mg daily (chronically) secondary to risk of infection. This was discussed with the patient and dr. Elke Ramirez as well. I will give burst of prednisone as required. COPD, severe ([address]     While her FEV1 is 54%, her DLCO is 19%. This is secondary to severe emphysema. No sign of pulmonary hypertension based on echo 2020. She is currently on Trelegy, Duoneb's , theophylline, and chronic prednisone at 15 mg daily with oxygen at 5-9L NC. She is having worsening dyspnea. We reviewed pulmonary function tests, echo, and appears  To be on optimal medication regiment. With this stated, will give sample of Breztri to see if Stephanie Irvin works better then [employer]. Further, will repeat CT chest to assess if HEAVENLY has returned. Ventilatory support consider (NIV, Bipap, Life 2000).   However, she dose not have chronic hypercapnia to justify these. Relevant Medications    Budeson-Glycopyrrol-Formoterol (BREZTRI AEROSPHERE) 160-9-4.8 MCG/ACT AERO    predniSONE (DELTASONE) 20 MG tablet    Chronic respiratory failure with hypoxia (HCC)     Zurdo Reed continues to need and benefit from supplemental oxygen. she is using oxygen continuously at 5-9 L NC. Other Visit Diagnoses     SOB (shortness of breath)    -  Primary    Relevant Medications    Budeson-Glycopyrrol-Formoterol (BREZTRI AEROSPHERE) 160-9-4.8 MCG/ACT AERO    predniSONE (DELTASONE) 20 MG tablet    Other Relevant Orders    CT CHEST WO CONTRAST    Lung collapse        Relevant Orders    CT CHEST WO CONTRAST                This note was transcribed using [id number] [employer]. Please disregard any translational errors.     Magno Norton [facility] [phone]

## 2021-08-26 NOTE — ASSESSMENT & PLAN NOTE
While her FEV1 is 54%, her DLCO is 19%. This is secondary to severe emphysema. No sign of pulmonary hypertension based on echo 2020. She is currently on Trelegy, Duoneb's , theophylline, and chronic prednisone at 15 mg daily with oxygen at 5-9L NC. She is having worsening dyspnea. We reviewed pulmonary function tests, echo, and appears  To be on optimal medication regiment. With this stated, will give sample of Breztri to see if [employer] works better then [employer]. Further, will repeat CT chest to assess if HEAVENLY has returned. Ventilatory support consider (NIV, Bipap, Life 2000). However, she dose not have chronic hypercapnia to justify these.

## 2021-08-26 NOTE — ASSESSMENT & PLAN NOTE
This was last addressed in 2016. With progression of disease, requiring 5-9 L NC, severe emphysema and age, she would not like chest compression or intubation.      DNR - CCA

## 2021-09-04 ENCOUNTER — HOSPITAL ENCOUNTER (OUTPATIENT)
Dept: CT IMAGING | Age: 81
Discharge: HOME | End: 2021-09-04
Payer: MEDICARE

## 2021-09-04 DIAGNOSIS — R06.02 SOB (SHORTNESS OF BREATH): ICD-10-CM

## 2021-09-04 DIAGNOSIS — J98.19 LUNG COLLAPSE: ICD-10-CM

## 2021-09-04 PROCEDURE — 71250 CT THORAX DX C-: CPT

## 2021-09-25 ENCOUNTER — NURSE TRIAGE (OUTPATIENT)
Dept: OTHER | Facility: CLINIC | Age: 81
End: 2021-09-25

## 2021-09-26 ENCOUNTER — APPOINTMENT (OUTPATIENT)
Dept: GENERAL RADIOLOGY | Age: 81
End: 2021-09-26
Payer: MEDICARE

## 2021-09-26 ENCOUNTER — HOSPITAL ENCOUNTER (EMERGENCY)
Age: 81
Discharge: HOME | End: 2021-09-26
Attending: STUDENT IN AN ORGANIZED HEALTH CARE EDUCATION/TRAINING PROGRAM
Payer: MEDICARE

## 2021-09-26 VITALS
BODY MASS INDEX: 23.03 KG/M2 | RESPIRATION RATE: 28 BRPM | DIASTOLIC BLOOD PRESSURE: 72 MMHG | OXYGEN SATURATION: 97 % | SYSTOLIC BLOOD PRESSURE: 161 MMHG | TEMPERATURE: 98.7 F | HEART RATE: 96 BPM | WEIGHT: 130 LBS

## 2021-09-26 DIAGNOSIS — J44.1 COPD EXACERBATION (HCC): Primary | ICD-10-CM

## 2021-09-26 DIAGNOSIS — R00.0 TACHYCARDIA: ICD-10-CM

## 2021-09-26 DIAGNOSIS — E86.0 DEHYDRATION: ICD-10-CM

## 2021-09-26 LAB
ANION GAP SERPL CALCULATED.3IONS-SCNC: 20 MMOL/L (ref 3–16)
BASE EXCESS VENOUS: -1 MMOL/L
BASOPHILS ABSOLUTE: 0.1 K/UL (ref 0–0.2)
BASOPHILS RELATIVE PERCENT: 0.6 %
BUN BLDV-MCNC: 14 MG/DL (ref 7–20)
CALCIUM SERPL-MCNC: 9.6 MG/DL (ref 8.3–10.6)
CARBOXYHEMOGLOBIN: 0.3 %
CHLORIDE BLD-SCNC: 102 MMOL/L (ref 99–110)
CO2: 20 MMOL/L (ref 21–32)
CREAT SERPL-MCNC: 0.7 MG/DL (ref 0.6–1.2)
EOSINOPHILS ABSOLUTE: 0 K/UL (ref 0–0.6)
EOSINOPHILS RELATIVE PERCENT: 0.1 %
GFR AFRICAN AMERICAN: >60
GFR NON-AFRICAN AMERICAN: >60
GLUCOSE BLD-MCNC: 224 MG/DL (ref 70–99)
HCO3 VENOUS: 25 MMOL/L (ref 23–29)
HCT VFR BLD CALC: 37.8 % (ref 36–48)
HEMOGLOBIN: 11.8 G/DL (ref 12–16)
LYMPHOCYTES ABSOLUTE: 1 K/UL (ref 1–5.1)
LYMPHOCYTES RELATIVE PERCENT: 7.6 %
MCH RBC QN AUTO: 24.9 PG (ref 26–34)
MCHC RBC AUTO-ENTMCNC: 31.1 G/DL (ref 31–36)
MCV RBC AUTO: 80.1 FL (ref 80–100)
METHEMOGLOBIN VENOUS: 0.6 %
MONOCYTES ABSOLUTE: 0.5 K/UL (ref 0–1.3)
MONOCYTES RELATIVE PERCENT: 4.1 %
NEUTROPHILS ABSOLUTE: 11.1 K/UL (ref 1.7–7.7)
NEUTROPHILS RELATIVE PERCENT: 87.6 %
O2 SAT, VEN: 46 %
O2 THERAPY: NORMAL
PCO2, VEN: 43.3 MMHG (ref 40–50)
PDW BLD-RTO: 20.6 % (ref 12.4–15.4)
PH VENOUS: 7.36 (ref 7.35–7.45)
PLATELET # BLD: 349 K/UL (ref 135–450)
PMV BLD AUTO: 9.2 FL (ref 5–10.5)
PO2, VEN: <30 MMHG
POTASSIUM REFLEX MAGNESIUM: 4.5 MMOL/L (ref 3.5–5.1)
PRO-BNP: 252 PG/ML (ref 0–449)
RBC # BLD: 4.71 M/UL (ref 4–5.2)
SODIUM BLD-SCNC: 142 MMOL/L (ref 136–145)
TCO2 CALC VENOUS: 26 MMOL/L
THEOPHYLLINE LEVEL: 11.5 UG/ML (ref 8–20)
TROPONIN: <0.01 NG/ML
WBC # BLD: 12.7 K/UL (ref 4–11)

## 2021-09-26 PROCEDURE — 96366 THER/PROPH/DIAG IV INF ADDON: CPT

## 2021-09-26 PROCEDURE — 82803 BLOOD GASES ANY COMBINATION: CPT

## 2021-09-26 PROCEDURE — 80048 BASIC METABOLIC PNL TOTAL CA: CPT

## 2021-09-26 PROCEDURE — 99283 EMERGENCY DEPT VISIT LOW MDM: CPT

## 2021-09-26 PROCEDURE — 6360000002 HC RX W HCPCS: Performed by: STUDENT IN AN ORGANIZED HEALTH CARE EDUCATION/TRAINING PROGRAM

## 2021-09-26 PROCEDURE — 84484 ASSAY OF TROPONIN QUANT: CPT

## 2021-09-26 PROCEDURE — 93005 ELECTROCARDIOGRAM TRACING: CPT | Performed by: STUDENT IN AN ORGANIZED HEALTH CARE EDUCATION/TRAINING PROGRAM

## 2021-09-26 PROCEDURE — 96375 TX/PRO/DX INJ NEW DRUG ADDON: CPT

## 2021-09-26 PROCEDURE — 71045 X-RAY EXAM CHEST 1 VIEW: CPT

## 2021-09-26 PROCEDURE — 80198 ASSAY OF THEOPHYLLINE: CPT

## 2021-09-26 PROCEDURE — 85025 COMPLETE CBC W/AUTO DIFF WBC: CPT

## 2021-09-26 PROCEDURE — 2580000003 HC RX 258: Performed by: STUDENT IN AN ORGANIZED HEALTH CARE EDUCATION/TRAINING PROGRAM

## 2021-09-26 PROCEDURE — 36415 COLL VENOUS BLD VENIPUNCTURE: CPT

## 2021-09-26 PROCEDURE — 83880 ASSAY OF NATRIURETIC PEPTIDE: CPT

## 2021-09-26 PROCEDURE — 96365 THER/PROPH/DIAG IV INF INIT: CPT

## 2021-09-26 RX ORDER — MAGNESIUM SULFATE IN WATER 40 MG/ML
2000 INJECTION, SOLUTION INTRAVENOUS ONCE
Status: COMPLETED | OUTPATIENT
Start: 2021-09-26 | End: 2021-09-26

## 2021-09-26 RX ORDER — SODIUM CHLORIDE, SODIUM LACTATE, POTASSIUM CHLORIDE, AND CALCIUM CHLORIDE .6; .31; .03; .02 G/100ML; G/100ML; G/100ML; G/100ML
1000 INJECTION, SOLUTION INTRAVENOUS ONCE
Status: COMPLETED | OUTPATIENT
Start: 2021-09-26 | End: 2021-09-26

## 2021-09-26 RX ORDER — METHYLPREDNISOLONE SODIUM SUCCINATE 125 MG/2ML
125 INJECTION, POWDER, LYOPHILIZED, FOR SOLUTION INTRAMUSCULAR; INTRAVENOUS ONCE
Status: COMPLETED | OUTPATIENT
Start: 2021-09-26 | End: 2021-09-26

## 2021-09-26 RX ORDER — PREDNISONE 50 MG/1
50 TABLET ORAL DAILY
Qty: 5 TABLET | Refills: 0 | Status: SHIPPED | OUTPATIENT
Start: 2021-09-26 | End: 2021-10-01

## 2021-09-26 RX ADMIN — SODIUM CHLORIDE, POTASSIUM CHLORIDE, SODIUM LACTATE AND CALCIUM CHLORIDE 1000 ML: 600; 310; 30; 20 INJECTION, SOLUTION INTRAVENOUS at 17:59

## 2021-09-26 RX ADMIN — MAGNESIUM SULFATE HEPTAHYDRATE 2000 MG: 40 INJECTION, SOLUTION INTRAVENOUS at 18:06

## 2021-09-26 RX ADMIN — METHYLPREDNISOLONE SODIUM SUCCINATE 125 MG: 125 INJECTION, POWDER, FOR SOLUTION INTRAMUSCULAR; INTRAVENOUS at 18:06

## 2021-09-26 NOTE — ED TRIAGE NOTES
C/o shortness of breath since Sat am and has gotten worse. Had used inhalers at home with no relief. Denies CP. Has had a non-productive cough. Denies fever. Has had nausea, denies vomiting. On home O2 9lit/min per n/c. Presented the ED with pursed lip breathing. Speaking 5-6 word sentences. Was given a duoneb per EMS with some relief.

## 2021-09-26 NOTE — ED NOTES
Bed: D-48  Expected date:   Expected time:   Means of arrival:   Comments:   Ingrid Bradshaw RN  09/26/21 9618

## 2021-09-27 LAB
EKG ATRIAL RATE: 110 BPM
EKG DIAGNOSIS: NORMAL
EKG P AXIS: 80 DEGREES
EKG P-R INTERVAL: 118 MS
EKG Q-T INTERVAL: 290 MS
EKG QRS DURATION: 82 MS
EKG QTC CALCULATION (BAZETT): 392 MS
EKG R AXIS: 63 DEGREES
EKG T AXIS: 105 DEGREES
EKG VENTRICULAR RATE: 110 BPM

## 2021-09-27 PROCEDURE — 93010 ELECTROCARDIOGRAM REPORT: CPT | Performed by: INTERNAL MEDICINE

## 2021-09-27 NOTE — ED PROVIDER NOTES
[facility]      Pt Name: Andrey Zapata  MRN: [id number]  [place] 1940  Date of evaluation: 9/26/2021  Provider: Vaishnavi Montana MD    CHIEF COMPLAINT       Chief Complaint   Patient presents with    Shortness of Breath     shortness of breath since Sat. Had used inhalers with no relief. HX COPD, on home O2 9liters/min     Heart racing, palpitations. HISTORY OF PRESENT ILLNESS   (Location/Symptom, Timing/Onset,Context/Setting, Quality, Duration, Modifying Factors, Severity)  Note limiting factors. Andrey Zapata is a 79 y.o. female who presents to the emergency department c/o heart racing and palpitations for the past 2 days. Denies chest pain or associated shortness of breath compared to her baseline. She states she is always SOB with exertion given her hx of COPD/emphysema on 9 L NC at baseline and she states that she is short of breath if she gets up and walks. Denies increase in sputum production, fevers, chills, chest pain, nausea, vomiting. States that she has been using her inhalers over the past few days. Feels slightly anxious after inhaler use. States that she feels like her heart is racing when she is walking, described this as a pounding sensation in her chest that makes her more anxious. Symptoms not otherwise alleviated or exacerbated by other factors. NursingNotes were reviewed. REVIEW OF SYSTEMS    (2-9 systems for level 4, 10 or more for level 5)       Constitutional: No fever or chills. Eye: No visual disturbances. No eye pain. Ear/Nose/Mouth/Throat: No nasal congestion. No sore throat. Respiratory: No cough, + shortness of breath, No sputum production. Cardiovascular: No chest pain. + palpitations. Gastrointestinal: No abdominal pain. No nausea or vomiting  Genitourinary: No dysuria. No hematuria. Hematology/Lymphatics: No bleeding or bruising tendency. Immunologic: No malaise.  No swollen glands. Musculoskeletal: No back pain. No joint pain. Integumentary: No rash. No abrasions. Neurologic: No headache. No focal numbness or weakness. PAST MEDICAL HISTORY     Past Medical History:   Diagnosis Date    COPD (chronic obstructive pulmonary disease) [address]     DNR (do not resuscitate) 2021    No intubation or Chest Compression    GERD (gastroesophageal reflux disease)     High cholesterol     Osteoporosis          SURGICALHISTORY       Past Surgical History:   Procedure Laterality Date     SECTION      4 times    COLONOSCOPY  . redundant colon         CURRENT MEDICATIONS       Previous Medications    ALBUTEROL (PROVENTIL) (2.5 MG/3ML) 0.083% NEBULIZER SOLUTION    Take 3 mLs by nebulization 2 times daily    ALBUTEROL SULFATE  (90 BASE) MCG/ACT INHALER    Inhale 2 puffs into the lungs every 4 hours as needed for Wheezing or Shortness of Breath    ASPIRIN 81 MG TABLET    Take 81 mg by mouth 3 times a week    ATORVASTATIN (LIPITOR) 20 MG TABLET    TAKE 1 TABLET EVERY DAY    BLOOD GLUCOSE MONITOR STRIPS    Test 1  time a day & as needed for symptoms of irregular blood glucose.     BUDESON-GLYCOPYRROL-FORMOTEROL (BREZTRI AEROSPHERE) 160-9-4.8 MCG/ACT AERO    Inhale 2 puffs into the lungs 2 times daily    CALCIUM CARB-CHOLECALCIFEROL 600-500 MG-UNIT CAPS    Take 1 tablet by mouth 2 times daily    CYANOCOBALAMIN (VITAMIN B 12 PO)    Take 1 tablet by mouth daily    FLUTICASONE-UMECLIDIN-VILANT (TRELEGY ELLIPTA) 200-62.5-25 MCG/INH AEPB    Inhale 1 puff into the lungs daily    GLUCOS-MSM-C-XF-QUTPYJ-MKWPWA (GLUCOSAMINE MSM COMPLEX PO)    Take 1 tablet by mouth 2 times daily    IPRATROPIUM-ALBUTEROL (DUONEB) 0.5-2.5 (3) MG/3ML SOLN NEBULIZER SOLUTION    Inhale 3 mLs into the lungs every 4 hours    LANCETS MISC    1 each by Does not apply route daily    LEVOTHYROXINE (SYNTHROID) 100 MCG TABLET    TAKE 1 TABLET EVERY DAY    METFORMIN (GLUCOPHAGE-XR) 500 MG EXTENDED RELEASE TABLET Expenses: Not hard at all   Food Insecurity: No Food Insecurity    Worried About [employer] [employer] in the Last Year: Never true    [place] [employer] in the Last Year: Never true   Transportation Needs:     Lack of Transportation (Medical):  Lack of Transportation (Non-Medical):    Physical Activity:     Days of Exercise per Week:     Minutes of Exercise per Session:    Stress:     Feeling of Stress :    Social Connections:     Frequency of Communication with Friends and Family:     Frequency of Social Gatherings with Friends and Family:     Attends [employer] Services:     Active Member of Clubs or Organizations:     Attends Club or Organization Meetings:     Marital Status:    Intimate Partner Violence:     Fear of Current or Ex-Partner:     Emotionally Abused:     Physically Abused:     Sexually Abused:        SCREENINGS             PHYSICAL EXAM    (up to 7 for level 4, 8 or more for level 5)     ED Triage Vitals [09/26/21 1708]   BP Temp Temp Source Pulse Resp SpO2 Height Weight   (!) 158/63 98.4 °F (36.9 °C) Oral 108 28 91 % -- 130 lb (59 kg)       General: Alert and oriented appropriately for age, chronically ill-appearing, no acute distress, mildly tachypneic. On 7 L nasal cannula. Eye: Normal conjunctiva. Pupils equal and reactive. HENT: Oral mucosa is moist.  Oropharynx within normal limits, patent. Respiratory: Respirations even and non-labored. Equal air entry to bilateral bases. No wheezing. No crackles, no rhonchi. Cardiovascular: Tachycardic, Regular rhythm. Intact peripheral pulses. No edema. No JVD. Gastrointestinal: Soft, Non-tender, Non-distended. Musculoskeletal: No swelling. Integumentary: Warm, Dry. Neurologic: Alert and appropriate for age. Oriented x4. GCS 15. No focal deficits. Psychiatric: Cooperative.     DIAGNOSTIC RESULTS     EKG: All EKG's are interpreted by the Emergency Department Physician who either signs or Co-signsthis chart in the absence of a cardiologist.    The Ekg interpreted by me shows  sinus tachycardia, xqik=702 bpm.   Axis is   Normal  QTc is  normal  Intervals and Durations are unremarkable. ST Segments: Nonspecific ST abnormalities, unchanged from prior EKG dated February 27, 2020  No significant change from prior EKG dated February 27, 2020 aside from rate. RADIOLOGY:   Non-plain filmimages such as CT, Ultrasound and MRI are read by the radiologist. Plain radiographic images are visualized and preliminarily interpreted by the emergency physician with the below findings:      Interpretation per the Radiologist below, if available at the time ofthis note:    XR CHEST PORTABLE   Final Result   Chronic emphysematous disease and cardiomegaly with no acute findings.                LABS:  Labs Reviewed   CBC WITH AUTO DIFFERENTIAL - Abnormal; Notable for the following components:       Result Value    WBC 12.7 (*)     Hemoglobin 11.8 (*)     MCH 24.9 (*)     RDW 20.6 (*)     Neutrophils Absolute 11.1 (*)     All other components within normal limits    Narrative:     Performed at:  [facility]  [address] [facility] [address]   Phone [phone]   BASIC METABOLIC PANEL W/ REFLEX TO MG FOR LOW K - Abnormal; Notable for the following components:    CO2 20 (*)     Anion Gap 20 (*)     Glucose 224 (*)     All other components within normal limits    Narrative:     Performed at:  [facility]  [address] [place], [place] [address]   Phone [phone]   TROPONIN    Narrative:     Performed at:  [place] [address] [place], [place] [address]   Phone [phone]   THEOPHYLLINE LEVEL    Narrative:     Performed at:  [facility]  [address] [place] [address]   Phone [phone]   BLOOD GAS, VENOUS    Narrative:     Performed at:  [facility]  [address] [facility] [place] [address]   Phone [phone]   BRAIN NATRIURETIC PEPTIDE    Narrative:     Performed at:  [facility]  [address] [facility] [place] [address]   Phone [phone]       All other labs were within normal range or not returned as of this dictation. EMERGENCY DEPARTMENT COURSE and DIFFERENTIAL DIAGNOSIS/MDM:   Vitals:    Vitals:    21 1708 21 1845 21 1911 21 1915   BP: (!) 158/63 139/76 (!) 155/76 (!) 149/69   Pulse: 108 98 78 93   Resp: 28 18 20 19   Temp: 98.4 °F (36.9 °C)  98.8 °F (37.1 °C)    TempSrc: Oral  Oral    SpO2: 91% 94% 95% 97%   Weight: 130 lb (59 kg)            Medical decision makin-year-old female with past medical history of COPD on theophylline, nebs, steroids chronically at home who presents with sensation of palpitations, describes heart racing, she is tachycardic on arrival, this is sinus tachycardia, she states that she has shortness of breath when she ambulates, otherwise feels at her usual baseline. Lungs sounds are slightly diminished all throughout, likely consistent with her COPD, there are no wheezing or rhonchi, she does have air entry to the bilateral bases. She is normoxic on her home level of O2, she actually has a decreased oxygen requirement from baseline. She denies sensation of shortness of breath while at rest.  Denies chest pain. She is likely volume depleted given decreased p.o. intake recently. She is given IV fluids to improvement, magnesium, given her steroids in case there is slight component of COPD exacerbation from her baseline. Labs reveal she has stable chronic leukocytosis, likely secondary to steroid use. Troponin is undetectable, theophylline level is therapeutic, BNP is unremarkable. Chest x-ray shows no acute change from prior, no consolidative pneumonia, no edema. She is not retaining CO2. She is currently feels at her baseline.   With shared decision-making discussion, offered admission and declined, arranged short interval outpatient follow-up with her PCP Dr. Keila Hines after talking with Dr. Sudhakar Beal. Given d/c instructions and return precautions, pt voices understanding. D/c home, departed the ED in stable condition. Medications   methylPREDNISolone sodium (SOLU-MEDROL) injection 125 mg (125 mg IntraVENous Given 9/26/21 1806)   lactated ringers bolus (1,000 mLs IntraVENous New Bag 9/26/21 1759)   magnesium sulfate 2000 mg in 50 mL IVPB premix (2,000 mg IntraVENous New Bag 9/26/21 1806)         CONSULTS:  IP CONSULT TO PRIMARY CARE PROVIDER      FINAL IMPRESSION      1. COPD exacerbation ([address]    2. Dehydration    3. Tachycardia          DISPOSITION/PLAN   DISPOSITION Discharge - Pending Orders Complete 09/26/2021 07:55:15 PM      PATIENT REFERRED TO:  Shira Londono, [address] [place] [address]  [phone]      Call for an appointment within the week.     [facility] Emergency Department  [address] [place] [address]  [phone]    If symptoms worsen      DISCHARGE MEDICATIONS:  New Prescriptions    PREDNISONE (DELTASONE) 50 MG TABLET    Take 1 tablet by mouth daily for 5 days          (Please note that portions of this note were completed with a voice recognition program.Efforts were made to edit the dictations but occasionally words are mis-transcribed.)    Ching Smith MD (electronically signed)  Attending Emergency Physician          Ching Smith MD  09/26/21 2045

## 2021-09-27 NOTE — ED NOTES
DC instr given to pt and family with RX for Prednisone. Son at bedside. Pt very dyspnic when she moves and O2 sat will decrease to 84%. Pt sts that is normal for her. Son states her breathing is normal also. After a few minutes, dyspnea subsides.  To car per w/c with O2.      PASCUAL GALEANO  09/26/21 [id number]

## 2021-09-29 ENCOUNTER — TELEPHONE (OUTPATIENT)
Dept: INTERNAL MEDICINE CLINIC | Age: 81
End: 2021-09-29

## 2021-09-29 NOTE — TELEPHONE ENCOUNTER
JONO--Pt called she went to ER on 9-26-21 because her heart was racing. She was dx with dehydration and and anxiety.

## 2021-10-06 ENCOUNTER — TELEPHONE (OUTPATIENT)
Dept: PULMONOLOGY | Age: 81
End: 2021-10-06

## 2021-10-06 DIAGNOSIS — J44.9 CHRONIC OBSTRUCTIVE PULMONARY DISEASE, UNSPECIFIED COPD TYPE (HCC): Primary | ICD-10-CM

## 2021-10-06 RX ORDER — AZITHROMYCIN 250 MG/1
250 TABLET, FILM COATED ORAL SEE ADMIN INSTRUCTIONS
Qty: 6 TABLET | Refills: 0 | Status: SHIPPED | OUTPATIENT
Start: 2021-10-06 | End: 2021-10-11

## 2021-10-06 RX ORDER — PREDNISONE 20 MG/1
20 TABLET ORAL DAILY
Qty: 5 TABLET | Refills: 0 | Status: SHIPPED | OUTPATIENT
Start: 2021-10-06 | End: 2021-10-11

## 2021-10-07 RX ORDER — SODIUM CHLORIDE FOR INHALATION 3 %
15 VIAL, NEBULIZER (ML) INHALATION PRN
Qty: 300 ML | Refills: 3 | Status: SHIPPED | OUTPATIENT
Start: 2021-10-07

## 2021-10-08 ENCOUNTER — TELEPHONE (OUTPATIENT)
Dept: INTERNAL MEDICINE CLINIC | Age: 81
End: 2021-10-08

## 2021-10-08 NOTE — TELEPHONE ENCOUNTER
----- Message from Kana Mike sent at 10/8/2021  2:51 PM EDT -----  Subject: Message to Provider    QUESTIONS  Information for Provider? Donald Christianson, wanted the practice to be made aware that   Jin Villalba has been taken to the hospital and has tested positive for Covid.     ---------------------------------------------------------------------------  --------------  CALL BACK INFO  What is the best way for the office to contact you? OK to leave message on   voicemail  Preferred Call Back Phone Number? [phone]  ---------------------------------------------------------------------------  --------------  SCRIPT ANSWERS  Relationship to Patient? Third Party  Representative Name?  Donald Christianson

## 2021-10-12 ENCOUNTER — TELEPHONE (OUTPATIENT)
Dept: INTERNAL MEDICINE CLINIC | Age: 81
End: 2021-10-12

## 2021-10-12 NOTE — TELEPHONE ENCOUNTER
----- Message from Addi Cherellecabrera sent at 10/12/2021  2:20 PM EDT -----  Subject: Message to Provider    QUESTIONS  Information for Provider? Pt is  , Pt's son Indu Reynolds called to   informed the office of her passing  ---------------------------------------------------------------------------  --------------  [address]  What is the best way for the office to contact you? OK to leave message on   voicemail  Preferred Call Back Phone Number? [phone]  ---------------------------------------------------------------------------  --------------  SCRIPT ANSWERS  Relationship to Patient? Other  Representative Name? Lorenzo Butts  Is the Representative on the appropriate HIPAA document in Epic?  Yes

## 2025-02-03 NOTE — TELEPHONE ENCOUNTER
Last Seen: 12/08/17
Pt called for two scripts to be sent to [address]. Spiriva Handihaler 18 mcg inhalation capsules, #90,   Advair Diskus 500/50 mcg/dose,  #3 inhalers, 1 inhallation orally bid    [facility] is her new pharmacy. PLease take [address] off of her chart.   Member ID [id number]
Scripts sent to [facility]
ok
Comment: Accession #[id number], ED&C 1/9/20.
Render Risk Assessment In Note?: no
Detail Level: Simple
Comment: Accession #[id number], Margins cleared 12/6/19.